# Patient Record
Sex: FEMALE | Race: WHITE | Employment: FULL TIME | URBAN - METROPOLITAN AREA
[De-identification: names, ages, dates, MRNs, and addresses within clinical notes are randomized per-mention and may not be internally consistent; named-entity substitution may affect disease eponyms.]

---

## 2017-12-07 ENCOUNTER — HOSPITAL ENCOUNTER (EMERGENCY)
Age: 52
Discharge: HOME | End: 2017-12-07
Attending: EMERGENCY MEDICINE
Payer: COMMERCIAL

## 2017-12-07 VITALS
DIASTOLIC BLOOD PRESSURE: 99 MMHG | OXYGEN SATURATION: 97 % | HEART RATE: 65 BPM | TEMPERATURE: 97.9 F | SYSTOLIC BLOOD PRESSURE: 149 MMHG | RESPIRATION RATE: 18 BRPM

## 2017-12-07 DIAGNOSIS — N35.010 POST-TRAUMATIC MALE URETHRAL MEATAL STRICTURE: ICD-10-CM

## 2017-12-07 DIAGNOSIS — R33.9 URINARY RETENTION: Primary | ICD-10-CM

## 2017-12-07 LAB
ANION GAP SERPL CALC-SCNC: 6 MMOL/L (ref 5–15)
APPEARANCE UR: CLEAR
BACTERIA URNS QL MICRO: NEGATIVE /HPF
BASOPHILS # BLD: 0 K/UL (ref 0–0.1)
BASOPHILS NFR BLD: 0 % (ref 0–1)
BILIRUB UR QL: NEGATIVE
BUN SERPL-MCNC: 12 MG/DL (ref 6–20)
BUN/CREAT SERPL: 16 (ref 12–20)
CALCIUM SERPL-MCNC: 8.7 MG/DL (ref 8.5–10.1)
CHLORIDE SERPL-SCNC: 105 MMOL/L (ref 97–108)
CO2 SERPL-SCNC: 27 MMOL/L (ref 21–32)
COLOR UR: ABNORMAL
CREAT SERPL-MCNC: 0.73 MG/DL (ref 0.55–1.02)
EOSINOPHIL # BLD: 0.1 K/UL (ref 0–0.4)
EOSINOPHIL NFR BLD: 1 % (ref 0–7)
EPITH CASTS URNS QL MICRO: ABNORMAL /LPF
ERYTHROCYTE [DISTWIDTH] IN BLOOD BY AUTOMATED COUNT: 12.4 % (ref 11.5–14.5)
GLUCOSE SERPL-MCNC: 199 MG/DL (ref 65–100)
GLUCOSE UR STRIP.AUTO-MCNC: 250 MG/DL
HCT VFR BLD AUTO: 38.2 % (ref 35–47)
HGB BLD-MCNC: 12.6 G/DL (ref 11.5–16)
HGB UR QL STRIP: ABNORMAL
HYALINE CASTS URNS QL MICRO: ABNORMAL /LPF (ref 0–5)
KETONES UR QL STRIP.AUTO: NEGATIVE MG/DL
LEUKOCYTE ESTERASE UR QL STRIP.AUTO: NEGATIVE
LYMPHOCYTES # BLD: 1.7 K/UL (ref 0.8–3.5)
LYMPHOCYTES NFR BLD: 24 % (ref 12–49)
MCH RBC QN AUTO: 30.7 PG (ref 26–34)
MCHC RBC AUTO-ENTMCNC: 33 G/DL (ref 30–36.5)
MCV RBC AUTO: 92.9 FL (ref 80–99)
MONOCYTES # BLD: 0.4 K/UL (ref 0–1)
MONOCYTES NFR BLD: 5 % (ref 5–13)
NEUTS SEG # BLD: 4.9 K/UL (ref 1.8–8)
NEUTS SEG NFR BLD: 70 % (ref 32–75)
NITRITE UR QL STRIP.AUTO: NEGATIVE
PH UR STRIP: 5.5 [PH] (ref 5–8)
PLATELET # BLD AUTO: 209 K/UL (ref 150–400)
POTASSIUM SERPL-SCNC: 4.3 MMOL/L (ref 3.5–5.1)
PROT UR STRIP-MCNC: NEGATIVE MG/DL
RBC # BLD AUTO: 4.11 M/UL (ref 3.8–5.2)
RBC #/AREA URNS HPF: ABNORMAL /HPF (ref 0–5)
SODIUM SERPL-SCNC: 138 MMOL/L (ref 136–145)
SP GR UR REFRACTOMETRY: 1.02 (ref 1–1.03)
UA: UC IF INDICATED,UAUC: ABNORMAL
UROBILINOGEN UR QL STRIP.AUTO: 0.2 EU/DL (ref 0.2–1)
WBC # BLD AUTO: 7 K/UL (ref 3.6–11)
WBC URNS QL MICRO: ABNORMAL /HPF (ref 0–4)

## 2017-12-07 PROCEDURE — 85025 COMPLETE CBC W/AUTO DIFF WBC: CPT | Performed by: EMERGENCY MEDICINE

## 2017-12-07 PROCEDURE — 99284 EMERGENCY DEPT VISIT MOD MDM: CPT

## 2017-12-07 PROCEDURE — 81001 URINALYSIS AUTO W/SCOPE: CPT | Performed by: EMERGENCY MEDICINE

## 2017-12-07 PROCEDURE — 80048 BASIC METABOLIC PNL TOTAL CA: CPT | Performed by: EMERGENCY MEDICINE

## 2017-12-07 PROCEDURE — 51703 INSERT BLADDER CATH COMPLEX: CPT

## 2017-12-07 PROCEDURE — 74011000250 HC RX REV CODE- 250: Performed by: EMERGENCY MEDICINE

## 2017-12-07 PROCEDURE — 77030005514 HC CATH URETH FOL14 BARD -A

## 2017-12-07 PROCEDURE — 36415 COLL VENOUS BLD VENIPUNCTURE: CPT | Performed by: EMERGENCY MEDICINE

## 2017-12-07 RX ORDER — LIDOCAINE HYDROCHLORIDE 20 MG/ML
JELLY TOPICAL
Status: COMPLETED | OUTPATIENT
Start: 2017-12-07 | End: 2017-12-07

## 2017-12-07 RX ORDER — CIPROFLOXACIN 250 MG/1
250 TABLET, FILM COATED ORAL EVERY 12 HOURS
Qty: 6 TAB | Refills: 0 | OUTPATIENT
Start: 2017-12-07 | End: 2017-12-10

## 2017-12-07 RX ADMIN — LIDOCAINE HYDROCHLORIDE: 20 JELLY TOPICAL at 14:39

## 2017-12-07 NOTE — ED NOTES
Attempted to straight cath patient and was unable to at this time. Dr. Dania Gomez was aware. Dr. Dania Gomez assisted the second time and was unsuccessful.

## 2017-12-07 NOTE — ED NOTES
Pt discharged by Dr Kristal Ace. Pt provided with discharge instructions Rx and instructions on follow up care. Pt out of ED under own power with steady gait accompanied by family.

## 2017-12-07 NOTE — ED PROVIDER NOTES
EMERGENCY DEPARTMENT HISTORY AND PHYSICAL EXAM      Date: 12/7/2017  Patient Name: Makayla Ruvalcaba    History of Presenting Illness     Chief Complaint   Patient presents with    Abdominal Pain    Urinary Retention     Last urinated at 0630 this morning. Pt states decreased over the past few days. History Provided By: Patient    HPI: Makayla Ruvalcaba, 46 y.o. female with PMHx significant for HTN and DM, presents ambulatory to the ED with cc of urinary retention, minimal dysuria, and associated lower abdominal pain x 2 days. She describes her abdominal pain as a pressure. She notes that she last urinated at 0630 this morning, but she had decreased urine output. She also notes a history of vesicoureteral reflux and recurrent UTIs. She reports that she had urological surgery in 2006. She specifically denies back pain, flank pain, weakness, saddle anesthesia, urinary/fecal incontinence, numbness, fevers, chills, chest pain, SOB, or other complaints at this time. There are no other complaints, changes, or physical findings at this time. PCP: Javier Ortiz MD   Urology: Madison Thomason M.D. Current Outpatient Prescriptions   Medication Sig Dispense Refill    ciprofloxacin HCl (CIPRO) 250 mg tablet Take 1 Tab by mouth every twelve (12) hours for 3 days. 6 Tab 0    propranolol (INDERAL) 40 mg tablet Take 40 mg by mouth two (2) times a day.  phentermine 37.5 mg capsule Take 37.5 mg by mouth every morning.  oxyCODONE-acetaminophen (PERCOCET) 5-325 mg per tablet Take 1 Tab by mouth every four (4) hours as needed for Pain. Max Daily Amount: 6 Tabs. 20 Tab 0       Past History     Past Medical History:  Past Medical History:   Diagnosis Date    Diabetes ([address]     Hypertension        Past Surgical History:  Past Surgical History:   Procedure Laterality Date    HX UROLOGICAL      kidney 2006       Family History:  History reviewed. No pertinent family history.     Social History:  Social History   Substance Use Topics    Smoking status: Former Smoker    Smokeless tobacco: Never Used    Alcohol use Yes       Allergies: Allergies   Allergen Reactions    Keflex [Cephalexin] Rash    Pcn [Penicillins] Swelling         Review of Systems   Review of Systems   Constitutional: Negative for chills and fever. HENT: Negative for congestion and sore throat. Eyes: Negative for visual disturbance. Respiratory: Negative for cough and shortness of breath. Cardiovascular: Negative for chest pain and leg swelling. Gastrointestinal: Positive for abdominal pain. Negative for blood in stool, diarrhea and nausea. Endocrine: Negative for polyuria. Genitourinary: Positive for decreased urine volume, difficulty urinating and dysuria. Negative for flank pain, vaginal bleeding and vaginal discharge. (-) Urinary/fecal incontinence   Musculoskeletal: Negative for back pain and myalgias. Skin: Negative for rash. Allergic/Immunologic: Negative for immunocompromised state. Neurological: Negative for weakness, numbness (Saddle anesthesia) and headaches. Psychiatric/Behavioral: Negative for confusion. Physical Exam   Physical Exam   Constitutional: She is oriented to person, place, and time. She appears well-developed and well-nourished. HENT:   Head: Normocephalic and atraumatic. Moist mucous membranes   Eyes: Conjunctivae are normal. Pupils are equal, round, and reactive to light. Right eye exhibits no discharge. Left eye exhibits no discharge. Neck: Normal range of motion. Neck supple. No tracheal deviation present. Cardiovascular: Normal rate, regular rhythm and normal heart sounds. No murmur heard. Pulmonary/Chest: Effort normal and breath sounds normal. No respiratory distress. She has no wheezes. She has no rales. Abdominal: Soft. Bowel sounds are normal. There is tenderness in the suprapubic area. There is CVA tenderness (Slight left).  There is no rebound and no guarding. Musculoskeletal: Normal range of motion. She exhibits no edema, tenderness or deformity. Neurological: She is alert and oriented to person, place, and time. Skin: Skin is warm and dry. No rash noted. No erythema. Psychiatric: Her behavior is normal.   Nursing note and vitals reviewed. Diagnostic Study Results     Labs -     Recent Results (from the past 12 hour(s))   METABOLIC PANEL, BASIC    Collection Time: 12/07/17  1:55 PM   Result Value Ref Range    Sodium 138 136 - 145 mmol/L    Potassium 4.3 3.5 - 5.1 mmol/L    Chloride 105 97 - 108 mmol/L    CO2 27 21 - 32 mmol/L    Anion gap 6 5 - 15 mmol/L    Glucose 199 (H) 65 - 100 mg/dL    BUN 12 6 - 20 MG/DL    Creatinine 0.73 0.55 - 1.02 MG/DL    BUN/Creatinine ratio 16 12 - 20      GFR est AA >60 >60 ml/min/1.73m2    GFR est non-AA >60 >60 ml/min/1.73m2    Calcium 8.7 8.5 - 10.1 MG/DL   CBC WITH AUTOMATED DIFF    Collection Time: 12/07/17  1:55 PM   Result Value Ref Range    WBC 7.0 3.6 - 11.0 K/uL    RBC 4.11 3.80 - 5.20 M/uL    HGB 12.6 11.5 - 16.0 g/dL    HCT 38.2 35.0 - 47.0 %    MCV 92.9 80.0 - 99.0 FL    MCH 30.7 26.0 - 34.0 PG    MCHC 33.0 30.0 - 36.5 g/dL    RDW 12.4 11.5 - 14.5 %    PLATELET 677 931 - 949 K/uL    NEUTROPHILS 70 32 - 75 %    LYMPHOCYTES 24 12 - 49 %    MONOCYTES 5 5 - 13 %    EOSINOPHILS 1 0 - 7 %    BASOPHILS 0 0 - 1 %    ABS. NEUTROPHILS 4.9 1.8 - 8.0 K/UL    ABS. LYMPHOCYTES 1.7 0.8 - 3.5 K/UL    ABS. MONOCYTES 0.4 0.0 - 1.0 K/UL    ABS. EOSINOPHILS 0.1 0.0 - 0.4 K/UL    ABS.  BASOPHILS 0.0 0.0 - 0.1 K/UL   URINALYSIS W/ REFLEX CULTURE    Collection Time: 12/07/17  4:18 PM   Result Value Ref Range    Color YELLOW/STRAW      Appearance CLEAR CLEAR      Specific gravity 1.017 1.003 - 1.030      pH (UA) 5.5 5.0 - 8.0      Protein NEGATIVE  NEG mg/dL    Glucose 250 (A) NEG mg/dL    Ketone NEGATIVE  NEG mg/dL    Bilirubin NEGATIVE  NEG      Blood MODERATE (A) NEG      Urobilinogen 0.2 0.2 - 1.0 EU/dL    Nitrites NEGATIVE NEG      Leukocyte Esterase NEGATIVE  NEG      WBC 0-4 0 - 4 /hpf    RBC 20-50 0 - 5 /hpf    Epithelial cells FEW FEW /lpf    Bacteria NEGATIVE  NEG /hpf    UA:UC IF INDICATED CULTURE NOT INDICATED BY UA RESULT CNI      Hyaline cast 0-2 0 - 5 /lpf       Medical Decision Making   I am the first provider for this patient. I reviewed the vital signs, available nursing notes, past medical history, past surgical history, family history and social history. Vital Signs-Reviewed the patient's vital signs. Patient Vitals for the past 12 hrs:   Temp Pulse Resp BP SpO2   12/07/17 1545 - - - (!) 149/99 97 %   12/07/17 1500 - - - (!) 138/96 97 %   12/07/17 1330 - - - 136/77 97 %   12/07/17 1318 97.9 °F (36.6 °C) 65 18 (!) 182/93 99 %       Records Reviewed: Nursing Notes, Old Medical Records and Previous Laboratory Studies    Provider Notes (Medical Decision Making):   DDx: UTI. STI is less likely. Urinary retention secondary to neurologic process is unlikely as she is has no back pain. Kidney/uretal stone is possible, but the patient is not have significant symptoms to support at this time. ED Course:   Initial assessment performed. The patients presenting problems have been discussed, and they are in agreement with the care plan formulated and outlined with them. I have encouraged them to ask questions as they arise throughout their visit. Procedure Note - Limited Bedside Ultrasound - Bladder   1:36 PM  Performed by: Candice Thomas DO  Indication: urinary retention  Interpretation: abnormal  Bladder volume was not measured, but ultrasound does show the bladder retaining urine. The procedure took 1-15 minutes, and pt tolerated well. Written by CARLINE Lamas, as dictated by Candice Thomas DO. Progress Note:  2:37 PM  The patient is unable to urinate at this time. Will straight cath for urine and give muscarinic agonist for urinary retention.   Written by CARLINE Lamas, as dictated by Irma Adams DO Gill. Consult Note:  2:29 PM  Brenden Carrasquillo DO spoke with Jose Washington MD,  Specialty: Urology  Discussed pt's hx, disposition, and available diagnostic and imaging results. Reviewed care plans. Consultant agrees with plans as outlined. Dr. Ava Vora reports that it is likely a cystocele among other things. He recommends rod catheter placement, possible a coude catheter. Dr. vAa Vora reviewed various techniques over the phone, and recommends to re-call if catheter placement is unsuccessful. Progress Note:  3:15 PM  Attempted to place rod catheter without success. Will re-page Urology. Written by Susana Lowery ED Scribe, as dictated by Brenden Carrasquillo DO. Progress Note:  4:06 PM  Urology called back and states they will place the rod catheter. Written by CARLINE Cuba, as dictated by Brenden Carrasquillo DO. Critical Care Time: 0 minutes    Discharge Note:  5:10 PM  The pt is ready for discharge. The pt's signs, symptoms, diagnosis, and discharge instructions have been discussed and pt has conveyed their understanding. The pt is to follow up as recommended or return to ER should their symptoms worsen. Plan has been discussed and pt is in agreement. PLAN:  1. Discharge Medication List as of 12/7/2017  4:45 PM      START taking these medications    Details   ciprofloxacin HCl (CIPRO) 250 mg tablet Take 1 Tab by mouth every twelve (12) hours for 3 days. , Print, Disp-6 Tab, R-0         CONTINUE these medications which have NOT CHANGED    Details   propranolol (INDERAL) 40 mg tablet Take 40 mg by mouth two (2) times a day., Historical Med      phentermine 37.5 mg capsule Take 37.5 mg by mouth every morning., Historical Med      oxyCODONE-acetaminophen (PERCOCET) 5-325 mg per tablet Take 1 Tab by mouth every four (4) hours as needed for Pain. Max Daily Amount: 6 Tabs., Print, Disp-20 Tab, R-0           2.    Follow-up Information     Follow up With [facility]  As directed [address] [facility]          Return to ED if worse     Diagnosis     Clinical Impression:   1. Urinary retention            Attestations: This note is prepared by Lc Espinoza, acting as a Scribe for Brenden Alonso DO. Brenden Alonso DO: The scribe's documentation has been prepared under my direction and personally reviewed by me in its entirety. I confirm that the notes above accurately reflects all work, treatment, procedures, and medical decision making performed by me. This note will not be viewable in [address]

## 2017-12-07 NOTE — ED NOTES
Assumed care of pt. Pt. Placed in position of comfort. Call bell within reach. Pt. Made aware of care plan. Pt came in with c/o not being able to urinate. Pt has hx of kidney stones.

## 2017-12-07 NOTE — DISCHARGE INSTRUCTIONS

## 2017-12-07 NOTE — CONSULTS
Requesting Provider: [employer], DO  Reason for Consultation: \"retention\"  Pre-existing [facility] Patient:   Carisa                Patient: Betsy Nielsen MRN: [id number]  SSN: [id number]    YOB: 1965  Age: 46 y.o. Sex: female     Location: [facility]/       Code Status: No Order   PCP: Ace Frederick, MD  - None   Emergency Contact:  Primary Emergency Contact: Luis AlfredoMalcolm, Home Phone: [phone]   Race/Mormonism/Language: [facility] /  / [facility] [place]: Payor: Saúl Dipesh / Plan: [facility] / Product Type: Commerical /    Prior Admission Data: 16 [facility] EMERGENCY DEPT     Hospitalized:  Hospital Day: 1 - Admitted 2017  1:15 PM   POD # * No surgery found *  by * Surgery not found * - Blood Loss: * No surgery found * * Surgery not found *     CONSULTANTS  None   ADMISSION DIAGNOSES  No diagnosis found. Assessment/Plan:     · Urethral stricture[place] 3 discharge. Empiric coverage a few days antibiotics. We will call to arrange formal cystoscopy and urethral dilation. Possible decompression hematuria noted. CC: Abdominal Pain and Urinary Retention (Last urinated at 0630 this morning. Pt states decreased over the past few days.)   HPI: She has past medical history urethral stenosis, kidney stones, vesicoureteral reflux. She has noticed increasing difficulty with urination culminating in inability to void. Bladder scan shows 800 cc. ER personnel have been unable to place catheter. Blood seen on the drapes upon arrival.    Sterile prep and drape accomplished 16 daily placed. There was considerable resistance within the urethra, I popped through probably indicating recurrent urethral stricture. Clear urine drained. The balloon was blown up.     Temp (24hrs), Av.9 °F (36.6 °C), Min:97.9 °F (36.6 °C), Max:97.9 °F (36.6 °C)    Creatinine   Date/Time Value Ref Range Status   2017 01:55 PM 0.73 0.55 - 1.02 MG/DL Final   2016 09:20 AM 0.83 0.55 - 1.02 MG/DL Final     Current Antimicrobial Therapy     None        Diet:   -          Labs     Lab Results   Component Value Date/Time    WBC 7.0 12/07/2017 01:55 PM    HCT 38.2 12/07/2017 01:55 PM    PLATELET 865 51/81/9834 01:55 PM    Sodium 138 12/07/2017 01:55 PM    Potassium 4.3 12/07/2017 01:55 PM    Chloride 105 12/07/2017 01:55 PM    CO2 27 12/07/2017 01:55 PM    BUN 12 12/07/2017 01:55 PM    Creatinine 0.73 12/07/2017 01:55 PM    Glucose 199 12/07/2017 01:55 PM    Calcium 8.7 12/07/2017 01:55 PM     UA: Lab Results   Component Value Date/Time    Color YELLOW/STRAW 08/23/2016 08:14 AM    Appearance TURBID 08/23/2016 08:14 AM    Specific gravity 1.013 08/23/2016 08:14 AM    pH (UA) 5.5 08/23/2016 08:14 AM    Protein TRACE 08/23/2016 08:14 AM    Glucose NEGATIVE  08/23/2016 08:14 AM    Ketone NEGATIVE  08/23/2016 08:14 AM    Bilirubin NEGATIVE  08/23/2016 08:14 AM    Urobilinogen 0.2 08/23/2016 08:14 AM    Nitrites NEGATIVE  08/23/2016 08:14 AM    Leukocyte Esterase LARGE 08/23/2016 08:14 AM    Epithelial cells FEW 08/23/2016 08:14 AM    Bacteria 1+ 08/23/2016 08:14 AM    WBC >100 08/23/2016 08:14 AM    RBC 5-10 08/23/2016 08:14 AM     Imaging     Results for orders placed during the hospital encounter of 08/23/16   CT ABD PELV WO CONT    Narrative EXAM:  CT ABD PELV WO CONT  Clinical history: Abdominal pain and kidney stone  INDICATION:  Abd Pain/KS    COMPARISON: None. CONTRAST:  None. TECHNIQUE:   Unenhanced multislice helical CT was performed from the diaphragm to the  symphysis pubis without oral or intravenous contrast administration. Contiguous  5 mm axial images were reconstructed and lung and soft tissue windows were  generated. Coronal and sagittal reformations were generated. CT dose reduction  was achieved through use of a standardized protocol tailored for this  examination and automatic exposure control for dose modulation.  Adaptive  statistical iterative reconstruction (ASIR) was utilized. FINDINGS:  There is moderate to severe hydroureteronephrosis on the left with multiple  small distal left ureteral calculi identified. Hyper density of the urothelium  suggests possible superimposed pyelonephritis. There are no right renal calculi. Hepatic steatosis. The absence of intravenous contrast material reduces the sensitivity for  evaluation of the solid parenchymal organs of the abdomen. The aorta tapers without aneurysm. There is no retroperitoneal adenopathy or  mass. The bowel is not obstructed. There is no pelvic mass or adenopathy. Degenerative change in the lumbar spine with severe canal and foraminal stenosis  at L5-S1. Impression IMPRESSION:    There is moderate to severe hydroureteronephrosis on the left with multiple  small distal left ureteral calculi identified. Up to 6 mm in size. Hyperdensity  of the urothelium suggests possible superimposed pyelonephritis. There are no  right renal calculi. Hepatic steatosis. Degenerative change in the lumbar spine with severe canal and foraminal stenoses  at L5-S1. US Results (most recent):  No results found for this or any previous visit. Cultures     All Micro Results     None           Past History: (Complete 2+/3 areas)     Allergies   Allergen Reactions    Keflex [Cephalexin] Rash    Pcn [Penicillins] Swelling      No current facility-administered medications for this encounter. Current Outpatient Prescriptions   Medication Sig    propranolol (INDERAL) 40 mg tablet Take 40 mg by mouth two (2) times a day.  phentermine 37.5 mg capsule Take 37.5 mg by mouth every morning.  oxyCODONE-acetaminophen (PERCOCET) 5-325 mg per tablet Take 1 Tab by mouth every four (4) hours as needed for Pain. Max Daily Amount: 6 Tabs. Prior to Admission medications    Medication Sig Start Date End Date Taking?  Authorizing Provider   propranolol (INDERAL) 40 mg tablet Take 40 mg by mouth two (2) times a day. Yes Ace Frederick MD   phentermine 37.5 mg capsule Take 37.5 mg by mouth every morning. Ace Frederick MD   oxyCODONE-acetaminophen (PERCOCET) 5-325 mg per tablet Take 1 Tab by mouth every four (4) hours as needed for Pain. Max Daily Amount: 6 Tabs. 8/23/16   NICO Thornton        PMHx:  has a past medical history of Diabetes ([address] and Hypertension. PSurgHx:  has a past surgical history that includes urological.  PSocHx:  reports that she has quit smoking. She has never used smokeless tobacco. She reports that she drinks alcohol. She reports that she does not use illicit drugs.    ROS:  (Complete - 10 systems) - positives are bolded : Weightloss (Constitutional), Dry mouth (ENMT), Chest pain (CV), SOB (Respiratory), Constipation (GI), Weakness (MS), Pallor (Skin), TIA Sx (Neuro), Confusion (Psych), Easy bruising (Heme)    Physical Exam: (Comprehesive - 8+ 1995 Systems)     (1) Constitutional:  FIO2:   on SpO2: O2 Sat (%): 97 %       Patient Vitals for the past 24 hrs:   BP Temp Pulse Resp SpO2   12/07/17 1545 (!) 149/99 - - - 97 %   12/07/17 1500 (!) 138/96 - - - 97 %   12/07/17 1330 136/77 - - - 97 %   12/07/17 1318 (!) 182/93 97.9 °F (36.6 °C) 65 18 99 %           (2) ENMT:   moist mucous membranes   (3) Respiratory:  breathing easily   (4) GI:  no abdominal masses, tenderness, no hepatosplenomegaly, no ventral hernia, stool for occult blood not indicated as urologist.   (5) :   normal   (6) Lymphatic:  no adenopathy   (7) Muscloskeletal:  no gross deformity   (8) Skin:  no rash   (9) Neuro:  no focal deficits      Signed By: Emiliana Montesinos MD  - December 7, 2017

## 2018-06-10 ENCOUNTER — HOSPITAL ENCOUNTER (EMERGENCY)
Age: 53
Discharge: HOME | End: 2018-06-10
Attending: EMERGENCY MEDICINE
Payer: COMMERCIAL

## 2018-06-10 ENCOUNTER — APPOINTMENT (OUTPATIENT)
Dept: CT IMAGING | Age: 53
End: 2018-06-10
Attending: EMERGENCY MEDICINE
Payer: COMMERCIAL

## 2018-06-10 VITALS
RESPIRATION RATE: 18 BRPM | HEIGHT: 67 IN | SYSTOLIC BLOOD PRESSURE: 128 MMHG | BODY MASS INDEX: 29.93 KG/M2 | HEART RATE: 62 BPM | WEIGHT: 190.7 LBS | OXYGEN SATURATION: 97 % | TEMPERATURE: 98 F | DIASTOLIC BLOOD PRESSURE: 70 MMHG

## 2018-06-10 DIAGNOSIS — N30.00 ACUTE CYSTITIS WITHOUT HEMATURIA: Primary | ICD-10-CM

## 2018-06-10 LAB
ALBUMIN SERPL-MCNC: 3.8 G/DL (ref 3.5–5)
ALBUMIN/GLOB SERPL: 0.9 {RATIO} (ref 1.1–2.2)
ALP SERPL-CCNC: 168 U/L (ref 45–117)
ALT SERPL-CCNC: 76 U/L (ref 12–78)
ANION GAP SERPL CALC-SCNC: 7 MMOL/L (ref 5–15)
APPEARANCE UR: CLEAR
AST SERPL-CCNC: 72 U/L (ref 15–37)
BACTERIA URNS QL MICRO: ABNORMAL /HPF
BASOPHILS # BLD: 0 K/UL (ref 0–0.1)
BASOPHILS NFR BLD: 0 % (ref 0–1)
BILIRUB SERPL-MCNC: 0.7 MG/DL (ref 0.2–1)
BILIRUB UR QL: NEGATIVE
BUN SERPL-MCNC: 13 MG/DL (ref 6–20)
BUN/CREAT SERPL: 16 (ref 12–20)
CALCIUM SERPL-MCNC: 9.5 MG/DL (ref 8.5–10.1)
CHLORIDE SERPL-SCNC: 103 MMOL/L (ref 97–108)
CO2 SERPL-SCNC: 28 MMOL/L (ref 21–32)
COLOR UR: ABNORMAL
CREAT SERPL-MCNC: 0.83 MG/DL (ref 0.55–1.02)
DIFFERENTIAL METHOD BLD: NORMAL
EOSINOPHIL # BLD: 0.1 K/UL (ref 0–0.4)
EOSINOPHIL NFR BLD: 2 % (ref 0–7)
EPITH CASTS URNS QL MICRO: ABNORMAL /LPF
ERYTHROCYTE [DISTWIDTH] IN BLOOD BY AUTOMATED COUNT: 12.4 % (ref 11.5–14.5)
GLOBULIN SER CALC-MCNC: 4.1 G/DL (ref 2–4)
GLUCOSE SERPL-MCNC: 281 MG/DL (ref 65–100)
GLUCOSE UR STRIP.AUTO-MCNC: >1000 MG/DL
HCT VFR BLD AUTO: 40.9 % (ref 35–47)
HGB BLD-MCNC: 13.4 G/DL (ref 11.5–16)
HGB UR QL STRIP: NEGATIVE
IMM GRANULOCYTES # BLD: 0 K/UL (ref 0–0.04)
IMM GRANULOCYTES NFR BLD AUTO: 0 % (ref 0–0.5)
KETONES UR QL STRIP.AUTO: NEGATIVE MG/DL
LEUKOCYTE ESTERASE UR QL STRIP.AUTO: NEGATIVE
LIPASE SERPL-CCNC: 135 U/L (ref 73–393)
LYMPHOCYTES # BLD: 1.8 K/UL (ref 0.8–3.5)
LYMPHOCYTES NFR BLD: 24 % (ref 12–49)
MCH RBC QN AUTO: 30.5 PG (ref 26–34)
MCHC RBC AUTO-ENTMCNC: 32.8 G/DL (ref 30–36.5)
MCV RBC AUTO: 93.2 FL (ref 80–99)
MONOCYTES # BLD: 0.4 K/UL (ref 0–1)
MONOCYTES NFR BLD: 6 % (ref 5–13)
NEUTS SEG # BLD: 5.1 K/UL (ref 1.8–8)
NEUTS SEG NFR BLD: 67 % (ref 32–75)
NITRITE UR QL STRIP.AUTO: POSITIVE
NRBC # BLD: 0 K/UL (ref 0–0.01)
NRBC BLD-RTO: 0 PER 100 WBC
PH UR STRIP: 5.5 [PH] (ref 5–8)
PLATELET # BLD AUTO: 242 K/UL (ref 150–400)
PMV BLD AUTO: 10.6 FL (ref 8.9–12.9)
POTASSIUM SERPL-SCNC: 4.3 MMOL/L (ref 3.5–5.1)
PROT SERPL-MCNC: 7.9 G/DL (ref 6.4–8.2)
PROT UR STRIP-MCNC: NEGATIVE MG/DL
RBC # BLD AUTO: 4.39 M/UL (ref 3.8–5.2)
RBC #/AREA URNS HPF: ABNORMAL /HPF (ref 0–5)
SODIUM SERPL-SCNC: 138 MMOL/L (ref 136–145)
SP GR UR REFRACTOMETRY: 1.02 (ref 1–1.03)
UA: UC IF INDICATED,UAUC: ABNORMAL
UROBILINOGEN UR QL STRIP.AUTO: 0.2 EU/DL (ref 0.2–1)
WBC # BLD AUTO: 7.5 K/UL (ref 3.6–11)
WBC URNS QL MICRO: ABNORMAL /HPF (ref 0–4)

## 2018-06-10 PROCEDURE — 99285 EMERGENCY DEPT VISIT HI MDM: CPT

## 2018-06-10 PROCEDURE — 96374 THER/PROPH/DIAG INJ IV PUSH: CPT

## 2018-06-10 PROCEDURE — 87086 URINE CULTURE/COLONY COUNT: CPT | Performed by: EMERGENCY MEDICINE

## 2018-06-10 PROCEDURE — 51798 US URINE CAPACITY MEASURE: CPT

## 2018-06-10 PROCEDURE — 81001 URINALYSIS AUTO W/SCOPE: CPT | Performed by: EMERGENCY MEDICINE

## 2018-06-10 PROCEDURE — 80053 COMPREHEN METABOLIC PANEL: CPT | Performed by: EMERGENCY MEDICINE

## 2018-06-10 PROCEDURE — 83690 ASSAY OF LIPASE: CPT | Performed by: EMERGENCY MEDICINE

## 2018-06-10 PROCEDURE — 85025 COMPLETE CBC W/AUTO DIFF WBC: CPT | Performed by: EMERGENCY MEDICINE

## 2018-06-10 PROCEDURE — 74011250637 HC RX REV CODE- 250/637: Performed by: EMERGENCY MEDICINE

## 2018-06-10 PROCEDURE — 74176 CT ABD & PELVIS W/O CONTRAST: CPT

## 2018-06-10 PROCEDURE — 74011250636 HC RX REV CODE- 250/636: Performed by: EMERGENCY MEDICINE

## 2018-06-10 RX ORDER — CIPROFLOXACIN 500 MG/1
500 TABLET ORAL 2 TIMES DAILY
Qty: 20 TAB | Refills: 0 | Status: SHIPPED | OUTPATIENT
Start: 2018-06-10 | End: 2018-06-20

## 2018-06-10 RX ORDER — ONDANSETRON 2 MG/ML
4 INJECTION INTRAMUSCULAR; INTRAVENOUS
Status: COMPLETED | OUTPATIENT
Start: 2018-06-10 | End: 2018-06-10

## 2018-06-10 RX ORDER — SODIUM CHLORIDE 0.9 % (FLUSH) 0.9 %
5-10 SYRINGE (ML) INJECTION AS NEEDED
Status: DISCONTINUED | OUTPATIENT
Start: 2018-06-10 | End: 2018-06-10 | Stop reason: HOSPADM

## 2018-06-10 RX ORDER — CIPROFLOXACIN 500 MG/1
500 TABLET ORAL
Status: COMPLETED | OUTPATIENT
Start: 2018-06-10 | End: 2018-06-10

## 2018-06-10 RX ORDER — SODIUM CHLORIDE 0.9 % (FLUSH) 0.9 %
5-10 SYRINGE (ML) INJECTION EVERY 8 HOURS
Status: DISCONTINUED | OUTPATIENT
Start: 2018-06-10 | End: 2018-06-10 | Stop reason: HOSPADM

## 2018-06-10 RX ORDER — MORPHINE SULFATE 10 MG/ML
2 INJECTION, SOLUTION INTRAMUSCULAR; INTRAVENOUS
Status: DISCONTINUED | OUTPATIENT
Start: 2018-06-10 | End: 2018-06-10 | Stop reason: HOSPADM

## 2018-06-10 RX ORDER — ACETAMINOPHEN 325 MG/1
650 TABLET ORAL
Status: COMPLETED | OUTPATIENT
Start: 2018-06-10 | End: 2018-06-10

## 2018-06-10 RX ADMIN — CIPROFLOXACIN 500 MG: 500 TABLET, FILM COATED ORAL at 17:46

## 2018-06-10 RX ADMIN — ONDANSETRON 4 MG: 2 INJECTION INTRAMUSCULAR; INTRAVENOUS at 15:21

## 2018-06-10 RX ADMIN — ACETAMINOPHEN 650 MG: 325 TABLET ORAL at 15:29

## 2018-06-10 NOTE — DISCHARGE INSTRUCTIONS

## 2018-06-10 NOTE — ED PROVIDER NOTES
EMERGENCY DEPARTMENT HISTORY AND PHYSICAL EXAM      Date: 6/10/2018  Patient Name: Jennifer Sommers    History of Presenting Illness     Chief Complaint   Patient presents with    Urinary Pain     x three days with left flank pain       History Provided By: Patient    HPI: Jennifer Sommers, 46 y.o. female with PMHx significant for HTN. DM, and kidney stones presents ambulatory to the ED with cc of gradual onset, constant, 6/10 flank pain and dysuria x 3 days with associated constant LLQ abd pain, difficulty urinating, diaphoresis and nausea. Pain is worsened when urinating and with palpation of the abd or flank. Pt states she has a history of difficulty urinating, and persistence of symptoms prompted the trip to the ED for evaluation. Her difficulty urinating began 3 days ago and is described as being able to go, but only small amounts at any one time. She denies smoking with occasional drinking. Pt specifically denies fever or chills. There are no other complaints, changes, or physical findings at this time. PCP: Ace Frederick MD    Current Facility-Administered Medications   Medication Dose Route Frequency Provider Last Rate Last Dose    sodium chloride (NS) flush 5-10 mL  5-10 mL IntraVENous Q8H Kaden Shay MD        sodium chloride (NS) flush 5-10 mL  5-10 mL IntraVENous PRN Genet Murillo MD        morphine injection 2 mg  2 mg IntraVENous NOW Genet Murillo MD         Current Outpatient Prescriptions   Medication Sig Dispense Refill    ciprofloxacin HCl (CIPRO) 500 mg tablet Take 1 Tab by mouth two (2) times a day for 10 days. 20 Tab 0    propranolol (INDERAL) 40 mg tablet Take 40 mg by mouth two (2) times a day.  phentermine 37.5 mg capsule Take 37.5 mg by mouth every morning.          Past History     Past Medical History:  Past Medical History:   Diagnosis Date    Diabetes ([facility] [address]     Hypertension        Past Surgical History:  Past Surgical History:   Procedure Laterality Date    HX UROLOGICAL      kidney 2006       Family History:  History reviewed. No pertinent family history. Social History:  Social History   Substance Use Topics    Smoking status: Former Smoker    Smokeless tobacco: Never Used    Alcohol use Yes       Allergies: Allergies   Allergen Reactions    Keflex [Cephalexin] Rash    Pcn [Penicillins] Swelling         Review of Systems   Review of Systems   Constitutional: Positive for diaphoresis. Negative for chills and fever. HENT: Negative. Negative for congestion and rhinorrhea. Respiratory: Negative. Negative for cough, chest tightness and wheezing. Cardiovascular: Negative. Negative for chest pain and palpitations. Gastrointestinal: Positive for abdominal pain (LLQ) and nausea. Negative for constipation and vomiting. Endocrine: Negative. Genitourinary: Positive for difficulty urinating, dysuria and flank pain (left). Negative for decreased urine volume, hematuria and pelvic pain. Musculoskeletal: Negative for back pain and neck pain. Skin: Negative. Negative for color change, pallor and rash. Neurological: Negative. Negative for dizziness, seizures, weakness, numbness and headaches. Hematological: Negative. Negative for adenopathy. Psychiatric/Behavioral: Negative. All other systems reviewed and are negative. Physical Exam   Physical Exam   Constitutional: She is oriented to person, place, and time. She appears well-developed and well-nourished. No distress. HENT:   Head: Normocephalic and atraumatic. Mouth/Throat: No oropharyngeal exudate. Eyes: Conjunctivae are normal. Pupils are equal, round, and reactive to light. Right eye exhibits no discharge. Left eye exhibits no discharge. No scleral icterus. Neck: Normal range of motion. Neck supple. No JVD present. Cardiovascular: Normal rate, regular rhythm, normal heart sounds and intact distal pulses. Exam reveals no gallop and no friction rub.     No murmur heard. Pulmonary/Chest: Effort normal and breath sounds normal. No stridor. No respiratory distress. She has no wheezes. She has no rales. She exhibits no tenderness. Abdominal: Soft. Bowel sounds are normal. She exhibits no distension and no mass. There is no tenderness. There is no rebound and no guarding. Neurological: She is alert and oriented to person, place, and time. She displays normal reflexes. No cranial nerve deficit. She exhibits normal muscle tone. Coordination normal.   Skin: Skin is warm. No rash noted. She is not diaphoretic. No pallor. Vitals reviewed. Diagnostic Study Results     Labs -     Recent Results (from the past 12 hour(s))   URINALYSIS W/ REFLEX CULTURE    Collection Time: 06/10/18  1:39 PM   Result Value Ref Range    Color DARK YELLOW      Appearance CLEAR CLEAR      Specific gravity 1.025 1.003 - 1.030      pH (UA) 5.5 5.0 - 8.0      Protein NEGATIVE  NEG mg/dL    Glucose >1000 (A) NEG mg/dL    Ketone NEGATIVE  NEG mg/dL    Bilirubin NEGATIVE  NEG      Blood NEGATIVE  NEG      Urobilinogen 0.2 0.2 - 1.0 EU/dL    Nitrites POSITIVE (A) NEG      Leukocyte Esterase NEGATIVE  NEG      WBC 5-10 0 - 4 /hpf    RBC 0-5 0 - 5 /hpf    Epithelial cells MODERATE (A) FEW /lpf    Bacteria 1+ (A) NEG /hpf    UA:UC IF INDICATED URINE CULTURE ORDERED (A) CNI     CBC WITH AUTOMATED DIFF    Collection Time: 06/10/18  2:15 PM   Result Value Ref Range    WBC 7.5 3.6 - 11.0 K/uL    RBC 4.39 3.80 - 5.20 M/uL    HGB 13.4 11.5 - 16.0 g/dL    HCT 40.9 35.0 - 47.0 %    MCV 93.2 80.0 - 99.0 FL    MCH 30.5 26.0 - 34.0 PG    MCHC 32.8 30.0 - 36.5 g/dL    RDW 12.4 11.5 - 14.5 %    PLATELET 289 724 - 313 K/uL    MPV 10.6 8.9 - 12.9 FL    NRBC 0.0 0  WBC    ABSOLUTE NRBC 0.00 0.00 - 0.01 K/uL    NEUTROPHILS 67 32 - 75 %    LYMPHOCYTES 24 12 - 49 %    MONOCYTES 6 5 - 13 %    EOSINOPHILS 2 0 - 7 %    BASOPHILS 0 0 - 1 %    IMMATURE GRANULOCYTES 0 0.0 - 0.5 %    ABS.  NEUTROPHILS 5.1 1.8 - 8.0 K/UL    ABS. LYMPHOCYTES 1.8 0.8 - 3.5 K/UL    ABS. MONOCYTES 0.4 0.0 - 1.0 K/UL    ABS. EOSINOPHILS 0.1 0.0 - 0.4 K/UL    ABS. BASOPHILS 0.0 0.0 - 0.1 K/UL    ABS. IMM. GRANS. 0.0 0.00 - 0.04 K/UL    DF AUTOMATED     LIPASE    Collection Time: 06/10/18  2:15 PM   Result Value Ref Range    Lipase 135 73 - 297 U/L   METABOLIC PANEL, COMPREHENSIVE    Collection Time: 06/10/18  2:15 PM   Result Value Ref Range    Sodium 138 136 - 145 mmol/L    Potassium 4.3 3.5 - 5.1 mmol/L    Chloride 103 97 - 108 mmol/L    CO2 28 21 - 32 mmol/L    Anion gap 7 5 - 15 mmol/L    Glucose 281 (H) 65 - 100 mg/dL    BUN 13 6 - 20 MG/DL    Creatinine 0.83 0.55 - 1.02 MG/DL    BUN/Creatinine ratio 16 12 - 20      GFR est AA >60 >60 ml/min/1.73m2    GFR est non-AA >60 >60 ml/min/1.73m2    Calcium 9.5 8.5 - 10.1 MG/DL    Bilirubin, total 0.7 0.2 - 1.0 MG/DL    ALT (SGPT) 76 12 - 78 U/L    AST (SGOT) 72 (H) 15 - 37 U/L    Alk. phosphatase 168 (H) 45 - 117 U/L    Protein, total 7.9 6.4 - 8.2 g/dL    Albumin 3.8 3.5 - 5.0 g/dL    Globulin 4.1 (H) 2.0 - 4.0 g/dL    A-G Ratio 0.9 (L) 1.1 - 2.2         Radiologic Studies -   CT Results  (Last 48 hours)               06/10/18 1536  CT ABD PELV WO CONT Final result    Impression:  IMPRESSION:    1. There is no  calculus. There is stable moderate dilatation of the left   renal collecting system and ureter with a probable ectopic left ureteral   insertion. Collecting system dilatation may represent partial obstruction and/or   reflux. 2. Hepatic steatosis. Narrative:  EXAM:  CT ABD PELV WO CONT       INDICATION: Left flank pain. COMPARISON: CT 8/23/2016. TECHNIQUE: Helical CT of the abdomen  and pelvis  without contrast. Coronal and   sagittal reformats are performed. CT dose reduction was achieved through use of   a standardized protocol tailored for this examination and automatic exposure   control for dose modulation.        FINDINGS:    Solid organ evaluation is limited without contrast.        The visualized lung bases demonstrate no mass or consolidation. The heart size   is normal. There is no pericardial or pleural effusion. There is stable moderate dilatation of the left renal collecting system and   ureter with a probable ectopic left ureteral insertion. Pelvic phleboliths are   again demonstrated without renal, ureteral, or bladder calculus. There is no   right hydronephrosis. There is no perinephric fluid or stranding. The liver is diffusely low in attenuation. The spleen, pancreas, and adrenal   glands are normal.  The gall bladder is present  without intra- or extra-hepatic   biliary dilatation. There are no dilated bowel loops. The appendix is normal.         There are no enlarged lymph nodes. There is no free fluid or free air. The   aorta tapers without aneurysm. The urinary bladder is unremarkable. There is no pelvic mass. A left acetabular bone island is again noted. There is degenerative change at   L5-S1. There is no aggressive bony lesion. Medical Decision Making   I am the first provider for this patient. I reviewed the vital signs, available nursing notes, past medical history, past surgical history, family history and social history. Vital Signs-Reviewed the patient's vital signs.   Patient Vitals for the past 12 hrs:   Temp Pulse Resp BP SpO2   06/10/18 1730 - (P) 64 - 128/70 97 %   06/10/18 1630 - (P) 62 - 121/77 96 %   06/10/18 1600 - (P) 65 - 133/73 96 %   06/10/18 1500 - 62 - 121/70 96 %   06/10/18 1430 - 62 - 129/73 97 %   06/10/18 1415 - 60 - 149/81 97 %   06/10/18 1324 98 °F (36.7 °C) (!) 57 18 (!) 168/106 98 %       Pulse Oximetry Analysis - 98% on RA    Cardiac Monitor:   Rate: 57 bpm  Rhythm: Sinus Bradycardia     Records Reviewed: Nursing Notes and Old Medical Records    Provider Notes (Medical Decision Making):   DDx: renal colic, urethral stenosis, UTI, renal failure    Impression Plan: Hx of urethral stenosis to the ER with decreased urine output and middle left flank pain. Plan will be to obtain blood work UA, and CT. Will discuss with urology. Final disposition pending that discussion. ED Course:   Initial assessment performed. The patients presenting problems have been discussed, and they are in agreement with the care plan formulated and outlined with them. I have encouraged them to ask questions as they arise throughout their visit. Consult Note:  5:37 PM  Maira Tubbs MD  spoke with Dr. Constantin Carmen,  Specialty: Urology  Discussed pt's hx, disposition, and available diagnostic and imaging results. Reviewed care plans. Consultant agrees with plans as outlined. Agrees with Abx. No catheter. Close follow-up with Dr. Latanya Helms tomorrow. Critical Care Time:   0    Disposition:  DISCHARGE NOTE:  5:35 PM  The patient is ready for discharge. The patients signs, symptoms, diagnosis, and instructions for discharge have been discussed and the pt has conveyed their understanding. The patient is to follow up as recommended or return to the ER should their symptoms worsen. Plan has been discussed and patient has conveyed their agreement. PLAN:  1. Discharge to home  Discharge Medication List as of 6/10/2018  5:41 PM      START taking these medications    Details   ciprofloxacin HCl (CIPRO) 500 mg tablet Take 1 Tab by mouth two (2) times a day for 10 days. , Normal, Disp-20 Tab, R-0         CONTINUE these medications which have NOT CHANGED    Details   propranolol (INDERAL) 40 mg tablet Take 40 mg by mouth two (2) times a day., Historical Med      phentermine 37.5 mg capsule Take 37.5 mg by mouth every morning., Historical Med           2.    Follow-up Information     Follow up With Details Comments 27Fito Aleman MD Schedule an appointment as soon as possible for a visit in 1 day  [address] [place] [address] 1 29 [facility]  [phone]      [facility] EMERGENCY DEPT  If symptoms worsen [address] [facility] [address] [address]  [phone]        Return to ED if worse     Diagnosis     Clinical Impression:   1. Acute cystitis without hematuria        Attestations: This note is prepared by Sophia Simms, acting as Scribe for Eli Maher MD.    Eli Maher MD: The scribe's documentation has been prepared under my direction and personally reviewed by me in its entirety. I confirm that the note above accurately reflects all work, treatment, procedures, and medical decision making performed by me.

## 2018-06-10 NOTE — ED NOTES
Assumed care from triage. Patient comes to the ED complaining of left flank pain that started three day ago. Patient states that the pain wraps around to the front part of her abdomen as well. Patient states she has problem urinating and has some nausea along with this as well. Patient did take Tamulosin this morning to help, but it did not work. Patient ambulatory to the stretcher.

## 2018-06-12 LAB
BACTERIA SPEC CULT: NORMAL
CC UR VC: NORMAL
SERVICE CMNT-IMP: NORMAL

## 2018-10-08 ENCOUNTER — HOSPITAL ENCOUNTER (OUTPATIENT)
Dept: CT IMAGING | Age: 53
Discharge: HOME | End: 2018-10-08
Attending: UROLOGY
Payer: COMMERCIAL

## 2018-10-08 DIAGNOSIS — N39.0 UTI (URINARY TRACT INFECTION): ICD-10-CM

## 2018-10-08 PROCEDURE — 74176 CT ABD & PELVIS W/O CONTRAST: CPT

## 2018-10-16 ENCOUNTER — HOSPITAL ENCOUNTER (OUTPATIENT)
Dept: NUCLEAR MEDICINE | Age: 53
Discharge: HOME | End: 2018-10-16
Attending: UROLOGY
Payer: COMMERCIAL

## 2018-10-16 DIAGNOSIS — N13.30 HYDRONEPHROSIS: ICD-10-CM

## 2018-10-16 PROCEDURE — 78708 K FLOW/FUNCT IMAGE W/DRUG: CPT

## 2018-10-16 PROCEDURE — 74011250636 HC RX REV CODE- 250/636: Performed by: UROLOGY

## 2018-10-16 PROCEDURE — 74011250636 HC RX REV CODE- 250/636

## 2018-10-16 RX ORDER — FUROSEMIDE 10 MG/ML
INJECTION INTRAMUSCULAR; INTRAVENOUS
Status: COMPLETED
Start: 2018-10-16 | End: 2018-10-16

## 2018-10-16 RX ORDER — FUROSEMIDE 10 MG/ML
40 INJECTION INTRAMUSCULAR; INTRAVENOUS ONCE
Status: COMPLETED | OUTPATIENT
Start: 2018-10-16 | End: 2018-10-16

## 2018-10-16 RX ADMIN — FUROSEMIDE 20 MG: 10 INJECTION, SOLUTION INTRAMUSCULAR; INTRAVENOUS at 13:42

## 2018-10-18 ENCOUNTER — HOSPITAL ENCOUNTER (INPATIENT)
Age: 53
LOS: 4 days | Discharge: HOME | DRG: 690 | End: 2018-10-22
Attending: UROLOGY | Admitting: UROLOGY
Payer: COMMERCIAL

## 2018-10-18 ENCOUNTER — APPOINTMENT (OUTPATIENT)
Dept: GENERAL RADIOLOGY | Age: 53
DRG: 690 | End: 2018-10-18
Attending: UROLOGY
Payer: COMMERCIAL

## 2018-10-18 DIAGNOSIS — N13.30 HYDRONEPHROSIS, UNSPECIFIED HYDRONEPHROSIS TYPE: ICD-10-CM

## 2018-10-18 DIAGNOSIS — A41.9 SEPSIS DUE TO URINARY TRACT INFECTION (HCC): Primary | ICD-10-CM

## 2018-10-18 DIAGNOSIS — N39.0 SEPSIS DUE TO URINARY TRACT INFECTION (HCC): Primary | ICD-10-CM

## 2018-10-18 LAB
GLUCOSE BLD STRIP.AUTO-MCNC: 187 MG/DL (ref 65–100)
LACTATE SERPL-SCNC: 1.9 MMOL/L (ref 0.4–2)
SERVICE CMNT-IMP: ABNORMAL

## 2018-10-18 PROCEDURE — 74011250636 HC RX REV CODE- 250/636: Performed by: UROLOGY

## 2018-10-18 PROCEDURE — 87086 URINE CULTURE/COLONY COUNT: CPT | Performed by: UROLOGY

## 2018-10-18 PROCEDURE — 74011250636 HC RX REV CODE- 250/636: Performed by: INTERNAL MEDICINE

## 2018-10-18 PROCEDURE — 65270000029 HC RM PRIVATE

## 2018-10-18 PROCEDURE — 82962 GLUCOSE BLOOD TEST: CPT

## 2018-10-18 PROCEDURE — 74011250637 HC RX REV CODE- 250/637: Performed by: UROLOGY

## 2018-10-18 PROCEDURE — 0T9130Z DRAINAGE OF LEFT KIDNEY WITH DRAINAGE DEVICE, PERCUTANEOUS APPROACH: ICD-10-PCS | Performed by: UROLOGY

## 2018-10-18 PROCEDURE — 83605 ASSAY OF LACTIC ACID: CPT | Performed by: UROLOGY

## 2018-10-18 PROCEDURE — 36415 COLL VENOUS BLD VENIPUNCTURE: CPT | Performed by: UROLOGY

## 2018-10-18 PROCEDURE — 71045 X-RAY EXAM CHEST 1 VIEW: CPT

## 2018-10-18 PROCEDURE — 87040 BLOOD CULTURE FOR BACTERIA: CPT | Performed by: UROLOGY

## 2018-10-18 RX ORDER — DEXTROSE, SODIUM CHLORIDE, AND POTASSIUM CHLORIDE 5; .45; .15 G/100ML; G/100ML; G/100ML
100 INJECTION INTRAVENOUS CONTINUOUS
Status: DISCONTINUED | OUTPATIENT
Start: 2018-10-18 | End: 2018-10-19

## 2018-10-18 RX ORDER — CLONIDINE HYDROCHLORIDE 0.1 MG/1
0.2 TABLET ORAL AS NEEDED
Status: DISCONTINUED | OUTPATIENT
Start: 2018-10-18 | End: 2018-10-22 | Stop reason: HOSPADM

## 2018-10-18 RX ORDER — ONDANSETRON 2 MG/ML
4 INJECTION INTRAMUSCULAR; INTRAVENOUS
Status: DISCONTINUED | OUTPATIENT
Start: 2018-10-18 | End: 2018-10-22 | Stop reason: HOSPADM

## 2018-10-18 RX ORDER — DOCUSATE SODIUM 100 MG/1
100 CAPSULE, LIQUID FILLED ORAL 2 TIMES DAILY
Status: DISCONTINUED | OUTPATIENT
Start: 2018-10-18 | End: 2018-10-22 | Stop reason: HOSPADM

## 2018-10-18 RX ORDER — LEVOFLOXACIN 500 MG/1
500 TABLET, FILM COATED ORAL
Status: DISCONTINUED | OUTPATIENT
Start: 2018-10-19 | End: 2018-10-18

## 2018-10-18 RX ORDER — LEVOFLOXACIN 5 MG/ML
750 INJECTION, SOLUTION INTRAVENOUS EVERY 24 HOURS
Status: DISCONTINUED | OUTPATIENT
Start: 2018-10-18 | End: 2018-10-19

## 2018-10-18 RX ORDER — OXYCODONE AND ACETAMINOPHEN 7.5; 325 MG/1; MG/1
1 TABLET ORAL
Status: DISCONTINUED | OUTPATIENT
Start: 2018-10-18 | End: 2018-10-22 | Stop reason: HOSPADM

## 2018-10-18 RX ORDER — OXYBUTYNIN CHLORIDE 5 MG/1
5 TABLET ORAL
Status: DISCONTINUED | OUTPATIENT
Start: 2018-10-18 | End: 2018-10-22 | Stop reason: HOSPADM

## 2018-10-18 RX ORDER — ZOLPIDEM TARTRATE 5 MG/1
5 TABLET ORAL
Status: DISCONTINUED | OUTPATIENT
Start: 2018-10-18 | End: 2018-10-22 | Stop reason: HOSPADM

## 2018-10-18 RX ORDER — DIPHENHYDRAMINE HYDROCHLORIDE 50 MG/ML
50 INJECTION, SOLUTION INTRAMUSCULAR; INTRAVENOUS
Status: COMPLETED | OUTPATIENT
Start: 2018-10-18 | End: 2018-10-19

## 2018-10-18 RX ORDER — FLUCONAZOLE 2 MG/ML
200 INJECTION, SOLUTION INTRAVENOUS EVERY 24 HOURS
Status: DISCONTINUED | OUTPATIENT
Start: 2018-10-18 | End: 2018-10-22

## 2018-10-18 RX ADMIN — OXYBUTYNIN CHLORIDE 5 MG: 5 TABLET ORAL at 22:47

## 2018-10-18 RX ADMIN — DOCUSATE SODIUM 100 MG: 100 CAPSULE, LIQUID FILLED ORAL at 13:45

## 2018-10-18 RX ADMIN — ZOLPIDEM TARTRATE 5 MG: 5 TABLET ORAL at 23:33

## 2018-10-18 RX ADMIN — FLUCONAZOLE 200 MG: 2 INJECTION, SOLUTION INTRAVENOUS at 13:45

## 2018-10-18 RX ADMIN — LEVOFLOXACIN 750 MG: 5 INJECTION, SOLUTION INTRAVENOUS at 20:16

## 2018-10-18 RX ADMIN — DEXTROSE MONOHYDRATE, SODIUM CHLORIDE, AND POTASSIUM CHLORIDE 100 ML/HR: 50; 4.5; 1.49 INJECTION, SOLUTION INTRAVENOUS at 13:42

## 2018-10-18 RX ADMIN — OXYBUTYNIN CHLORIDE 5 MG: 5 TABLET ORAL at 13:45

## 2018-10-18 NOTE — H&P
Requesting Provider: Molina Torres MD 
Reason for Consultation: \"infection\" Pre-existing [facility] Patient:   No 
 
         
Patient: Kendall Connelly MRN: [id number]  SSN: [id number] YOB: 1965  Age: [address] y.o. Sex: female Location: 2119/01 Code Status: Full Code PCP: Other, MD Ace  - None Emergency Contact:  Primary Emergency Contact: Lorie Trent, Home Phone: [phone] Race/Zoroastrianism/Language: WHITE OR  / [employer] / Speaks ENGLISH Payor: Payor: [employer]ov[employer] / Plan: [employer] / Product Type: PPO /   
Prior Admission Data: 6/10/18 \[facility]\"" EMERGENCY DEPT Hospitalized:  Hospital Day: 1 - Admitted 10/18/2018 12:10 PM  
POD # * No surgery found *  by * Surgery not found * - Blood Loss: * No surgery found * * Surgery not found * CONSULTANTS 
IP CONSULT TO INFECTIOUS DISEASES 
 ADMISSION DIAGNOSES No diagnosis found. Assessment/Plan: 
  
· Fungal UTI - r/o fungal sepsis - IV Diflucan, ID Consult, Blood/urine Cx, Also cover Levaquin for now · Left hydro - chronic, acutely worsened for scar vs stone vs fungal ball. Stent is ditsally migrated some, and unsure how effectively it is draining her. Will ask for left PCN 
· Urethral Stricture = s/p dilatation CC: No chief complaint on file. HPI: She is a [address] y.o. female failing outpatient treatment fungal UTI (no sensitivities are available. Has h/o left reimplant for VURD, chronic left hydro (noobstructive by remote renal scan), recent troubles with bacterial UTIs, Urethral stricture. Recent CT showed worsened hydro on left. . Stent placed and purulent material was drained and cultured eventually returning yeast.  Isaw her last week where KUB shows nonretention of the proximal coil of the stent, and distal migration. I place dher on PO Diflucan as outpatient and she saw me in office today, puny, mild hypotension, no fevers or flank pain. Urine still grossly infected.  Renal scan shows good function on left at 42 %, T1/2 24 minutes = intermediate for obstruction. Temp (24hrs), Av.2 °F (36.8 °C), Min:98.2 °F (36.8 °C), Max:98.2 °F (36.8 °C) Creatinine Date/Time Value Ref Range Status 06/10/2018 02:15 PM 0.83 0.55 - 1.02 MG/DL Final  
2017 01:55 PM 0.73 0.55 - 1.02 MG/DL Final  
2016 09:20 AM 0.83 0.55 - 1.02 MG/DL Final  
 
Current Antimicrobial Therapy (168h ago, onward) Ordered     Start Stop 10/18/18 1241  levoFLOXacin (LEVAQUIN) tablet 500 mg  500 mg,   Oral,   DAILY BEFORE BREAKFAST    
 10/19/18 0730 --  
 10/18/18 1241  fluconazole (DIFLUCAN) 200mg/100 mL IVPB (premix)  200 mg,   IntraVENous,   EVERY 24 HOURS    
 10/18/18 1300 --  
  
 
Diet: DIET REGULAR -   
   
 
Labs Lab Results Component Value Date/Time WBC 7.5 06/10/2018 02:15 PM  
 HCT 40.9 06/10/2018 02:15 PM  
 PLATELET 683  02:15 PM  
 Sodium 138 06/10/2018 02:15 PM  
 Potassium 4.3 06/10/2018 02:15 PM  
 Chloride 103 06/10/2018 02:15 PM  
 CO2 28 06/10/2018 02:15 PM  
 BUN 13 06/10/2018 02:15 PM  
 Creatinine 0.83 06/10/2018 02:15 PM  
 Glucose 281 (H) 06/10/2018 02:15 PM  
 Calcium 9.5 06/10/2018 02:15 PM  
 
UA:  
Lab Results Component Value Date/Time Color DARK YELLOW 06/10/2018 01:39 PM  
 Appearance CLEAR 06/10/2018 01:39 PM  
 Specific gravity 1.025 06/10/2018 01:39 PM  
 pH (UA) 5.5 06/10/2018 01:39 PM  
 Protein NEGATIVE  06/10/2018 01:39 PM  
 Glucose >1,000 (A) 06/10/2018 01:39 PM  
 Ketone NEGATIVE  06/10/2018 01:39 PM  
 Bilirubin NEGATIVE  06/10/2018 01:39 PM  
 Urobilinogen 0.2 06/10/2018 01:39 PM  
 Nitrites POSITIVE (A) 06/10/2018 01:39 PM  
 Leukocyte Esterase NEGATIVE  06/10/2018 01:39 PM  
 Epithelial cells MODERATE (A) 06/10/2018 01:39 PM  
 Bacteria 1+ (A) 06/10/2018 01:39 PM  
 WBC 5-10 06/10/2018 01:39 PM  
 RBC 0-5 06/10/2018 01:39 PM  
 
Imaging Results for orders placed during the hospital encounter of 10/08/18 CT ABD PELV WO CONT Narrative EXAM:  CT ABD PELV WO CONT Clinical history: UTI INDICATION: uti COMPARISON: 6/10/2018 CONTRAST:  None. TECHNIQUE:  
Thin axial images were obtained through the abdomen and pelvis. Coronal and 
sagittal reconstructions were generated. Oral contrast was not administered. CT 
dose reduction was achieved through use of a standardized protocol tailored for 
this examination and automatic exposure control for dose modulation. The absence of intravenous contrast material reduces the sensitivity for 
evaluation of the solid parenchymal organs of the abdomen. FINDINGS:  
LUNG BASES: Clear. INCIDENTALLY IMAGED HEART AND MEDIASTINUM: Unremarkable. LIVER: No mass or biliary dilatation. GALLBLADDER: Unremarkable. SPLEEN: No mass. PANCREAS: No mass or ductal dilatation. ADRENALS: Unremarkable. KIDNEYS/URETERS: There is moderate to severe hydroureteronephrosis on the left. Ureteral wall thickening. Bladder wall thickening as well. Distal ureteral 
calculus. There are separate ureteral insertions. Theadore Adrian STOMACH: Unremarkable. SMALL BOWEL: No dilatation or wall thickening. COLON: No dilatation or wall thickening. APPENDIX: Unremarkable. PERITONEUM:  
 
Retroperitoneal lymph nodes. No enlarged mesenteric lymph nodes. Theadore Adrian RETROPERITONEUM: No lymphadenopathy or aortic aneurysm. REPRODUCTIVE ORGANS: 
URINARY BLADDER: Bladder wall thickening with associated inflammatory 
stranding. Theadore Adrian BONES: Canal stenosis at L4-5 and L4-5 S1. Foraminal stenoses at L5-S1 as well. ADDITIONAL COMMENTS: N/A Impression IMPRESSION: 
Moderate to severe increased left hydroureteronephrosis with ectopic insertion. Small left ureteral calculus in the anteriorly inserting dilated left ureter. Bladder wall thickening which is partially due to nondistention, there is 
however inflammatory stranding adjacent to the bladder suggesting presence of 
cystitis. Pelvic phleboliths. Stented degenerative change in lumbar spine with L4-5, L5-S1 canal and foraminal 
stenoses present. US Results (most recent): No results found for this or any previous visit. Cultures All Micro Results Procedure Component Value Units Date/Time CULTURE, BLOOD [[id number]] Order Status:  Sent Specimen:  Whole Blood CULTURE, URINE [[id number]] Order Status:  Sent Specimen:  Urine from Clean catch Past History: (Complete 2+/3 areas) Allergies Allergen Reactions  Keflex [Cephalexin] Rash  Pcn [Penicillins] Swelling Current Facility-Administered Medications Medication Dose Route Frequency  dextrose 5% - 0.45% NaCl with KCl 20 mEq/L infusion  100 mL/hr IntraVENous CONTINUOUS  
 ondansetron (ZOFRAN) injection 4 mg  4 mg IntraVENous Q4H PRN  
 docusate sodium (COLACE) capsule 100 mg  100 mg Oral BID  
 oxybutynin (DITROPAN) tablet 5 mg  5 mg Oral TID PRN  
 oxyCODONE-acetaminophen (PERCOCET 7.5) 7.5-325 mg per tablet 1 Tab  1 Tab Oral Q4H PRN  
 zolpidem (AMBIEN) tablet 5 mg  5 mg Oral QHS PRN  
 cloNIDine HCl (CATAPRES) tablet 0.2 mg  0.2 mg Oral PRN  
 diphenhydrAMINE (BENADRYL) 50 mg in dextrose 5% 50 mL IVPB  50 mg IntraVENous ONCE PRN  
 fluconazole (DIFLUCAN) 200mg/100 mL IVPB (premix)  200 mg IntraVENous Q24H  
 [START ON 10/19/2018] levoFLOXacin (LEVAQUIN) tablet 500 mg  500 mg Oral ACB Prior to Admission medications Medication Sig Start Date End Date Taking? Authorizing Provider  
propranolol (INDERAL) 40 mg tablet Take 40 mg by mouth two (2) times a day. Ace Frederick MD  
phentermine 37.5 mg capsule Take 37.5 mg by mouth every morning. Ace Frederick MD  
  
 
PMHx:  has a past medical history of Diabetes ([address] and Hypertension. PSurgHx:  has a past surgical history that includes hx urological. 
PSocHx:  reports that she has quit smoking.  she has never used smokeless tobacco. She reports that she drinks alcohol. She reports that she does not use drugs. ROS:  (Complete - 10 systems) - positives are bolded : Weightloss (Constitutional), Dry mouth (ENMT), Chest pain (CV), SOB (Respiratory), Constipation (GI), Weakness (MS), Pallor (Skin), TIA Sx (Neuro), Confusion (Psych), Easy bruising (Heme) Physical Exam: (Comprehesive - 8+ 1995 Systems)  
 
(1) Constitutional:  FIO2:   on SpO2: O2 Sat (%): 100 % Patient Vitals for the past 24 hrs: 
 BP Temp Pulse Resp SpO2  
10/18/18 1236 105/59 98.2 °F (36.8 °C) 62 16 100 % (2) ENMT:   moist mucous membranes (3) Respiratory:  breathing easily (4) GI:  no abdominal masses, tenderness, no hepatosplenomegaly, no ventral hernia, stool for occult blood not indicated as urologist.  
(5) :   normal  
(6) Lymphatic:  no adenopathy  
(7) Muscloskeletal:  no gross deformity (8) Skin:  no rash  
(9) Neuro:  no focal deficits Signed By: Jonatan De León MD  - October 18, 2018

## 2018-10-18 NOTE — ROUTINE PROCESS
Bedside shift change report given to Justin Pisano RN (oncoming nurse) by Yris Chavez RN (offgoing nurse). Report included the following information SBAR, Kardex, Intake/Output, MAR, Accordion and Recent Results.

## 2018-10-19 ENCOUNTER — APPOINTMENT (OUTPATIENT)
Dept: INTERVENTIONAL RADIOLOGY/VASCULAR | Age: 53
DRG: 690 | End: 2018-10-19
Attending: UROLOGY
Payer: COMMERCIAL

## 2018-10-19 DIAGNOSIS — E11.9 DIABETES MELLITUS WITHOUT COMPLICATION (HCC): Primary | ICD-10-CM

## 2018-10-19 LAB
ANION GAP SERPL CALC-SCNC: 10 MMOL/L (ref 5–15)
ANION GAP SERPL CALC-SCNC: 10 MMOL/L (ref 5–15)
ANION GAP SERPL CALC-SCNC: 12 MMOL/L (ref 5–15)
ANION GAP SERPL CALC-SCNC: 9 MMOL/L (ref 5–15)
ATRIAL RATE: 70 BPM
BACTERIA SPEC CULT: NORMAL
BASOPHILS # BLD: 0 K/UL (ref 0–0.1)
BASOPHILS NFR BLD: 0 % (ref 0–1)
BUN SERPL-MCNC: 43 MG/DL (ref 6–20)
BUN SERPL-MCNC: 51 MG/DL (ref 6–20)
BUN SERPL-MCNC: 52 MG/DL (ref 6–20)
BUN SERPL-MCNC: 57 MG/DL (ref 6–20)
BUN/CREAT SERPL: 22 (ref 12–20)
BUN/CREAT SERPL: 25 (ref 12–20)
BUN/CREAT SERPL: 28 (ref 12–20)
BUN/CREAT SERPL: 29 (ref 12–20)
CALCIUM SERPL-MCNC: 9 MG/DL (ref 8.5–10.1)
CALCIUM SERPL-MCNC: 9.3 MG/DL (ref 8.5–10.1)
CALCULATED P AXIS, ECG09: 21 DEGREES
CALCULATED T AXIS, ECG11: 16 DEGREES
CC UR VC: NORMAL
CHLORIDE SERPL-SCNC: 94 MMOL/L (ref 97–108)
CHLORIDE SERPL-SCNC: 94 MMOL/L (ref 97–108)
CHLORIDE SERPL-SCNC: 95 MMOL/L (ref 97–108)
CHLORIDE SERPL-SCNC: 99 MMOL/L (ref 97–108)
CO2 SERPL-SCNC: 17 MMOL/L (ref 21–32)
CO2 SERPL-SCNC: 17 MMOL/L (ref 21–32)
CO2 SERPL-SCNC: 20 MMOL/L (ref 21–32)
CO2 SERPL-SCNC: 22 MMOL/L (ref 21–32)
COMMENT, HOLDF: NORMAL
CREAT SERPL-MCNC: 1.79 MG/DL (ref 0.55–1.02)
CREAT SERPL-MCNC: 1.94 MG/DL (ref 0.55–1.02)
CREAT SERPL-MCNC: 1.99 MG/DL (ref 0.55–1.02)
CREAT SERPL-MCNC: 2.08 MG/DL (ref 0.55–1.02)
DIAGNOSIS, 93000: NORMAL
DIFFERENTIAL METHOD BLD: ABNORMAL
EOSINOPHIL # BLD: 0.4 K/UL (ref 0–0.4)
EOSINOPHIL NFR BLD: 3 % (ref 0–7)
ERYTHROCYTE [DISTWIDTH] IN BLOOD BY AUTOMATED COUNT: 12 % (ref 11.5–14.5)
EST. AVERAGE GLUCOSE BLD GHB EST-MCNC: 203 MG/DL
GLUCOSE BLD STRIP.AUTO-MCNC: 154 MG/DL (ref 65–100)
GLUCOSE BLD STRIP.AUTO-MCNC: 207 MG/DL (ref 65–100)
GLUCOSE BLD STRIP.AUTO-MCNC: 229 MG/DL (ref 65–100)
GLUCOSE BLD STRIP.AUTO-MCNC: 260 MG/DL (ref 65–100)
GLUCOSE SERPL-MCNC: 165 MG/DL (ref 65–100)
GLUCOSE SERPL-MCNC: 177 MG/DL (ref 65–100)
GLUCOSE SERPL-MCNC: 234 MG/DL (ref 65–100)
GLUCOSE SERPL-MCNC: 254 MG/DL (ref 65–100)
HBA1C MFR BLD: 8.7 % (ref 4.2–6.3)
HCT VFR BLD AUTO: 28.5 % (ref 35–47)
HGB BLD-MCNC: 9.6 G/DL (ref 11.5–16)
IMM GRANULOCYTES # BLD: 0.2 K/UL (ref 0–0.04)
IMM GRANULOCYTES NFR BLD AUTO: 2 % (ref 0–0.5)
LACTATE SERPL-SCNC: 2.6 MMOL/L (ref 0.4–2)
LYMPHOCYTES # BLD: 2.1 K/UL (ref 0.8–3.5)
LYMPHOCYTES NFR BLD: 19 % (ref 12–49)
MCH RBC QN AUTO: 29.4 PG (ref 26–34)
MCHC RBC AUTO-ENTMCNC: 33.7 G/DL (ref 30–36.5)
MCV RBC AUTO: 87.2 FL (ref 80–99)
MONOCYTES # BLD: 0.9 K/UL (ref 0–1)
MONOCYTES NFR BLD: 9 % (ref 5–13)
NEUTS SEG # BLD: 7 K/UL (ref 1.8–8)
NEUTS SEG NFR BLD: 66 % (ref 32–75)
NRBC # BLD: 0 K/UL (ref 0–0.01)
NRBC BLD-RTO: 0 PER 100 WBC
OSMOLALITY SERPL: 288 MOSM/KG H2O
OSMOLALITY SERPL: 301 MOSM/KG H2O
OSMOLALITY UR: 251 MOSM/KG H2O
P-R INTERVAL, ECG05: 164 MS
PLATELET # BLD AUTO: 481 K/UL (ref 150–400)
PMV BLD AUTO: 8.7 FL (ref 8.9–12.9)
POTASSIUM SERPL-SCNC: 4.4 MMOL/L (ref 3.5–5.1)
POTASSIUM SERPL-SCNC: 4.8 MMOL/L (ref 3.5–5.1)
POTASSIUM SERPL-SCNC: 5.8 MMOL/L (ref 3.5–5.1)
POTASSIUM SERPL-SCNC: 6.2 MMOL/L (ref 3.5–5.1)
Q-T INTERVAL, ECG07: 394 MS
QRS DURATION, ECG06: 94 MS
QTC CALCULATION (BEZET), ECG08: 425 MS
RBC # BLD AUTO: 3.27 M/UL (ref 3.8–5.2)
SAMPLES BEING HELD,HOLD: NORMAL
SERVICE CMNT-IMP: ABNORMAL
SERVICE CMNT-IMP: NORMAL
SODIUM SERPL-SCNC: 121 MMOL/L (ref 136–145)
SODIUM SERPL-SCNC: 122 MMOL/L (ref 136–145)
SODIUM SERPL-SCNC: 126 MMOL/L (ref 136–145)
SODIUM SERPL-SCNC: 130 MMOL/L (ref 136–145)
TSH SERPL DL<=0.05 MIU/L-ACNC: 2.89 UIU/ML (ref 0.36–3.74)
URATE SERPL-MCNC: 7.8 MG/DL (ref 2.6–6)
VENTRICULAR RATE, ECG03: 70 BPM
WBC # BLD AUTO: 10.6 K/UL (ref 3.6–11)

## 2018-10-19 PROCEDURE — 77030002996 HC SUT SLK J&J -A

## 2018-10-19 PROCEDURE — 36415 COLL VENOUS BLD VENIPUNCTURE: CPT | Performed by: UROLOGY

## 2018-10-19 PROCEDURE — 80048 BASIC METABOLIC PNL TOTAL CA: CPT | Performed by: UROLOGY

## 2018-10-19 PROCEDURE — 74011000250 HC RX REV CODE- 250: Performed by: STUDENT IN AN ORGANIZED HEALTH CARE EDUCATION/TRAINING PROGRAM

## 2018-10-19 PROCEDURE — C1769 GUIDE WIRE: HCPCS

## 2018-10-19 PROCEDURE — 80048 BASIC METABOLIC PNL TOTAL CA: CPT | Performed by: INTERNAL MEDICINE

## 2018-10-19 PROCEDURE — 83605 ASSAY OF LACTIC ACID: CPT | Performed by: HOSPITALIST

## 2018-10-19 PROCEDURE — 77030003510 HC NDL BIOP TISS SSPC -A

## 2018-10-19 PROCEDURE — 99153 MOD SED SAME PHYS/QHP EA: CPT

## 2018-10-19 PROCEDURE — 85025 COMPLETE CBC W/AUTO DIFF WBC: CPT | Performed by: UROLOGY

## 2018-10-19 PROCEDURE — 84443 ASSAY THYROID STIM HORMONE: CPT | Performed by: INTERNAL MEDICINE

## 2018-10-19 PROCEDURE — 83935 ASSAY OF URINE OSMOLALITY: CPT | Performed by: INTERNAL MEDICINE

## 2018-10-19 PROCEDURE — 74011250637 HC RX REV CODE- 250/637: Performed by: UROLOGY

## 2018-10-19 PROCEDURE — 74011000250 HC RX REV CODE- 250: Performed by: INTERNAL MEDICINE

## 2018-10-19 PROCEDURE — 83930 ASSAY OF BLOOD OSMOLALITY: CPT | Performed by: INTERNAL MEDICINE

## 2018-10-19 PROCEDURE — 83036 HEMOGLOBIN GLYCOSYLATED A1C: CPT | Performed by: INTERNAL MEDICINE

## 2018-10-19 PROCEDURE — 77030009378 HC DEV TORQ OLCT F/GWIRE MANIP COOK -A

## 2018-10-19 PROCEDURE — C1892 INTRO/SHEATH,FIXED,PEEL-AWAY: HCPCS

## 2018-10-19 PROCEDURE — 87040 BLOOD CULTURE FOR BACTERIA: CPT | Performed by: HOSPITALIST

## 2018-10-19 PROCEDURE — 82962 GLUCOSE BLOOD TEST: CPT

## 2018-10-19 PROCEDURE — 74011250636 HC RX REV CODE- 250/636

## 2018-10-19 PROCEDURE — C1729 CATH, DRAINAGE: HCPCS

## 2018-10-19 PROCEDURE — 84550 ASSAY OF BLOOD/URIC ACID: CPT | Performed by: INTERNAL MEDICINE

## 2018-10-19 PROCEDURE — 77030011229 HC DIL VESL COON COOK -A

## 2018-10-19 PROCEDURE — 65270000029 HC RM PRIVATE

## 2018-10-19 PROCEDURE — 77030010548 HC BG WND DRN ARMD -B

## 2018-10-19 PROCEDURE — 74011250636 HC RX REV CODE- 250/636: Performed by: INTERNAL MEDICINE

## 2018-10-19 PROCEDURE — C1894 INTRO/SHEATH, NON-LASER: HCPCS

## 2018-10-19 PROCEDURE — 74011000258 HC RX REV CODE- 258: Performed by: INTERNAL MEDICINE

## 2018-10-19 PROCEDURE — 74011250636 HC RX REV CODE- 250/636: Performed by: UROLOGY

## 2018-10-19 PROCEDURE — 74011636320 HC RX REV CODE- 636/320: Performed by: STUDENT IN AN ORGANIZED HEALTH CARE EDUCATION/TRAINING PROGRAM

## 2018-10-19 PROCEDURE — 74011250637 HC RX REV CODE- 250/637: Performed by: INTERNAL MEDICINE

## 2018-10-19 PROCEDURE — 87106 FUNGI IDENTIFICATION YEAST: CPT | Performed by: HOSPITALIST

## 2018-10-19 PROCEDURE — C1887 CATHETER, GUIDING: HCPCS

## 2018-10-19 PROCEDURE — 74011636637 HC RX REV CODE- 636/637: Performed by: INTERNAL MEDICINE

## 2018-10-19 PROCEDURE — 74011250636 HC RX REV CODE- 250/636: Performed by: STUDENT IN AN ORGANIZED HEALTH CARE EDUCATION/TRAINING PROGRAM

## 2018-10-19 PROCEDURE — 93005 ELECTROCARDIOGRAM TRACING: CPT

## 2018-10-19 RX ORDER — DEXTROSE 50 % IN WATER (D50W) INTRAVENOUS SYRINGE
12.5-25 AS NEEDED
Status: DISCONTINUED | OUTPATIENT
Start: 2018-10-19 | End: 2018-10-22 | Stop reason: HOSPADM

## 2018-10-19 RX ORDER — ACETAMINOPHEN 325 MG/1
650 TABLET ORAL
Status: DISCONTINUED | OUTPATIENT
Start: 2018-10-19 | End: 2018-10-22 | Stop reason: HOSPADM

## 2018-10-19 RX ORDER — MIDAZOLAM HYDROCHLORIDE 1 MG/ML
5 INJECTION, SOLUTION INTRAMUSCULAR; INTRAVENOUS
Status: DISCONTINUED | OUTPATIENT
Start: 2018-10-19 | End: 2018-10-19

## 2018-10-19 RX ORDER — SODIUM CHLORIDE 450 MG/100ML
75 INJECTION, SOLUTION INTRAVENOUS CONTINUOUS
Status: DISCONTINUED | OUTPATIENT
Start: 2018-10-19 | End: 2018-10-19

## 2018-10-19 RX ORDER — SODIUM CHLORIDE 450 MG/100ML
100 INJECTION, SOLUTION INTRAVENOUS CONTINUOUS
Status: DISCONTINUED | OUTPATIENT
Start: 2018-10-19 | End: 2018-10-20

## 2018-10-19 RX ORDER — LEVOFLOXACIN 5 MG/ML
500 INJECTION, SOLUTION INTRAVENOUS EVERY 24 HOURS
Status: DISCONTINUED | OUTPATIENT
Start: 2018-10-19 | End: 2018-10-20

## 2018-10-19 RX ORDER — SODIUM POLYSTYRENE SULFONATE 15 G/60ML
30 SUSPENSION ORAL; RECTAL
Status: DISCONTINUED | OUTPATIENT
Start: 2018-10-19 | End: 2018-10-19

## 2018-10-19 RX ORDER — SODIUM CHLORIDE 9 MG/ML
25 INJECTION, SOLUTION INTRAVENOUS CONTINUOUS
Status: DISCONTINUED | OUTPATIENT
Start: 2018-10-19 | End: 2018-10-19

## 2018-10-19 RX ORDER — SODIUM CHLORIDE 0.9 % (FLUSH) 0.9 %
5-10 SYRINGE (ML) INJECTION EVERY 8 HOURS
Status: DISCONTINUED | OUTPATIENT
Start: 2018-10-19 | End: 2018-10-22 | Stop reason: HOSPADM

## 2018-10-19 RX ORDER — SODIUM CHLORIDE 9 MG/ML
100 INJECTION, SOLUTION INTRAVENOUS CONTINUOUS
Status: DISCONTINUED | OUTPATIENT
Start: 2018-10-19 | End: 2018-10-19

## 2018-10-19 RX ORDER — SODIUM CHLORIDE 0.9 % (FLUSH) 0.9 %
5-10 SYRINGE (ML) INJECTION AS NEEDED
Status: DISCONTINUED | OUTPATIENT
Start: 2018-10-19 | End: 2018-10-22 | Stop reason: HOSPADM

## 2018-10-19 RX ORDER — INSULIN LISPRO 100 [IU]/ML
INJECTION, SOLUTION INTRAVENOUS; SUBCUTANEOUS EVERY 6 HOURS
Status: DISCONTINUED | OUTPATIENT
Start: 2018-10-19 | End: 2018-10-20

## 2018-10-19 RX ORDER — MAGNESIUM SULFATE 100 %
4 CRYSTALS MISCELLANEOUS AS NEEDED
Status: DISCONTINUED | OUTPATIENT
Start: 2018-10-19 | End: 2018-10-22 | Stop reason: HOSPADM

## 2018-10-19 RX ORDER — SODIUM POLYSTYRENE SULFONATE 15 G/60ML
45 SUSPENSION ORAL; RECTAL
Status: DISCONTINUED | OUTPATIENT
Start: 2018-10-19 | End: 2018-10-19

## 2018-10-19 RX ORDER — SODIUM BICARBONATE 1 MEQ/ML
50 SYRINGE (ML) INTRAVENOUS ONCE
Status: COMPLETED | OUTPATIENT
Start: 2018-10-19 | End: 2018-10-19

## 2018-10-19 RX ORDER — LIDOCAINE HYDROCHLORIDE 20 MG/ML
18 INJECTION, SOLUTION INFILTRATION; PERINEURAL ONCE
Status: COMPLETED | OUTPATIENT
Start: 2018-10-19 | End: 2018-10-19

## 2018-10-19 RX ORDER — SODIUM POLYSTYRENE SULFONATE 15 G/60ML
45 SUSPENSION ORAL; RECTAL
Status: COMPLETED | OUTPATIENT
Start: 2018-10-19 | End: 2018-10-19

## 2018-10-19 RX ORDER — DEXTROSE 50 % IN WATER (D50W) INTRAVENOUS SYRINGE
25 ONCE
Status: COMPLETED | OUTPATIENT
Start: 2018-10-19 | End: 2018-10-19

## 2018-10-19 RX ORDER — FENTANYL CITRATE 50 UG/ML
100 INJECTION, SOLUTION INTRAMUSCULAR; INTRAVENOUS
Status: DISCONTINUED | OUTPATIENT
Start: 2018-10-19 | End: 2018-10-19

## 2018-10-19 RX ORDER — HEPARIN SODIUM 200 [USP'U]/100ML
400 INJECTION, SOLUTION INTRAVENOUS ONCE
Status: DISPENSED | OUTPATIENT
Start: 2018-10-19 | End: 2018-10-19

## 2018-10-19 RX ORDER — SODIUM CHLORIDE 9 MG/ML
125 INJECTION, SOLUTION INTRAVENOUS CONTINUOUS
Status: DISCONTINUED | OUTPATIENT
Start: 2018-10-19 | End: 2018-10-19

## 2018-10-19 RX ADMIN — SODIUM BICARBONATE: 84 INJECTION, SOLUTION INTRAVENOUS at 09:09

## 2018-10-19 RX ADMIN — VANCOMYCIN HYDROCHLORIDE 1000 MG: 1 INJECTION, POWDER, LYOPHILIZED, FOR SOLUTION INTRAVENOUS at 23:23

## 2018-10-19 RX ADMIN — INSULIN LISPRO 2 UNITS: 100 INJECTION, SOLUTION INTRAVENOUS; SUBCUTANEOUS at 08:31

## 2018-10-19 RX ADMIN — DEXTROSE MONOHYDRATE, SODIUM CHLORIDE, AND POTASSIUM CHLORIDE 100 ML/HR: 50; 4.5; 1.49 INJECTION, SOLUTION INTRAVENOUS at 04:12

## 2018-10-19 RX ADMIN — SODIUM POLYSTYRENE SULFONATE 45 G: 15 SUSPENSION ORAL; RECTAL at 10:49

## 2018-10-19 RX ADMIN — SODIUM BICARBONATE 50 MEQ: 84 INJECTION, SOLUTION INTRAVENOUS at 08:44

## 2018-10-19 RX ADMIN — CALCIUM GLUCONATE 1 G: 94 INJECTION, SOLUTION INTRAVENOUS at 10:37

## 2018-10-19 RX ADMIN — FENTANYL CITRATE 25 MCG: 50 INJECTION, SOLUTION INTRAMUSCULAR; INTRAVENOUS at 09:57

## 2018-10-19 RX ADMIN — SODIUM CHLORIDE 25 ML/HR: 900 INJECTION, SOLUTION INTRAVENOUS at 09:15

## 2018-10-19 RX ADMIN — LIDOCAINE HYDROCHLORIDE 400 MG: 20 INJECTION, SOLUTION INFILTRATION; PERINEURAL at 09:46

## 2018-10-19 RX ADMIN — MIDAZOLAM HYDROCHLORIDE 2 MG: 1 INJECTION, SOLUTION INTRAMUSCULAR; INTRAVENOUS at 09:45

## 2018-10-19 RX ADMIN — DIPHENHYDRAMINE HYDROCHLORIDE 25 MG: 50 INJECTION, SOLUTION INTRAMUSCULAR; INTRAVENOUS at 09:16

## 2018-10-19 RX ADMIN — MIDAZOLAM HYDROCHLORIDE 1 MG: 1 INJECTION, SOLUTION INTRAMUSCULAR; INTRAVENOUS at 09:49

## 2018-10-19 RX ADMIN — Medication 5 ML: at 22:00

## 2018-10-19 RX ADMIN — IOPAMIDOL 25 ML: 755 INJECTION, SOLUTION INTRAVENOUS at 10:00

## 2018-10-19 RX ADMIN — FLUCONAZOLE 200 MG: 2 INJECTION, SOLUTION INTRAVENOUS at 13:00

## 2018-10-19 RX ADMIN — INSULIN LISPRO 2 UNITS: 100 INJECTION, SOLUTION INTRAVENOUS; SUBCUTANEOUS at 23:24

## 2018-10-19 RX ADMIN — SODIUM BICARBONATE 2 ML: 0.2 INJECTION, SOLUTION INTRAVENOUS at 10:00

## 2018-10-19 RX ADMIN — INSULIN LISPRO 2 UNITS: 100 INJECTION, SOLUTION INTRAVENOUS; SUBCUTANEOUS at 17:08

## 2018-10-19 RX ADMIN — LEVOFLOXACIN 500 MG: 5 INJECTION, SOLUTION INTRAVENOUS at 20:08

## 2018-10-19 RX ADMIN — MIDAZOLAM HYDROCHLORIDE 1 MG: 1 INJECTION, SOLUTION INTRAMUSCULAR; INTRAVENOUS at 10:01

## 2018-10-19 RX ADMIN — SODIUM CHLORIDE 75 ML/HR: 450 INJECTION, SOLUTION INTRAVENOUS at 17:07

## 2018-10-19 RX ADMIN — FENTANYL CITRATE 50 MCG: 50 INJECTION, SOLUTION INTRAMUSCULAR; INTRAVENOUS at 09:46

## 2018-10-19 RX ADMIN — OXYCODONE HYDROCHLORIDE AND ACETAMINOPHEN 1 TABLET: 7.5; 325 TABLET ORAL at 20:47

## 2018-10-19 RX ADMIN — OXYCODONE HYDROCHLORIDE AND ACETAMINOPHEN 1 TABLET: 7.5; 325 TABLET ORAL at 11:42

## 2018-10-19 RX ADMIN — INSULIN HUMAN 10 UNITS: 100 INJECTION, SOLUTION PARENTERAL at 08:31

## 2018-10-19 RX ADMIN — DEXTROSE MONOHYDRATE 25 G: 25 INJECTION, SOLUTION INTRAVENOUS at 08:31

## 2018-10-19 NOTE — PROGRESS NOTES
Progress Note Patient: Kendall Connelly MRN: [id number]  SSN: [id number] YOB: 1965 Age: 48 y.o. Sex: female Height:   Weight:   BMI: There is no height or weight on file to calculate BMI. Emergency  Contact:  Primary Emergency Contact: Lorie Trent, Yimi Phone: [phone] PCP:   Other, MD Ace None None Hospital Day: 2 - Admitted 10/18/2018 12:10 PM by Molina Torres MD - Full Code Active Hospital Problems Diagnosis Date Noted  Sepsis due to urinary tract infection ([facility] 10/18/2018 * No surgery found *  
* Surgery not found * Assessment/Plan: 
  
· Hyponatermia - Renal Cx · Hyperkalemia - Kayealate, Ca/Insulin, K removed from IVF · Right Hydro - migrated stent, for PCN today. · Fungal UTI r/o sepsis - Diflucan, CX pending, ID consult + Levaquin for now · Urethral stricture - s/p dilation, check PVR · JIGNESH- renal Consult · Diabetes - Blood sugar control Subjective: weak Imaging: N/A Exam:   
ROS:  Denies Chest Pain, SOB Labs: Recent Labs 10/19/18 
031 WBC 10.6 HGB 9.6* HCT 28.5* * Recent Labs 10/19/18 
0315 *  
K 5.8*  
CL 94* CO2 17* * BUN 57* CREA 1.94* CA 9.0 ID: Temp (24hrs), Av.9 °F (36.6 °C), Min:97.4 °F (36.3 °C), Max:98.2 °F (36.8 °C) 
 
10/19/2018: WBC 10.6 K/uL (Ref range: 3.6 - 11.0 K/uL) Current Antimicrobial Therapy (168h ago, onward) Ordered     Start Stop 10/18/18 2004  levoFLOXacin (LEVAQUIN) 750 mg in D5W IVPB  750 mg,   IntraVENous,   EVERY 24 HOURS    
 10/18/18 2100 10/23/18 2059  
 10/18/18 1241  fluconazole (DIFLUCAN) 200mg/100 mL IVPB (premix)  200 mg,   IntraVENous,   EVERY 24 HOURS    
 10/18/18 1300 --  
  
 
Cultures: All Micro Results Procedure Component Value Units Date/Time CULTURE, URINE [[id number]] Collected:  10/18/18 1512 Order Status:  Completed Specimen:  Urine from Clean catch Updated:  10/18/18 6150 CULTURE, BLOOD [[id number]] Collected:  10/18/18 1328 Order Status:  Completed Specimen:  Whole Blood Updated:  10/18/18 1344 GI: Intake: DIET NPO Last Bowel Movement Date: 10/18/18 Patient Vitals for the past 168 hrs: 
 Stool Occurrence(s)  
10/18/18 1830 2 Pain: 0/10   -   - Current Analgesic Therapy (168h ago, onward) Ordered     Start Stop 10/18/18 1241  oxyCODONE-acetaminophen (PERCOCET 7.5) 7.5-325 mg per tablet 1 Tab  1 Tab,   Oral,   EVERY 4 HOURS AS NEEDED    
 10/18/18 1235 --  
  
  
:   
  -     
10/19/2018: Creatinine 1.94 MG/DL* (Ref range: 0.55 - 1.02 MG/DL) Vitals:   @ Patient Vitals for the past 24 hrs: 
 BP Temp Pulse Resp SpO2  
10/19/18 0312 129/89 97.4 °F (36.3 °C) 67 16 98 % 10/18/18 2359 124/49 98 °F (36.7 °C) 74 16 96 % 10/18/18 2051 106/69 98 °F (36.7 °C) 70 16 98 % 10/18/18 1503 96/60 97.9 °F (36.6 °C) (!) 58 16 100 % 10/18/18 1236 105/59 98.2 °F (36.8 °C) 62 16 100 % I&O's:   
Date 10/18/18 0700 - 10/19/18 2184 10/19/18 0700 - 10/20/18 0321 Shift 9690-1945 7167-3086 24 Hour Total 4846-9641 6132-7747 24 Hour Total  
INTAKE  
I.V. 445.3 406.7 851.9 Volume (dextrose 5% - 0.45% NaCl with KCl 20 mEq/L infusion) 445. 3 256.7 701.9 Volume (levoFLOXacin (LEVAQUIN) 750 mg in D5W IVPB)  150 150 Shift Total 445.3 406.7 851.9     
OUTPUT Urine  1200 1200 Urine Voided  1200 1200 Urine Occurrence(s) 8 x  8 x Stool Stool Occurrence(s) 2 x  2 x Shift Total  1200 1200 .3 -793.3 -348. 1 Weight (kg) Meds:   
Current Facility-Administered Medications:  
  0.9% sodium chloride infusion, 100 mL/hr, IntraVENous, CONTINUOUS, Adriana Ag MD 
  sodium chloride (NS) flush 5-10 mL, 5-10 mL, IntraVENous, Q8H, LeWilian MD 
  sodium chloride (NS) flush 5-10 mL, 5-10 mL, IntraVENous, PRN, Herminio Rodgers MD 
   insulin regular (NOVOLIN R, HUMULIN R) injection 10 Units, 10 Units, IntraVENous, ONCE, Katja Kingston MD 
  dextrose (D50W) injection syrg 25 g, 25 g, IntraVENous, ONCE, Katja Kingston MD 
  sodium polystyrene (KAYEXALATE) 15 gram/60 mL oral suspension 30 g, 30 g, Oral, NOW, Katja Kingston MD 
  ondansetron (ZOFRAN) injection 4 mg, 4 mg, IntraVENous, Q4H PRN, Klaus Mart MD 
  docusate sodium (COLACE) capsule 100 mg, 100 mg, Oral, BID, Klaus Mart MD, 100 mg at 10/18/18 1345   oxybutynin (DITROPAN) tablet 5 mg, 5 mg, Oral, TID PRN, Paula Burnett MD, 5 mg at 10/18/18 2247   oxyCODONE-acetaminophen (PERCOCET 7.5) 7.5-325 mg per tablet 1 Tab, 1 Tab, Oral, Q4H PRN, Klaus Mart MD 
  zolpidem (AMBIEN) tablet 5 mg, 5 mg, Oral, QHS PRN, Paula Burnett MD, 5 mg at 10/18/18 2333   cloNIDine HCl (CATAPRES) tablet 0.2 mg, 0.2 mg, Oral, PRN, Klaus Jha MD 
  fluconazole (DIFLUCAN) 200mg/100 mL IVPB (premix), 200 mg, IntraVENous, Q24H, Klaus Mart MD, Last Rate: 100 mL/hr at 10/18/18 1345, 200 mg at 10/18/18 1345   diphenhydrAMINE (BENADRYL) injection 50 mg, 50 mg, IntraVENous, ONCE PRN, Klaus Mart MD 
  levoFLOXacin (LEVAQUIN) 750 mg in D5W IVPB, 750 mg, IntraVENous, Q24H, Reshma Bentley MD, Last Rate: 100 mL/hr at 10/18/18 2016, 750 mg at 10/18/18 2016 Signed By: Stanislaw Briscoe MD - October 19, 2018

## 2018-10-19 NOTE — PROGRESS NOTES
Len Dickson  
 
 
 
NAME:Eliz Turcios  JXP:[id number]   :1965  
 
                k improved Na increased to 130 Rapid rise in na Give hypotonic ivf Repeat labs at 9 pm 
 
 
Trina Reddy MD 
[facility] [address] [facility] [place], [address] [place] Phone - [phone] Fax - [phone] [address] [facility] Phone - [phone] Fax - [phone]    
[email or web address]

## 2018-10-19 NOTE — PROGRESS NOTES
TRANSFER - OUT REPORT: 
 
Verbal report given to Renetta Rodriguez RN(name) on Mateo Crews  being transferred to 211(unit) for routine progression of care Report consisted of patients Situation, Background, Assessment and  
Recommendations(SBAR). Information from the following report(s) Procedure Summary was reviewed with the receiving nurse. Lines:  
Peripheral IV 10/18/18 Anterior; Left Antecubital (Active) Site Assessment Clean, dry, & intact 10/19/2018  6:46 AM  
Phlebitis Assessment 0 10/19/2018  6:46 AM  
Infiltration Assessment 0 10/19/2018  6:46 AM  
Dressing Status Clean, dry, & intact 10/19/2018  6:46 AM  
Dressing Type Transparent;Tape 10/19/2018  6:46 AM  
   
Peripheral IV 10/19/18 Anterior;Distal;Inferior;Left;Lower Arm (Active) Opportunity for questions and clarification was provided.

## 2018-10-19 NOTE — CONSULTS
IP CONSULT TO NEPHROLOGY  Consult performed by: Erasto Bach MD  Consult ordered by: Sandy Grajeda MD        Nephrology Consult Note     Len Yessi     www. [employer]              Phone - [phone]   Patient: Danielle Maxwell   YOB: 1965    Date- 10/19/2018  MRN: [id number]             REASON FOR CONSULTATION:HYPONATREMIA, HYPERKALEMIA  CONSULTING PHYSICIAN: DR. MILLIGAN    ADMIT DATE:10/18/2018 PATIENT PCP:Otoniel, MD Ace     ASSESSMENT:   · Hyponatremia - multiple etiology , hypotonic ivf, poor po intake  · Hyperkalemia due to arf and iv kcl  · nelda due to left hydro,, poor po intake, napreoxen use, ? AIN due to cipro  · H/o multiple UTI - bactrim and cipro use 1 month ago. Recently on diflucan  · H/o renal stone  · H/o left hydro  · [place]. acidosis  · H/o urethral stricture- s/p dilatation  · Fungal UTI  · H/o DM   · H/ HTN        Active Problems:    Sepsis due to urinary tract infection ([address] (10/18/2018)      PLAN:   - stop . 45 nacl with kcl  - start . 45 nacl with bicarb  - iv calcium  - iv insulin + d 50  - kayexalate 45 gram  - repeat labs this pm  - plan for percut nephrostomy tube placement noted  - ID input noted  - Check serum osmo  - check renal usg if cr. Get worse in am  - check urine lytes  - avoid NSAIDS  -     [x] High complexity decision making was performed  [x] Patient is at high-risk of decompensation with multiple organ involvement    Subjective:   HPI: Danielle Maxwell is a 48 y.o.  female. She has pmh of dm, multiple UTI, ureteric reflux. she was seen by DR. Mart yesterday in the office and she was admitted for UTI. She has been treated with multiple abx recently.  She received bactrim and cipro last month  She was started diflucan last week  She reports poor po intake, she has been taking naproxen  She has h/o ureteral reflux requiring replantation of ureter in past  She required cystoscopy and stent placement in past  She has h/o DM and recently she has started metformin  No c/o sob, fever. No c/o vomiting  No c/o chest pain  No diarrhea  Feels weak    Review of Systems:       A 11 point review of system was performed today. Pertinent positives and negatives are mentioned in the HPI. The reminder of the ROS is negative and noncontributory. Past Medical History:   Diagnosis Date    Diabetes ([address]     H/O urethral stricture     Hydronephrosis, left     Hypertension     Kidney infection 06/2018    Renal stone     Unilateral reflux nephropathy     UTI (urinary tract infection)       Past Surgical History:   Procedure Laterality Date    CYSTOSCOPY,INSERT URETERAL STENT      HX OTHER SURGICAL  dilatation of urethral stricture    HX OTHER SURGICAL  h/o left reimplant for VURD    HX UROLOGICAL      kidney 2006      Prior to Admission medications    Medication Sig Start Date End Date Taking? Authorizing Provider   propranolol (INDERAL) 40 mg tablet Take 40 mg by mouth two (2) times a day. Yes Other, MD Ace     Allergies   Allergen Reactions    Dilaudid [Hydromorphone] Anaphylaxis    Keflex [Cephalexin] Rash    Pcn [Penicillins] Swelling    Shellfish Derived Swelling      Social History     Tobacco Use    Smoking status: Former Smoker    Smokeless tobacco: Never Used   Substance Use Topics    Alcohol use: Yes     Alcohol/week: 1.8 oz     Types: 3 Shots of liquor per week     Comment: monthly      History reviewed. No pertinent family history.      Objective:      Patient Vitals for the past 24 hrs:   Temp Pulse Resp BP SpO2   10/19/18 0913 98.6 °F (37 °C) 70 18 118/80 98 %   10/19/18 0748 98.1 °F (36.7 °C) 71 16 121/79 97 %   10/19/18 0312 97.4 °F (36.3 °C) 67 16 129/89 98 %   10/18/18 2359 98 °F (36.7 °C) 74 16 124/49 96 %   10/18/18 2051 98 °F (36.7 °C) 70 16 106/69 98 %   10/18/18 1503 97.9 °F (36.6 °C) (!) 58 16 96/60 100 %   10/18/18 1236 98.2 °F (36.8 °C) 62 16 105/59 100 %     10/19 0701 - 10/19 1900  In: - Out: 900 [Urine:900]  Physical Exam:  General:Alert, No distress,   Eyes:No scleral icterus, No conjunctival pallor  Neck:Supple,no mass palpable,no thyromegaly  Lungs:Clears to auscultation Bilaterally, normal respiratory effort  CVS:RRR, S1 S2 normal,  No rub,  Abdomen:Soft, Non tender, No hepatosplenomegaly  Extremities:  LE edema  Skin:No rash or lesions, Warm and DRY   Psych: appropriate affect    :  No rod  Musculoskeletal : no redness, no joint tenderness  NEURO: Normal Speech, Non focal        CODE STATUS:  full  Care Plan discussed with:  Patient , nurse, ,      Chart reviewed.      TOTAL TIME: 76 Minutes   y Reviewed previous records   y Discussion with patient and/or family and questions answered   y >50% of visit spent in counseling and coordination of care        ECG[de-identified] Rev:no  Xray/CT/US/MRI REV:no  Lab Data Personally Reviewed: (see below)  Recent Labs     10/19/18  0753 10/19/18  0315   WBC  --  10.6   HGB  --  9.6*   PLT  --  481*   ANEU  --  7.0   * 121*   K 6.2* 5.8*   * 165*   BUN 51* 57*   CREA 1.79* 1.94*   CA 9.0 9.0     Lab Results   Component Value Date/Time    Color DARK YELLOW 06/10/2018 01:39 PM    Appearance CLEAR 06/10/2018 01:39 PM    Specific gravity 1.025 06/10/2018 01:39 PM    pH (UA) 5.5 06/10/2018 01:39 PM    Protein NEGATIVE  06/10/2018 01:39 PM    Glucose >1,000 (A) 06/10/2018 01:39 PM    Ketone NEGATIVE  06/10/2018 01:39 PM    Bilirubin NEGATIVE  06/10/2018 01:39 PM    Urobilinogen 0.2 06/10/2018 01:39 PM    Nitrites POSITIVE (A) 06/10/2018 01:39 PM    Leukocyte Esterase NEGATIVE  06/10/2018 01:39 PM    Epithelial cells MODERATE (A) 06/10/2018 01:39 PM    Bacteria 1+ (A) 06/10/2018 01:39 PM    WBC 5-10 06/10/2018 01:39 PM    RBC 0-5 06/10/2018 01:39 PM       No results found for: IRON, FE, TIBC, IBCT, PSAT, FERR  Lab Results   Component Value Date/Time    Culture result: NO GROWTH AFTER 18 HOURS 10/18/2018 01:28 PM    Culture result: MIXED UROGENITAL KIRK ISOLATED 06/10/2018 01:39 PM    Culture result: ENTEROCOCCUS FAECALIS GROUP D 08/23/2016 08:14 AM     Prior to Admission Medications   Prescriptions Last Dose Informant Patient Reported? Taking?   propranolol (INDERAL) 40 mg tablet 10/18/2018 at 0900  Yes Yes   Sig: Take 40 mg by mouth two (2) times a day. Facility-Administered Medications: None     Imaging:    Medications list Personally Reviewed   [x]      Yes     []               No    Thank you for allowing us to participate in the care this patient. We will follow patient with you. Signed By: Ananda Mauro MD  [facility]  [facility] HospitalJANETTE [facility] [place] [address], [address] [place], [address]  Phone - [phone]         Fax - [phone] [facility] Office  [address] [place] [address]  Phone - [phone]        Fax - [phone]     [email or web address]

## 2018-10-19 NOTE — DIABETES MGMT
DTC Consult Note Recommendations/ Comments: Pt currently NPO. Current CrCl is ~ 35 mL/min and therefore would not recommend resuming metformin at this time. BG was 154 mg/dL pre-lunch today. If BG trends upward throughout the day over the week, consider adding Tradjenta 5 mg to help with post-prandial BG spikes. Current hospital DM medication: lispro correction - high sensitivity  q 6 hours Consult received for:  []             Assessment of home management 
              []      Medication Recommendations []             Meter/monitoring 
   []             Insulin instruction []             New diagnosis [x]             Outpatient education []             Insulin pump patient []             Insulin infusion 
   []             DKA/HHS Chart reviewed and initial evaluation complete on [facility]. Patient is a 48 y.o. female with DM dx ~ 2 years ago, was formerly diet controlled , but per PCP request, began metformin and checking BG. Pt was started on metformin ~ 1 week ago and is unsure of dose (was taking 1x/daily in the evening). Pt just started checking BG 2-3 days before coming to hospital and reported evening BG of 225 mg/dL. Pt shared that she has had a loss in appetite , has been eating chicken noodle soup in the last month. Prior to loss in appetite, pt reports eating a lot of take out and fast food as she and her  have been working 70+ hours /week and do not have time to cook. Assessed and instructed patient on the following:  
·  blood sugar goals, complications of diabetes mellitus, illness management - discussed importance of reducing portions when eating out and using MyPlate method even when eating out, discussed how checking BG can help manage BG and importance in f/u with PCP if BG are above or below target, SMBG skills and referred to Diabetes Educator.  Pt has not had DM education in the past. DTC schedule outpt education for 18. Encouraged the following:  
· dietary modifications: Discussed using MyPlate method, portion control when eating out to help limit excess CHO intake, regular blood sugar monitorin-3x/daily Provided patient with the following: [x]             Survival skills education materials []             Insulin education materials []             CHO counting education materials [x]             Outpatient DTC contact number 
             []             Glucometer Discussed with patient and/or family need for follow up appointment for diabetes management after discharge. Pt was agreeable to set appointment with DTC on  after 2 pm or in evening for class. Pt reports that current PCP is  Dr. Florencia Benites. A1c:  
Lab Results Component Value Date/Time Hemoglobin A1c 8.7 (H) 10/19/2018 03:15 AM  
 
 
Recent Glucose Results:  
Lab Results Component Value Date/Time  (H) 10/19/2018 07:53 AM  
  (H) 10/19/2018 03:15 AM  
 GLUCPOC 154 (H) 10/19/2018 11:44 AM  
 GLUCPOC 207 (H) 10/19/2018 07:51 AM  
 GLUCPOC 187 (H) 10/18/2018 09:07 PM  
  
 
Lab Results Component Value Date/Time Creatinine 1.79 (H) 10/19/2018 07:53 AM  
 
Estimated Creatinine Clearance: 35.3 mL/min (A) (based on SCr of 1.79 mg/dL (H)). Active Orders Diet DIET DIABETIC CONSISTENT CARB Regular PO intake: No data found. Will continue to follow as needed. Thank you. Kylie Lopez RD [facility] Office: [phone]

## 2018-10-19 NOTE — PROGRESS NOTES
Nutrition Assessment: 
 
RECOMMENDATIONS:  
Continue Diabetic diet as tolerated If K+ remains elevated, would add K+ restriction RD to add Nepro BID  
 
DIETITIANS INTERVENTIONS/PLAN:  
Continue diet as tolerated Add PO supplements Monitor K+ and BG 
Monitor appetite ASSESSMENT:  
 
Meets Criteria for Chronic Malnutrition  
[x] Severe Malnutrition, as evidenced by: 
 [] Severe muscle wasting, loss of subcutaneous fat 
 [x] Nutritional intake of <75% of recommended intake for >1 month 
 [x] Weight loss of  >5% in 1 month, >7.5% in 3 months, >10% in 6 months, >20% in 1 year 
 [] Severe edema  
[]Moderate Malnutrition, as evidenced by: 
 [] Mild muscle wasting, loss of subcutaneous fat 
 [] Nutritional intake <75% of recommended intake for >1 month 
 [] Weight loss of  5% in 1 month, 7.5% in 3 months, 10% in 6 months, or 20% in 1 year 
 [] Mild edema Pt admitted with urosepsis. PMH: DM. HTN. Chart revewed, pt lying in bed awake and alert. MST triggered for wt loss and poor appetite. Pt reports she is down 30lb since June 2018. She did not have a poor appetite at first but developed poor appetite and is eating much less over the past 2 and a half months. She attributed earlier wt loss this summer initially to her DM which was uncontrolled. Pt is s/p nephrostomy tube placement. K+ 6.2, should come down with intervention. Given poor appetite will hold off on adding a restriction. She is open to trying PO supplements, will start with Nepro as it is low K+ and diabetic friendly. I explained that as her K+ normalizes she can switch to Glucerna. SUBJECTIVE/OBJECTIVE: Aditya Vazquez had to put me on medication for my DM, before that it was uncontrolled\" Diet Order: Consistent carb 
% Eaten:  No data found. Pertinent Medications:colace, diflucan, humalog, levaquin; Edson@[email or web address]). Chemistries: 
Lab Results Component Value Date/Time  Sodium 122 (L) 10/19/2018 07:53 AM  
 Potassium 6.2 (H) 10/19/2018 07:53 AM  
 Chloride 95 (L) 10/19/2018 07:53 AM  
 CO2 17 (L) 10/19/2018 07:53 AM  
 Anion gap 10 10/19/2018 07:53 AM  
 Glucose 177 (H) 10/19/2018 07:53 AM  
 BUN 51 (H) 10/19/2018 07:53 AM  
 Creatinine 1.79 (H) 10/19/2018 07:53 AM  
 BUN/Creatinine ratio 28 (H) 10/19/2018 07:53 AM  
 GFR est AA 36 (L) 10/19/2018 07:53 AM  
 GFR est non-AA 30 (L) 10/19/2018 07:53 AM  
 Calcium 9.0 10/19/2018 07:53 AM  
 AST (SGOT) 72 (H) 06/10/2018 02:15 PM  
 Alk. phosphatase 168 (H) 06/10/2018 02:15 PM  
 Protein, total 7.9 06/10/2018 02:15 PM  
 Albumin 3.8 06/10/2018 02:15 PM  
 Globulin 4.1 (H) 06/10/2018 02:15 PM  
 A-G Ratio 0.9 (L) 06/10/2018 02:15 PM  
 ALT (SGPT) 76 06/10/2018 02:15 PM  
  
Anthropometrics: Height: 5' 7\" (170.2 cm) Weight: 72.6 kg (160 lb)  []bed scale    [x]stated(10/19)   []unknown IBW (%IBW):   ( ) UBW (%UBW): 86.2 kg (190 lb)(June 2018) (84.21 %) BMI: Body mass index is 25.06 kg/m². This BMI is indicative of: 
[]Underweight   [x]Normal   []Overweight   [] Obesity   [] Extreme Obesity (BMI>40) Estimated Nutrition Needs (Based on): 9790 Kcals/day(MSJ 1365 x 1.3) , 58 g(0.8gPro/kg) Protein Carbohydrate: At Least 130 g/day  Fluids: 1800 mL/day Last BM: 10/18   []Active     []Hyperactive  []Hypoactive       [] Absent   BS Skin:    [x] Intact   [] Incision  [] Breakdown   [] DTI   [] Tears/Excoriation/Abrasion  []Edema [] Other: Wt Readings from Last 30 Encounters:  
10/19/18 72.6 kg (160 lb)  
06/10/18 86.5 kg (190 lb 11.2 oz) 08/23/16 87.4 kg (192 lb 10.9 oz) NUTRITION DIAGNOSES:  
Problem:  Unintended weight loss Etiology: related to poor appetite and uncontrolled DM Signs/Symptoms: as evidenced by reports 15.8% wt loss over 4 months and poor appetite x 2.5 months. NUTRITION INTERVENTIONS: 
Meals/Snacks: General/healthful diet   Supplements: Commercial supplement GOAL:  
 Pt will consume >70% of meals/supplements in 2-4 days Cultural, Muslim, or Ethnic Dietary Needs: None LEARNING NEEDS (Diet, Food/Nutrient-Drug Interaction):  
 [x] None Identified 
 [] Identified and Education Provided/Documented 
 [] Identified and Pt declined/was not appropriate [x] Interdisciplinary Care Plan Reviewed/Documented  
 [x] Participated in Discharge Planning: To be determined (may need K restriction) [] Interdisciplinary Rounds NUTRITION RISK:  
 [x] High              [] Moderate           []  Low  []  Minimal/Uncompromised PT SEEN FOR:  
 []  MD Consult: []Calorie Count []Diabetic Diet Education []Diet Education []Electrolyte Management []General Nutrition Management and Supplements []Management of Tube Feeding []TPN Recommendations [x]  RN Referral:  [x]MST score >=2 
   []Enteral/Parenteral Nutrition PTA []Pregnant: Gestational DM or Multigestation  
[]  Low BMI []  Re-Screen  
[]  LOS []  NPO/clears x 5 days []  New TF/TPN Karlos Mitchell RD, [facility] Pager [phone] Weekend Pager [phone]

## 2018-10-19 NOTE — CONSULTS
Hospitalist Consultation Note    NAME:  Rodrigo Akins   :   1965   MRN:   [id number]     ATTENDING: Jose Terrell MD  PCP:  Deb Lombardi MD    Date/Time:  10/19/2018 7:39 AM      Recommendations/Plan:     Euvolemic Hyponatremia  Hyperkalemia  JOSE  -does not appear to be volume depleted. ? SIADH vs. Endocrinopathy vs low solutes  -agree with stopping 1/2NS with KCl  -cont' NS   -check TSH, cortisol, uric acid, serum and urine osmolality  -repeat BMP STAT, discussed with nurse to administer kayexalate and insulin and dextrose if repeat K remains high. -reverse K with kayexalate and insulin and dextrose  -monitor BMP  -nephrology consulted    T2DM  -currently NPO  -will add SSI with q6h accuchecks    Recurrent UTI with L hydroureteronephrosis   -manage by primary team.  For PCN today by IR  -abx per ID      Code Status: full  DVT Prophylaxis: per primary team          Subjective:   REQUESTING PHYSICIAN: Dr Alber Thompson:    Hyperkalemia, hyponatremia  Irvin Code is a 48 y.o.  female who I was asked to see for hyperkalemia and sudden onset of hyponatremia. Pt admitted for recurrent UTI with L hydroureteronephrosis, scheduled for L PCN placement today. She denies any acute complaints. We were consulted for management of hyperkalemia and hyponatremia. No fever, chills, cp, sob, palpitations, n/v/d. Past Medical History:   Diagnosis Date    Diabetes ([facility] [address]     Hypertension     Kidney infection 2018      Past Surgical History:   Procedure Laterality Date    HX UROLOGICAL      kidney      Social History     Tobacco Use    Smoking status: Former Smoker    Smokeless tobacco: Never Used   Substance Use Topics    Alcohol use: Yes     Alcohol/week: 1.8 oz     Types: 3 Shots of liquor per week     Comment: monthly      History reviewed. No pertinent family history.     Allergies   Allergen Reactions    Dilaudid [Hydromorphone] Anaphylaxis    Keflex [Cephalexin] Rash    Pcn [Penicillins] Swelling    Shellfish Derived Swelling      Prior to Admission medications    Medication Sig Start Date End Date Taking? Authorizing Provider   propranolol (INDERAL) 40 mg tablet Take 40 mg by mouth two (2) times a day. Yes Other, MD Ace       REVIEW OF SYSTEMS:     Total of 12 systems reviewed as follows:   I am not able to complete the review of systems because: The patient is intubated and sedated    The patient has altered mental status due to his acute medical problems    The patient has baseline aphasia from prior stroke(s)    The patient has baseline dementia and is not reliable historian                 POSITIVE= underlined text  Negative = text not underlined  General:  fever, chills, sweats, generalized weakness, weight loss/gain,      loss of appetite   Eyes:    blurred vision, eye pain, loss of vision, double vision  ENT:    rhinorrhea, pharyngitis   Respiratory:   cough, sputum production, SOB, wheezing, BRISENO, pleuritic pain   Cardiology:   chest pain, palpitations, orthopnea, PND, edema, syncope   Gastrointestinal:  abdominal pain , N/V, dysphagia, diarrhea, constipation, bleeding   Genitourinary:  frequency, urgency, dysuria, hematuria, incontinence   Muskuloskeletal :  arthralgia, myalgia   Hematology:  easy bruising, nose or gum bleeding, lymphadenopathy   Dermatological: rash, ulceration, pruritis   Endocrine:   hot flashes or polydipsia   Neurological:  headache, dizziness, confusion, focal weakness, paresthesia,     Speech difficulties, memory loss, gait disturbance  Psychological: Feelings of anxiety, depression, agitation    Objective:   VITALS:    Visit Vitals  /89   Pulse 67   Temp 97.4 °F (36.3 °C)   Resp 16   SpO2 98%   Breastfeeding? No     Temp (24hrs), Av.9 °F (36.6 °C), Min:97.4 °F (36.3 °C), Max:98.2 °F (36.8 °C)      PHYSICAL EXAM:   General:    Alert, cooperative, no distress, appears stated age.      HEENT: Atraumatic, anicteric sclerae, pink conjunctivae     No oral ulcers, mucosa moist, throat clear  Neck:  Supple, symmetrical,  thyroid: non tender  Lungs:   Clear to auscultation bilaterally. No Wheezing or Rhonchi. No rales. Chest wall:  No tenderness  No Accessory muscle use. Heart:   Regular  rhythm,  No  murmur   No edema  Abdomen:   Soft, non-tender. Not distended. Bowel sounds normal  Extremities: No cyanosis. No clubbing  Skin:     Not pale. Not Jaundiced  No rashes   Psych:  Good insight. Not depressed. Not anxious or agitated. Neurologic: EOMs intact. No facial asymmetry. No aphasia or slurred speech. Symmetrical strength, Alert and oriented X 4.     _______________________________________________________________________  Care Plan discussed with:    Comments   Patient x    Family  x    RN     Care Manager                    Consultant:  x    ____________________________________________________________________  TOTAL TIME:     65 mins    Comments    x Reviewed previous records   >50% of visit spent in counseling and coordination of care x Discussion with patient and/or family and questions answered       Critical Care Provided     Minutes non procedure based  ________________________________________________________________________  Signed: Alex Cummins MD      Procedures: see electronic medical records for all procedures/Xrays and details which were not copied into this note but were reviewed prior to creation of Plan.     LAB DATA REVIEWED:    Recent Results (from the past 24 hour(s))   LACTIC ACID    Collection Time: 10/18/18  1:28 PM   Result Value Ref Range    Lactic acid 1.9 0.4 - 2.0 MMOL/L   GLUCOSE, POC    Collection Time: 10/18/18  9:07 PM   Result Value Ref Range    Glucose (POC) 187 (H) 65 - 100 mg/dL    Performed by Amber Lyons (PCT)    HEMOGLOBIN A1C WITH EAG    Collection Time: 10/19/18  3:15 AM   Result Value Ref Range    Hemoglobin A1c 8.7 (H) 4.2 - 6.3 %    Est. average glucose 203 mg/dL   CBC WITH AUTOMATED DIFF    Collection Time: 10/19/18  3:15 AM   Result Value Ref Range    WBC 10.6 3.6 - 11.0 K/uL    RBC 3.27 (L) 3.80 - 5.20 M/uL    HGB 9.6 (L) 11.5 - 16.0 g/dL    HCT 28.5 (L) 35.0 - 47.0 %    MCV 87.2 80.0 - 99.0 FL    MCH 29.4 26.0 - 34.0 PG    MCHC 33.7 30.0 - 36.5 g/dL    RDW 12.0 11.5 - 14.5 %    PLATELET 617 (H) 351 - 400 K/uL    MPV 8.7 (L) 8.9 - 12.9 FL    NRBC 0.0 0  WBC    ABSOLUTE NRBC 0.00 0.00 - 0.01 K/uL    NEUTROPHILS 66 32 - 75 %    LYMPHOCYTES 19 12 - 49 %    MONOCYTES 9 5 - 13 %    EOSINOPHILS 3 0 - 7 %    BASOPHILS 0 0 - 1 %    IMMATURE GRANULOCYTES 2 (H) 0.0 - 0.5 %    ABS. NEUTROPHILS 7.0 1.8 - 8.0 K/UL    ABS. LYMPHOCYTES 2.1 0.8 - 3.5 K/UL    ABS. MONOCYTES 0.9 0.0 - 1.0 K/UL    ABS. EOSINOPHILS 0.4 0.0 - 0.4 K/UL    ABS. BASOPHILS 0.0 0.0 - 0.1 K/UL    ABS. IMM.  GRANS. 0.2 (H) 0.00 - 0.04 K/UL    DF AUTOMATED     METABOLIC PANEL, BASIC    Collection Time: 10/19/18  3:15 AM   Result Value Ref Range    Sodium 121 (L) 136 - 145 mmol/L    Potassium 5.8 (H) 3.5 - 5.1 mmol/L    Chloride 94 (L) 97 - 108 mmol/L    CO2 17 (L) 21 - 32 mmol/L    Anion gap 10 5 - 15 mmol/L    Glucose 165 (H) 65 - 100 mg/dL    BUN 57 (H) 6 - 20 MG/DL    Creatinine 1.94 (H) 0.55 - 1.02 MG/DL    BUN/Creatinine ratio 29 (H) 12 - 20      GFR est AA 33 (L) >60 ml/min/1.73m2    GFR est non-AA 27 (L) >60 ml/min/1.73m2    Calcium 9.0 8.5 - 10.1 MG/DL       _____________________________  Hospitalist: Ofelia Verdin MD

## 2018-10-19 NOTE — PROGRESS NOTES
Problem: Falls - Risk of 
Goal: *Absence of Falls Document Jose M Khan Fall Risk and appropriate interventions in the flowsheet. Fall Risk Interventions: 
  
 
  
 
Medication Interventions: Teach patient to arise slowly, Evaluate medications/consider consulting pharmacy

## 2018-10-19 NOTE — CONSULTS
Infectious Disease Consult  Genie Madrid MD [facility]    Date of Consultation:  October 18, 2018    Referring Physician: Dr Rolanda Sales:     Patient is a 48 y.o. female who is being seen for \"fungal UTI\"     IMPRESSION:   · Recurrent UTI , last Urine culture + for yeast , no ID / sensitivities per Urology note, UC not available . · Left hydroureteronephrosis chronic,  now worsened   · H/o stent placement last week, distally migrated as seen on CT abd/ pelvis  · H/o vesico ureteral reflux disease , L/ureter reimplant 12 years ago  · Small left ureteral calculus , bladder wall thickening also seen on CT   · H/o urethral dilatations, more frequent in past 4 months( June)   · Diabetes mellitus x 26 years  last A1c 9.0 per pt  · No regular Diabetes management ,recently placed on metformin  · 30 lb weight loss since June 2018  · Pt has PCN / Cephalosporin allergy with hives       PLAN:      · Repeat UC, BC  · Current UA unremarkable -+ for Bacteria 1+, no yeast , nitriite + , leucocyte esterase -, wbc 5-10  · Continue Levaquin / Fluconazole IV  · A1c / accuchecks /blood sugar control  · D/w pt & spouse plan of care / importance of good blood sugar control  · For left  PCN placement  in am by MODESTO     Dileepmonkaty Maxwell is a 48 y.o. female with history of Vesicoureteral reflux disorder s/p reimplanted left ureter 12 years ago the patient gives history of recurrent UTIs of recent onset. She has failed out patient therapy as per Urology note . Pts last UC was positive for yeast ( Report  did not have identification or medication sensitivities) Pt  failed  outpatient treatment with fluconazole & Cipro . She was on both antimicrobials for about 1 1/2 months   Pt has  chronic left hydro uretero nephrosis  (noobstructive by remote renal scan),Urethral stricture s/p multiple dilatations . Pt has had bacterial UTIs in the past.Recent CT abd /pelvis showed worsened hydro uretero nephrosis  on left.  As per Urology note a stent was placed and purulent material was drained and cultured eventually returning yeast.   Pt had UA done on admission . It was significant for WBC 5-10, bacteria 1+, no yeast ,hyaline casts + , leucocyte esterase negative Ulcerative Colitis pending, BC also pending . Pt seen today on floor . She has dysuria , passing small amount of urine . Supra pubic pain &  left flank pain . Pt says she has a PCN & Keflex alllergy   CT abd/pelvis (10/8/18)  KIDNEYS/URETERS: There is moderate to severe hydroureteronephrosis on the left. Ureteral wall thickening. Bladder wall thickening as well. Distal ureteral   calculus. There are separate ureteral insertions. Linford Joelle STOMACH: Unremarkable. SMALL BOWEL: No dilatation or wall thickening. COLON: No dilatation or wall thickening. APPENDIX: Unremarkable. PERITONEUM:      Retroperitoneal lymph nodes. No enlarged mesenteric lymph nodes. Linford Joelle RETROPERITONEUM: No lymphadenopathy or aortic aneurysm. REPRODUCTIVE ORGANS:   URINARY BLADDER: Bladder wall thickening with associated inflammatory   stranding. Linford Joelle BONES: Canal stenosis at L4-5 and L4-5 S1. Foraminal stenoses at L5-S1 as well. ADDITIONAL COMMENTS: N/A      Impression     IMPRESSION:   Moderate to severe increased left hydroureteronephrosis with ectopic insertion. Small left ureteral calculus in the anteriorly inserting dilated left ureter. Bladder wall thickening which is partially due to nondistention, there is   however inflammatory stranding adjacent to the bladder suggesting presence of   cystitis. Pelvic phleboliths. Stented degenerative change in lumbar spine with L4-5, L5-S1 canal and foraminal   stenoses present. Patient Active Problem List   Diagnosis Code    Sepsis due to urinary tract infection ([address] A41.9, N39.0     Past Medical History:   Diagnosis Date    Diabetes ([address]     Hypertension     Kidney infection 06/2018      History reviewed. No pertinent family history.    Social History     Tobacco Use    Smoking status: Former Smoker    Smokeless tobacco: Never Used   Substance Use Topics    Alcohol use: Yes     Alcohol/week: 1.8 oz     Types: 3 Shots of liquor per week     Comment: monthly     Past Surgical History:   Procedure Laterality Date    HX UROLOGICAL      kidney       Prior to Admission medications    Medication Sig Start Date End Date Taking? Authorizing Provider   propranolol (INDERAL) 40 mg tablet Take 40 mg by mouth two (2) times a day. Yes Other, MD Ace     Allergies   Allergen Reactions    Dilaudid [Hydromorphone] Anaphylaxis    Keflex [Cephalexin] Rash    Pcn [Penicillins] Swelling    Shellfish Derived Swelling        Review of Systems:  A comprehensive review of systems was negative except for that written in the History of Present Illness. Objective:   Blood pressure 96/60, pulse (!) 58, temperature 97.9 °F (36.6 °C), resp. rate 16, SpO2 100 %, not currently breastfeeding.   Temp (24hrs), Av.1 °F (36.7 °C), Min:97.9 °F (36.6 °C), Max:98.2 °F (36.8 °C)    Current Facility-Administered Medications   Medication Dose Route Frequency    dextrose 5% - 0.45% NaCl with KCl 20 mEq/L infusion  100 mL/hr IntraVENous CONTINUOUS    ondansetron (ZOFRAN) injection 4 mg  4 mg IntraVENous Q4H PRN    docusate sodium (COLACE) capsule 100 mg  100 mg Oral BID    oxybutynin (DITROPAN) tablet 5 mg  5 mg Oral TID PRN    oxyCODONE-acetaminophen (PERCOCET 7.5) 7.5-325 mg per tablet 1 Tab  1 Tab Oral Q4H PRN    zolpidem (AMBIEN) tablet 5 mg  5 mg Oral QHS PRN    cloNIDine HCl (CATAPRES) tablet 0.2 mg  0.2 mg Oral PRN    fluconazole (DIFLUCAN) 200mg/100 mL IVPB (premix)  200 mg IntraVENous Q24H    lactated ringers bolus infusion 1,000 mL  1,000 mL IntraVENous ONCE    diphenhydrAMINE (BENADRYL) injection 50 mg  50 mg IntraVENous ONCE PRN    levoFLOXacin (LEVAQUIN) 750 mg in D5W IVPB  750 mg IntraVENous Q24H        Exam:    General:  Awake, cooperative,    Eyes: Sclera anicteric. Pupils equally round and reactive to light. Mouth/Throat: Mucous membranes normal, oral pharynx clear   Neck: Supple   Lungs:   Clear to auscultation bilaterally   CV:  Regular rate and rhythm,no murmur, click, rub or gallop   Abdomen:   Soft,  bowel sounds normal. non-distended, supra pubic tenderness +, left flank tender   Extremities: No  edema   Lymph nodes: Cervical and supraclavicular normal   Lines/Devices:  Intact, no erythema, drainage or tenderness     Data Review: CBC: No results for input(s): WBC, RBC, HGB, HCT, PLT, GRANS, LYMPH, EOS, HGBEXT, HCTEXT, PLTEXT in the last 72 hours. CMP: No results for input(s): GLU, NA, K, CL, CO2, BUN, CREA, CA, AGAP, BUCR, TBIL, GPT, AP, TP, ALB, GLOB, AGRAT in the last 72 hours. Lab Results   Component Value Date/Time    Culture result: MIXED UROGENITAL KIRK ISOLATED 06/10/2018 01:39 PM    Culture result: ENTEROCOCCUS FAECALIS GROUP D 08/23/2016 08:14 AM          XR Results (most recent):  Results from Hospital Encounter encounter on 10/18/18   XR CHEST PORT    Narrative INDICATION:  Pre-op, if not done this hospitalization     Exam: Portable chest 1821. Comparison: None. Findings: Cardiomediastinal silhouette is within normal limits. Pulmonary  vasculature is not engorged. There are no focal parenchymal opacities,  effusions, or pneumothorax. Impression Impression:  1. No acute disease          Antibiotic History  Out patient Cipro/ Fluconazole    I have discussed the diagnosis with the patient and the intended plan as seen in the above orders. I have discussed medication side effects and warnings with the patient as well.     Reviewed test results at length with patient    Signed By: Gregg Pinto MD FACP    October 18, 2018

## 2018-10-19 NOTE — PROGRESS NOTES
Dr. Watkins Reveal aware ok K at 6.2, all meds given except IV calcium due to delay from receiving from pharmacy and now patient in IR, ok to give when patient returns. Recheck K at 2pm and call with results. Reordered kaexylate

## 2018-10-20 LAB
ALBUMIN SERPL-MCNC: 2.7 G/DL (ref 3.5–5)
ANION GAP SERPL CALC-SCNC: 8 MMOL/L (ref 5–15)
BASOPHILS # BLD: 0 K/UL (ref 0–0.1)
BASOPHILS NFR BLD: 0 % (ref 0–1)
BNP SERPL-MCNC: 635 PG/ML (ref 0–125)
BUN SERPL-MCNC: 38 MG/DL (ref 6–20)
BUN/CREAT SERPL: 21 (ref 12–20)
CALCIUM SERPL-MCNC: 8.5 MG/DL (ref 8.5–10.1)
CHLORIDE SERPL-SCNC: 94 MMOL/L (ref 97–108)
CO2 SERPL-SCNC: 23 MMOL/L (ref 21–32)
CREAT SERPL-MCNC: 1.85 MG/DL (ref 0.55–1.02)
DIFFERENTIAL METHOD BLD: ABNORMAL
EOSINOPHIL # BLD: 0 K/UL (ref 0–0.4)
EOSINOPHIL NFR BLD: 0 % (ref 0–7)
ERYTHROCYTE [DISTWIDTH] IN BLOOD BY AUTOMATED COUNT: 12.4 % (ref 11.5–14.5)
GLUCOSE BLD STRIP.AUTO-MCNC: 169 MG/DL (ref 65–100)
GLUCOSE BLD STRIP.AUTO-MCNC: 183 MG/DL (ref 65–100)
GLUCOSE BLD STRIP.AUTO-MCNC: 214 MG/DL (ref 65–100)
GLUCOSE BLD STRIP.AUTO-MCNC: 214 MG/DL (ref 65–100)
GLUCOSE BLD STRIP.AUTO-MCNC: 310 MG/DL (ref 65–100)
GLUCOSE SERPL-MCNC: 194 MG/DL (ref 65–100)
HCT VFR BLD AUTO: 27.9 % (ref 35–47)
HGB BLD-MCNC: 9 G/DL (ref 11.5–16)
IMM GRANULOCYTES # BLD: 0.2 K/UL (ref 0–0.04)
IMM GRANULOCYTES NFR BLD AUTO: 1 % (ref 0–0.5)
LACTATE SERPL-SCNC: 2.6 MMOL/L (ref 0.4–2)
LYMPHOCYTES # BLD: 0.2 K/UL (ref 0.8–3.5)
LYMPHOCYTES NFR BLD: 1 % (ref 12–49)
MCH RBC QN AUTO: 29 PG (ref 26–34)
MCHC RBC AUTO-ENTMCNC: 32.3 G/DL (ref 30–36.5)
MCV RBC AUTO: 90 FL (ref 80–99)
MONOCYTES # BLD: 0.3 K/UL (ref 0–1)
MONOCYTES NFR BLD: 2 % (ref 5–13)
NEUTS SEG # BLD: 16.5 K/UL (ref 1.8–8)
NEUTS SEG NFR BLD: 96 % (ref 32–75)
NRBC # BLD: 0 K/UL (ref 0–0.01)
NRBC BLD-RTO: 0 PER 100 WBC
PHOSPHATE SERPL-MCNC: 4.5 MG/DL (ref 2.6–4.7)
PLATELET # BLD AUTO: 397 K/UL (ref 150–400)
PMV BLD AUTO: 8.9 FL (ref 8.9–12.9)
POTASSIUM SERPL-SCNC: 4.5 MMOL/L (ref 3.5–5.1)
RBC # BLD AUTO: 3.1 M/UL (ref 3.8–5.2)
RBC MORPH BLD: ABNORMAL
SERVICE CMNT-IMP: ABNORMAL
SODIUM SERPL-SCNC: 125 MMOL/L (ref 136–145)
SODIUM SERPL-SCNC: 125 MMOL/L (ref 136–145)
WBC # BLD AUTO: 17.2 K/UL (ref 3.6–11)

## 2018-10-20 PROCEDURE — 74011250636 HC RX REV CODE- 250/636: Performed by: INTERNAL MEDICINE

## 2018-10-20 PROCEDURE — 83605 ASSAY OF LACTIC ACID: CPT | Performed by: HOSPITALIST

## 2018-10-20 PROCEDURE — 65270000029 HC RM PRIVATE

## 2018-10-20 PROCEDURE — 84295 ASSAY OF SERUM SODIUM: CPT | Performed by: INTERNAL MEDICINE

## 2018-10-20 PROCEDURE — 83880 ASSAY OF NATRIURETIC PEPTIDE: CPT | Performed by: INTERNAL MEDICINE

## 2018-10-20 PROCEDURE — 85025 COMPLETE CBC W/AUTO DIFF WBC: CPT | Performed by: INTERNAL MEDICINE

## 2018-10-20 PROCEDURE — 77030027138 HC INCENT SPIROMETER -A

## 2018-10-20 PROCEDURE — 36415 COLL VENOUS BLD VENIPUNCTURE: CPT | Performed by: INTERNAL MEDICINE

## 2018-10-20 PROCEDURE — 74011250636 HC RX REV CODE- 250/636: Performed by: UROLOGY

## 2018-10-20 PROCEDURE — 74011636637 HC RX REV CODE- 636/637: Performed by: UROLOGY

## 2018-10-20 PROCEDURE — 82962 GLUCOSE BLOOD TEST: CPT

## 2018-10-20 PROCEDURE — 80069 RENAL FUNCTION PANEL: CPT | Performed by: INTERNAL MEDICINE

## 2018-10-20 PROCEDURE — 74011250637 HC RX REV CODE- 250/637: Performed by: UROLOGY

## 2018-10-20 PROCEDURE — 74011636637 HC RX REV CODE- 636/637: Performed by: INTERNAL MEDICINE

## 2018-10-20 PROCEDURE — 94760 N-INVAS EAR/PLS OXIMETRY 1: CPT

## 2018-10-20 RX ORDER — LEVOFLOXACIN 5 MG/ML
750 INJECTION, SOLUTION INTRAVENOUS
Status: DISCONTINUED | OUTPATIENT
Start: 2018-10-21 | End: 2018-10-22

## 2018-10-20 RX ORDER — INSULIN LISPRO 100 [IU]/ML
INJECTION, SOLUTION INTRAVENOUS; SUBCUTANEOUS
Status: DISCONTINUED | OUTPATIENT
Start: 2018-10-20 | End: 2018-10-22 | Stop reason: HOSPADM

## 2018-10-20 RX ORDER — SODIUM CHLORIDE 9 MG/ML
150 INJECTION, SOLUTION INTRAVENOUS CONTINUOUS
Status: DISCONTINUED | OUTPATIENT
Start: 2018-10-20 | End: 2018-10-22 | Stop reason: HOSPADM

## 2018-10-20 RX ADMIN — DOCUSATE SODIUM 100 MG: 100 CAPSULE, LIQUID FILLED ORAL at 09:25

## 2018-10-20 RX ADMIN — Medication 10 ML: at 05:14

## 2018-10-20 RX ADMIN — OXYCODONE HYDROCHLORIDE AND ACETAMINOPHEN 1 TABLET: 7.5; 325 TABLET ORAL at 16:52

## 2018-10-20 RX ADMIN — OXYCODONE HYDROCHLORIDE AND ACETAMINOPHEN 1 TABLET: 7.5; 325 TABLET ORAL at 22:35

## 2018-10-20 RX ADMIN — Medication 10 ML: at 13:18

## 2018-10-20 RX ADMIN — Medication 10 ML: at 22:35

## 2018-10-20 RX ADMIN — FLUCONAZOLE 200 MG: 2 INJECTION, SOLUTION INTRAVENOUS at 13:18

## 2018-10-20 RX ADMIN — INSULIN LISPRO 2 UNITS: 100 INJECTION, SOLUTION INTRAVENOUS; SUBCUTANEOUS at 06:00

## 2018-10-20 RX ADMIN — SODIUM CHLORIDE 150 ML/HR: 900 INJECTION, SOLUTION INTRAVENOUS at 20:28

## 2018-10-20 RX ADMIN — OXYCODONE HYDROCHLORIDE AND ACETAMINOPHEN 1 TABLET: 7.5; 325 TABLET ORAL at 09:25

## 2018-10-20 RX ADMIN — SODIUM CHLORIDE 75 ML/HR: 900 INJECTION, SOLUTION INTRAVENOUS at 08:00

## 2018-10-20 RX ADMIN — INSULIN LISPRO 4 UNITS: 100 INJECTION, SOLUTION INTRAVENOUS; SUBCUTANEOUS at 11:52

## 2018-10-20 NOTE — PROGRESS NOTES
Pharmacy Automatic Renal Dosing Protocol - Antimicrobials Indication for Antimicrobials: Fungal UTI; r/o fungal sepsis in setting of Hydronephrosis; s/p Nephrostomy catheter placement 10/19 Current Regimen of Each Antimicrobial: 
Vancomycin 1gm IV q24h x2 days; start 10/19; Day #2/2 Fluconazole 200 mg IV Q24H  (Start Date 10/18; Day # 3) Levofloxacin 500mg IV Q24H x5 days (Start 10/18, Day 3/5) Previous Antimicrobial Therapy: 
 
Vancomycin Goal Level:  
 
Vancomycin Levels Date Dose & Interval Measured (mcg/mL) Steady State (mcg/mL) Significant Cultures:  
10/18: Blood = NG x2 days (pending) 10/18: Urine = No growth (FINAL) 10/19: Blood = (pending) Radiology / Imaging results: (X-ray, CT scan or MRI):  
 
 
Labs: 
Recent Labs 10/20/18 
0242 10/19/18 2015 10/19/18 
1450  10/19/18 
0315 CREA 1.85* 1.99* 2.08*   < > 1.94* BUN 38* 43* 52*   < > 57* WBC 17.2*  --   --   --  10.6  
 < > = values in this interval not displayed. Temp (24hrs), Av.1 °F (37.3 °C), Min:97.6 °F (36.4 °C), Max:101.1 °F (38.4 °C) Paralysis, amputations, malnutrition:  
Creatinine Clearance (mL/min) or Dialysis: 34 ml/min Impression/Plan:  
- Continue Fluconazole 200mg IV q24h 
- Change Levofloxacin to 750mg IV q48h for CrCl 34 
- ID consulted; Vancomycin 1gm IV q24h x2 doses ordered yesterday (empiric tx; repeat BC ordered); Trough Level would be 10.5 if at SS 
- BMP every day Pharmacy will follow daily and adjust medications as appropriate for renal function and/or serum levels. Thank you, 
Amber Yoon, [facility] Recommended duration of therapy 
[email or web address]/Pharmacy/Clinical%20Companion/[email or web address]. docx Renal Dosing 
[email or web address]/[place]/[facility]/Pharmacy/Clinical%20Companion/Renal%[email or web address]. pdf

## 2018-10-20 NOTE — PROGRESS NOTES
General Surgery End of Shift Nursing Note Bedside shift change report given to ROGERS Rangel (oncoming nurse) by Jacek Hughes RN (offgoing nurse). Report included the following information SBAR, Kardex, Procedure Summary, Intake/Output and MAR. Shift worked:   7p-7a Summary of shift:    Nephrostomy tube output good during shift. Pt. Became febrile and tachycardic during night. MD notified. Blood cx and BNP were drawn again per orders and tylenol PRN was ordered increased temp but not given because temp came down. IV NS infusion was increased to 100 mL/hr per hospitalist order but then stopped per nephrologist due to Na+ results being 126. Percocet given for incision pain and was effective. Heart rate and Temp decreased after receiving Percocet. Lactic acid results 2.6 x2. Notified MD.  
Issues for physician to address: Blood culture results and lactic acid 2.6 x2. Number times ambulated in hallway past shift: 0 Number of times OOB to chair past shift: 3 Pain Management: 
Current medication: Percocet 7.5 Patient states pain is manageable on current pain medication: YES 
 
GI: 
 
Current diet:  DIET DIABETIC CONSISTENT CARB Regular DIET NUTRITIONAL SUPPLEMENTS No; Breakfast, Dinner; Nepro Tolerating current diet: YES Passing flatus: NO 
Last Bowel Movement: yesterday Appearance: EMMANUEL Respiratory: 
 
Incentive Spirometer at bedside: YES Patient instructed on use: YES Patient Safety: 
 
Falls Score: 2 Bed Alarm On? No 
Sitter? No 
 
Foreign Samuels

## 2018-10-20 NOTE — PROGRESS NOTES
Charge RNAnay, spoke to Dr. Lasha Frederick (urology) regarding patients elevated temperature and HR; MD would like RN to speak with Hospitalist; Dr. Beni Vidal is on call and has been paged (currently awaiting call back); Dr. Lasha Frederick gave order for 650mg PO Tylenol Q6H and order has been entered and patient will be medicated by Primary RN, Dominga He.  
 
Alejandro Mohamud RN 
10/19/18 
10:01 PM

## 2018-10-20 NOTE — PROGRESS NOTES
[employer] Location left Severity severs Modifying factors nephrostomy tune 
assocaited  Fevers OE vs afeb 
abdo soft nil acute AP nephrostomy tune Continue antibiotics Appreciate hospitalist and ID input WBC 17 creat 1.85

## 2018-10-20 NOTE — PROGRESS NOTES
Primary RN, Claudia Hilario, spoke with Dr. Theodore All regarding patients elevated temperature and HR and concern for sepsis; Orders placed for paired blood cultures, lactic acid, and to increase IVF from 75/hr to 100/hr.  
 
Sampson Jain RN 
10/19/18 
10:18 PM

## 2018-10-20 NOTE — PROGRESS NOTES
[facility] 10[facility]. Visited by Rev. Keily Cohn, [facility] service: [phone])

## 2018-10-20 NOTE — PROGRESS NOTES
Notified on call nephrologist about lactic acid 2.6 and sodium 125. Order to restart on NS infusion at 75 and repeat sodium in 6 hrs.

## 2018-10-20 NOTE — PROGRESS NOTES
[facility] SYSTM FRANCISCAN [facility] Amy Roque 89, [address] [facility], [address] [place], [address] [facility] Phone: [phone]   Fax: [phone]      Phone: [phone]   
[email or web address]. [email or web address] Progress Note Pt Name : Viktor Kwong Date of Admission : 10/18/2018 CC: Follow up for NELDA Assessment · Hyponatremia - multiple etiology , hypotonic ivf, poor po intake · Hyperkalemia due to arf and iv kcl · nelda due to left hydro,, poor po intake, napreoxen use, ? AIN due to cipro · H/o multiple UTI - bactrim and cipro use 1 month ago. Recently on diflucan · H/o renal stone · H/o left hydro · [place]. acidosis · H/o urethral stricture- s/p dilatation · Fungal UTI 
· H/o DM · H/ HTN 
 
PLAN:  
- changed IVF to NS  And increased rate to 150 cc/ hr  
- serial chemistries  
- replete lytes PRN For other plans, see orders. Interval History: 
Seen and examined Some pain around PCN site Good UOP through PCN  
UOP picking up after IVF Na stable at 125 Denies anyn /V/CP/SOB Review of Systems: A comprehensive review of systems was negative. Current Facility-Administered Medications Medication Dose Route Frequency  0.9% sodium chloride infusion  150 mL/hr IntraVENous CONTINUOUS  
 insulin lispro (HUMALOG) injection   SubCUTAneous AC&HS  
 lactated ringers bolus infusion 1,000 mL  1,000 mL IntraVENous ONCE  
 [START ON 10/21/2018] levoFLOXacin (LEVAQUIN) 750 mg in D5W IVPB  750 mg IntraVENous Q48H  
 sodium chloride (NS) flush 5-10 mL  5-10 mL IntraVENous Q8H  
 sodium chloride (NS) flush 5-10 mL  5-10 mL IntraVENous PRN  
 glucose chewable tablet 16 g  4 Tab Oral PRN  
 dextrose (D50W) injection syrg 12.5-25 g  12.5-25 g IntraVENous PRN  
 glucagon (GLUCAGEN) injection 1 mg  1 mg IntraMUSCular PRN  
  acetaminophen (TYLENOL) tablet 650 mg  650 mg Oral Q6H PRN  
 vancomycin (VANCOCIN) 1,000 mg in 0.9% sodium chloride (MBP/ADV) 250 mL  1,000 mg IntraVENous Q24H  
 ondansetron (ZOFRAN) injection 4 mg  4 mg IntraVENous Q4H PRN  
 docusate sodium (COLACE) capsule 100 mg  100 mg Oral BID  
 oxybutynin (DITROPAN) tablet 5 mg  5 mg Oral TID PRN  
 oxyCODONE-acetaminophen (PERCOCET 7.5) 7.5-325 mg per tablet 1 Tab  1 Tab Oral Q4H PRN  
 zolpidem (AMBIEN) tablet 5 mg  5 mg Oral QHS PRN  
 cloNIDine HCl (CATAPRES) tablet 0.2 mg  0.2 mg Oral PRN  
 fluconazole (DIFLUCAN) 200mg/100 mL IVPB (premix)  200 mg IntraVENous Q24H Allergies Allergen Reactions  Dilaudid [Hydromorphone] Anaphylaxis  Keflex [Cephalexin] Rash  Pcn [Penicillins] Swelling  Shellfish Derived Swelling Objective: 
Vitals:   
Vitals:  
 10/20/18 0236 10/20/18 0820 10/20/18 1118 10/20/18 1511 BP: 111/76 103/64 107/58 104/64 Pulse: 97 (!) 109 92 92 Resp: 14 16 16 16 Temp: 98.2 °F (36.8 °C) 99.1 °F (37.3 °C) 98.4 °F (36.9 °C) 98.2 °F (36.8 °C) SpO2: 98% 98% 96% 99% Weight:      
Height:      
 
Intake and Output: 
10/20 0701 - 10/20 1900 In: 240 [P.O.:240] Out: 1380 [[id number]] 10/18 1901 - 10/20 0700 In: 856.7 [I.V.:856.7] Out: Kristopher Allis [[id number]] Physical Examination: 
General: obese Neck:lines Resp: CTA  
CV: RRR, no murmur GI: Left PCN, NT Neurologic:  Non focal 
Psych:             AAO x 3 appropriate affect  : rod + 
 
[]    High complexity decision making was performed 
[]    Patient is at high-risk of decompensation with multiple organ involvement Lab Data Personally Reviewed: I have reviewed all the pertinent labs, microbiology data and radiology studies during assessment. Recent Labs 10/20/18 
1232 10/20/18 
0242 10/19/18 2015 10/19/18 
1450 * 125* 126* 130* K  --  4.5 4.4 4.8  
CL  --  94* 94* 99  
CO2  --  23 20* 22 GLU  --  194* 254* 234* BUN  --  38* 43* 52*  
 CREA  --  1.85* 1.99* 2.08* CA  --  8.5 9.0 9.3 PHOS  --  4.5  --   --   
ALB  --  2.7*  --   --   
 
Recent Labs 10/20/18 
0242 10/19/18 
0315 WBC 17.2* 10.6 HGB 9.0* 9.6* HCT 27.9* 28.5*  
 481* No results found for: SDES Lab Results Component Value Date/Time Culture result: NO GROWTH AFTER 10 HOURS 10/19/2018 10:32 PM  
 Culture result: NO GROWTH 1 DAY 10/18/2018 03:12 PM  
 Culture result: NO GROWTH 2 DAYS 10/18/2018 01:28 PM  
 
Recent Results (from the past 24 hour(s)) METABOLIC PANEL, BASIC Collection Time: 10/19/18  8:15 PM  
Result Value Ref Range Sodium 126 (L) 136 - 145 mmol/L Potassium 4.4 3.5 - 5.1 mmol/L Chloride 94 (L) 97 - 108 mmol/L  
 CO2 20 (L) 21 - 32 mmol/L Anion gap 12 5 - 15 mmol/L Glucose 254 (H) 65 - 100 mg/dL BUN 43 (H) 6 - 20 MG/DL Creatinine 1.99 (H) 0.55 - 1.02 MG/DL  
 BUN/Creatinine ratio 22 (H) 12 - 20 GFR est AA 32 (L) >60 ml/min/1.73m2 GFR est non-AA 26 (L) >60 ml/min/1.73m2 Calcium 9.0 8.5 - 10.1 MG/DL  
GLUCOSE, POC Collection Time: 10/19/18  9:24 PM  
Result Value Ref Range Glucose (POC) 260 (H) 65 - 100 mg/dL Performed by [facility] Blood (PCT) CULTURE, BLOOD, PAIRED Collection Time: 10/19/18 10:32 PM  
Result Value Ref Range Special Requests: NO SPECIAL REQUESTS Culture result: NO GROWTH AFTER 10 HOURS    
LACTIC ACID Collection Time: 10/19/18 10:32 PM  
Result Value Ref Range Lactic acid 2.6 (HH) 0.4 - 2.0 MMOL/L  
RENAL FUNCTION PANEL Collection Time: 10/20/18  2:42 AM  
Result Value Ref Range Sodium 125 (L) 136 - 145 mmol/L Potassium 4.5 3.5 - 5.1 mmol/L Chloride 94 (L) 97 - 108 mmol/L  
 CO2 23 21 - 32 mmol/L Anion gap 8 5 - 15 mmol/L Glucose 194 (H) 65 - 100 mg/dL BUN 38 (H) 6 - 20 MG/DL Creatinine 1.85 (H) 0.55 - 1.02 MG/DL  
 BUN/Creatinine ratio 21 (H) 12 - 20 GFR est AA 35 (L) >60 ml/min/1.73m2 GFR est non-AA 29 (L) >60 ml/min/1.73m2 Calcium 8.5 8.5 - 10.1 MG/DL Phosphorus 4.5 2.6 - 4.7 MG/DL Albumin 2.7 (L) 3.5 - 5.0 g/dL CBC WITH AUTOMATED DIFF Collection Time: 10/20/18  2:42 AM  
Result Value Ref Range WBC 17.2 (H) 3.6 - 11.0 K/uL  
 RBC 3.10 (L) 3.80 - 5.20 M/uL HGB 9.0 (L) 11.5 - 16.0 g/dL HCT 27.9 (L) 35.0 - 47.0 % MCV 90.0 80.0 - 99.0 FL  
 MCH 29.0 26.0 - 34.0 PG  
 MCHC 32.3 30.0 - 36.5 g/dL  
 RDW 12.4 11.5 - 14.5 % PLATELET 306 350 - 137 K/uL MPV 8.9 8.9 - 12.9 FL  
 NRBC 0.0 0  WBC ABSOLUTE NRBC 0.00 0.00 - 0.01 K/uL NEUTROPHILS 96 (H) 32 - 75 % LYMPHOCYTES 1 (L) 12 - 49 % MONOCYTES 2 (L) 5 - 13 % EOSINOPHILS 0 0 - 7 % BASOPHILS 0 0 - 1 % IMMATURE GRANULOCYTES 1 (H) 0.0 - 0.5 % ABS. NEUTROPHILS 16.5 (H) 1.8 - 8.0 K/UL  
 ABS. LYMPHOCYTES 0.2 (L) 0.8 - 3.5 K/UL  
 ABS. MONOCYTES 0.3 0.0 - 1.0 K/UL  
 ABS. EOSINOPHILS 0.0 0.0 - 0.4 K/UL  
 ABS. BASOPHILS 0.0 0.0 - 0.1 K/UL  
 ABS. IMM. GRANS. 0.2 (H) 0.00 - 0.04 K/UL  
 DF AUTOMATED    
 RBC COMMENTS NORMOCYTIC, NORMOCHROMIC    
LACTIC ACID Collection Time: 10/20/18  2:42 AM  
Result Value Ref Range Lactic acid 2.6 (HH) 0.4 - 2.0 MMOL/L  
NT-PRO BNP Collection Time: 10/20/18  2:42 AM  
Result Value Ref Range NT pro- (H) 0 - 125 PG/ML  
GLUCOSE, POC Collection Time: 10/20/18  5:05 AM  
Result Value Ref Range Glucose (POC) 214 (H) 65 - 100 mg/dL Performed by Suleman Pyle GLUCOSE, POC Collection Time: 10/20/18  8:24 AM  
Result Value Ref Range Glucose (POC) 214 (H) 65 - 100 mg/dL Performed by Kojo Barrientos GLUCOSE, POC Collection Time: 10/20/18 11:33 AM  
Result Value Ref Range Glucose (POC) 310 (H) 65 - 100 mg/dL Performed by Doug Bartlett SODIUM Collection Time: 10/20/18 12:32 PM  
Result Value Ref Range Sodium 125 (L) 136 - 145 mmol/L  
GLUCOSE, POC Collection Time: 10/20/18  4:44 PM  
Result Value Ref Range Glucose (POC) 183 (H) 65 - 100 mg/dL Performed by Bharat Shields* I have reviewed the flowsheets. Chart reviewed. Pertinent Notes reviewed. Current Medications list reviewed by me. No change in PMH ,family and social history from Consult note. Emilia Enrique MD 
10/20/18

## 2018-10-20 NOTE — PROGRESS NOTES
Pt. Tachycardic and febrile. Notified hospitalist.  Increased NS infusion to 100 mL/hr, and blood cultures drawn by Kee Sierra RN per MD order.

## 2018-10-20 NOTE — PROGRESS NOTES
Second page to Dr. Andrews Has regarding elevated Lactic Acid.  
 
Avelina Gusman RN 
10/20/18 
12:03 AM

## 2018-10-20 NOTE — PROGRESS NOTES
Infectious Disease Progress Note IMPRESSION:  
· Recurrent UTI , last Urine culture + for yeast , no ID / sensitivities per Urology note,  UC not available . · BC , UC done 10/18 no growth · Left hydroureteronephrosis chronic,  now worsened · S/p left PCN placement by IR today · Fever of 101.1 tonight · H/o stent placement last week, distally migrated as seen on CT abd/ pelvis · H/o vesico ureteral reflux disease , L/ureter reimplant 12 years ago · Small left ureteral calculus , bladder wall thickening also seen on CT  
· H/o urethral dilatations, more frequent in past 4 months( ) · Diabetes mellitus x 26 years poorly controlled, last A1c 9.0 per pt, current A1c 8.7 · No regular Diabetes management ,recently placed on metformin · 30 lb weight loss since 2018 · Pt has PCN / Cephalosporin allergy with hives  
  
  
 
PLAN:  
  
· Repeat  BC,add Vancomycin empirically · Current UA unremarkable -+ for Bacteria 1+, no yeast , nitriite + , leucocyte esterase -, wbc 5-10 · Continue Levaquin / Fluconazole IV · Accuchecks /blood sugar control · D/w pt & spouse plan of care / importance of good blood sugar control again today · Please call on call ID if needed , I will be back on Monday. Subjective:  
 
Pt seen . S/p PCN placement . \" I feel tired\" Review of Systems:  A comprehensive review of systems was negative except for that written in the History of Present Illness. Objective:  
 
Blood pressure 116/68, pulse (!) 125, temperature 99.4 °F (37.4 °C), resp. rate 16, height 5' 7\" (1.702 m), weight 160 lb (72.6 kg), SpO2 94 %, not currently breastfeeding. Temp (24hrs), Av.9 °F (37.2 °C), Min:97.4 °F (36.3 °C), Max:101.1 °F (38.4 °C) Patient Vitals for the past 24 hrs: 
 Temp Pulse Resp BP SpO2  
10/19/18 2243 99.4 °F (37.4 °C)      
10/19/18 2129 (!) 101.1 °F (38.4 °C) (!) 125     
10/19/18 2047 100.1 °F (37.8 °C) (!) 114    10/19/18 1926 99.5 °F (37.5 °C) (!) 126 16 116/68 94 % 10/19/18 1211 97.6 °F (36.4 °C) 80 18 154/85 100 % 10/19/18 1025  78 20 110/79 98 % 10/19/18 1020  78 18 107/75 98 % 10/19/18 1015  80 18 108/78 98 % 10/19/18 1010  81 16 109/76 98 % 10/19/18 1005  88 16 109/76 98 % 10/19/18 1000  85 16 106/70 99 % 10/19/18 0955  87 15 106/76 100 % 10/19/18 0950  87 15 105/78 100 % 10/19/18 0945  91 20 116/76 100 % 10/19/18 0943  87 18 122/83 100 % 10/19/18 0913 98.6 °F (37 °C) 70 18 118/80 98 % 10/19/18 0748 98.1 °F (36.7 °C) 71 16 121/79 97 % 10/19/18 0312 97.4 °F (36.3 °C) 67 16 129/89 98 % 10/18/18 2359 98 °F (36.7 °C) 74 16 124/49 96 % Lines:  Peripheral IV:    
 
Physical Exam:  
General:  Awake, cooperative Lungs:   reduced auscultation bases CV:  Regular rate and rhythm,no murmur, click, rub or gallop Abdomen:   Soft, non-tender. bowel sounds +. non-distended Left PCN + , draining blood stained fluid Extremities: No  edema Lines/Devices:  Intact, no erythema, drainage or tenderness Data Review: CBC:  
Recent Labs 10/19/18 
0315 WBC 10.6 RBC 3.27* HGB 9.6* HCT 28.5* * GRANS 66 LYMPH 19 EOS 3 CMP:  
Recent Labs 10/19/18 2015 10/19/18 
1450 10/19/18 
0753 * 234* 177* * 130* 122* K 4.4 4.8 6.2*  
CL 94* 99 95* CO2 20* 22 17* BUN 43* 52* 51* CREA 1.99* 2.08* 1.79* CA 9.0 9.3 9.0 AGAP 12 9 10 BUCR 22* 25* 28* Studies:     
Lab Results Component Value Date/Time Culture result: NO GROWTH 1 DAY 10/18/2018 03:12 PM  
 Culture result: NO GROWTH AFTER 18 HOURS 10/18/2018 01:28 PM  
 Culture result: MIXED UROGENITAL KIRK ISOLATED 06/10/2018 01:39 PM  
 Culture result: ENTEROCOCCUS FAECALIS GROUP D 08/23/2016 08:14 AM  
  
 
XR Results (most recent): 
Results from Hospital Encounter encounter on 10/18/18 XR CHEST PORT Narrative INDICATION:  Pre-op, if not done this hospitalization Exam: Portable chest 1821. Comparison: None. Findings: Cardiomediastinal silhouette is within normal limits. Pulmonary 
vasculature is not engorged. There are no focal parenchymal opacities, 
effusions, or pneumothorax. Impression Impression: 1. No acute disease Patient Active Problem List  
Diagnosis Code  Sepsis due to urinary tract infection (HCC) A41.9, N39.0 I have discussed the diagnosis with the patient and the intended plan as seen in the above orders. I have discussed medication side effects and warnings with the patient as well. Reviewed test results at length with patient Anti-infectives: Fluconazole IV- 10/18 Levaquin IV- 10/18  Ivy Ocampo MD FACP

## 2018-10-20 NOTE — PROGRESS NOTES
Patient with critical Lactic Acid of 2.6; Dr. Carina Washington paged and currently awaiting call back.

## 2018-10-21 LAB
ALBUMIN SERPL-MCNC: 2.2 G/DL (ref 3.5–5)
ANION GAP SERPL CALC-SCNC: 10 MMOL/L (ref 5–15)
BASOPHILS # BLD: 0 K/UL (ref 0–0.1)
BASOPHILS NFR BLD: 0 % (ref 0–1)
BNP SERPL-MCNC: 431 PG/ML (ref 0–125)
BUN SERPL-MCNC: 24 MG/DL (ref 6–20)
BUN/CREAT SERPL: 18 (ref 12–20)
CALCIUM SERPL-MCNC: 8.2 MG/DL (ref 8.5–10.1)
CHLORIDE SERPL-SCNC: 98 MMOL/L (ref 97–108)
CO2 SERPL-SCNC: 21 MMOL/L (ref 21–32)
CREAT SERPL-MCNC: 1.32 MG/DL (ref 0.55–1.02)
DIFFERENTIAL METHOD BLD: ABNORMAL
EOSINOPHIL # BLD: 0 K/UL (ref 0–0.4)
EOSINOPHIL NFR BLD: 0 % (ref 0–7)
ERYTHROCYTE [DISTWIDTH] IN BLOOD BY AUTOMATED COUNT: 12.5 % (ref 11.5–14.5)
GLUCOSE BLD STRIP.AUTO-MCNC: 129 MG/DL (ref 65–100)
GLUCOSE BLD STRIP.AUTO-MCNC: 180 MG/DL (ref 65–100)
GLUCOSE BLD STRIP.AUTO-MCNC: 202 MG/DL (ref 65–100)
GLUCOSE SERPL-MCNC: 146 MG/DL (ref 65–100)
HCT VFR BLD AUTO: 22.7 % (ref 35–47)
HGB BLD-MCNC: 7.5 G/DL (ref 11.5–16)
IMM GRANULOCYTES # BLD: 0.1 K/UL (ref 0–0.04)
IMM GRANULOCYTES NFR BLD AUTO: 1 % (ref 0–0.5)
LYMPHOCYTES # BLD: 0.6 K/UL (ref 0.8–3.5)
LYMPHOCYTES NFR BLD: 8 % (ref 12–49)
MCH RBC QN AUTO: 29.5 PG (ref 26–34)
MCHC RBC AUTO-ENTMCNC: 33 G/DL (ref 30–36.5)
MCV RBC AUTO: 89.4 FL (ref 80–99)
MONOCYTES # BLD: 0.2 K/UL (ref 0–1)
MONOCYTES NFR BLD: 3 % (ref 5–13)
NEUTS SEG # BLD: 6.6 K/UL (ref 1.8–8)
NEUTS SEG NFR BLD: 88 % (ref 32–75)
NRBC # BLD: 0 K/UL (ref 0–0.01)
NRBC BLD-RTO: 0 PER 100 WBC
PHOSPHATE SERPL-MCNC: 2.7 MG/DL (ref 2.6–4.7)
PLATELET # BLD AUTO: 260 K/UL (ref 150–400)
PMV BLD AUTO: 9 FL (ref 8.9–12.9)
POTASSIUM SERPL-SCNC: 4.1 MMOL/L (ref 3.5–5.1)
RBC # BLD AUTO: 2.54 M/UL (ref 3.8–5.2)
SERVICE CMNT-IMP: ABNORMAL
SODIUM SERPL-SCNC: 129 MMOL/L (ref 136–145)
WBC # BLD AUTO: 7.4 K/UL (ref 3.6–11)

## 2018-10-21 PROCEDURE — 65270000029 HC RM PRIVATE

## 2018-10-21 PROCEDURE — 74011250637 HC RX REV CODE- 250/637: Performed by: UROLOGY

## 2018-10-21 PROCEDURE — 83880 ASSAY OF NATRIURETIC PEPTIDE: CPT | Performed by: INTERNAL MEDICINE

## 2018-10-21 PROCEDURE — 94760 N-INVAS EAR/PLS OXIMETRY 1: CPT

## 2018-10-21 PROCEDURE — 82962 GLUCOSE BLOOD TEST: CPT

## 2018-10-21 PROCEDURE — 80069 RENAL FUNCTION PANEL: CPT | Performed by: INTERNAL MEDICINE

## 2018-10-21 PROCEDURE — 74011250636 HC RX REV CODE- 250/636: Performed by: INTERNAL MEDICINE

## 2018-10-21 PROCEDURE — 74011250636 HC RX REV CODE- 250/636: Performed by: UROLOGY

## 2018-10-21 PROCEDURE — 85025 COMPLETE CBC W/AUTO DIFF WBC: CPT | Performed by: UROLOGY

## 2018-10-21 PROCEDURE — 36415 COLL VENOUS BLD VENIPUNCTURE: CPT | Performed by: INTERNAL MEDICINE

## 2018-10-21 PROCEDURE — 74011636637 HC RX REV CODE- 636/637: Performed by: UROLOGY

## 2018-10-21 RX ADMIN — SODIUM CHLORIDE 150 ML/HR: 900 INJECTION, SOLUTION INTRAVENOUS at 11:43

## 2018-10-21 RX ADMIN — DOCUSATE SODIUM 100 MG: 100 CAPSULE, LIQUID FILLED ORAL at 17:56

## 2018-10-21 RX ADMIN — OXYCODONE HYDROCHLORIDE AND ACETAMINOPHEN 1 TABLET: 7.5; 325 TABLET ORAL at 09:41

## 2018-10-21 RX ADMIN — INSULIN LISPRO 2 UNITS: 100 INJECTION, SOLUTION INTRAVENOUS; SUBCUTANEOUS at 12:25

## 2018-10-21 RX ADMIN — OXYCODONE HYDROCHLORIDE AND ACETAMINOPHEN 1 TABLET: 7.5; 325 TABLET ORAL at 22:38

## 2018-10-21 RX ADMIN — OXYCODONE HYDROCHLORIDE AND ACETAMINOPHEN 1 TABLET: 7.5; 325 TABLET ORAL at 17:56

## 2018-10-21 RX ADMIN — Medication 10 ML: at 05:46

## 2018-10-21 RX ADMIN — FLUCONAZOLE 200 MG: 2 INJECTION, SOLUTION INTRAVENOUS at 13:09

## 2018-10-21 RX ADMIN — SODIUM CHLORIDE 150 ML/HR: 900 INJECTION, SOLUTION INTRAVENOUS at 19:34

## 2018-10-21 RX ADMIN — Medication 10 ML: at 13:10

## 2018-10-21 RX ADMIN — SODIUM CHLORIDE 150 ML/HR: 900 INJECTION, SOLUTION INTRAVENOUS at 03:32

## 2018-10-21 RX ADMIN — LEVOFLOXACIN 750 MG: 5 INJECTION, SOLUTION INTRAVENOUS at 09:35

## 2018-10-21 RX ADMIN — DOCUSATE SODIUM 100 MG: 100 CAPSULE, LIQUID FILLED ORAL at 09:34

## 2018-10-21 RX ADMIN — ACETAMINOPHEN 650 MG: 325 TABLET ORAL at 17:56

## 2018-10-21 NOTE — PROGRESS NOTES
General Surgery End of Shift Nursing Note Bedside shift change report given to Jonathan Lechuga RN  (oncoming nurse) by Mamta Nguyễn RN (offgoing nurse). Report included the following information SBAR, Kardex, Intake/Output and MAR. Shift worked:   7p-7a Summary of shift:    Pain managed. Adequate output from nephrostomy. Issues for physician to address:   N/A Number times ambulated in hallway past shift: 0 Number of times OOB to chair past shift: 1 Pain Management: 
Current medication: Percocet Patient states pain is manageable on current pain medication: YES 
 
GI: 
 
Current diet:  DIET DIABETIC CONSISTENT CARB Regular DIET NUTRITIONAL SUPPLEMENTS No; Breakfast, Dinner; Nepro Tolerating current diet: YES Passing flatus: NO 
Last Bowel Movement: yesterday Appearance: EMMANUEL Respiratory: 
 
Incentive Spirometer at bedside: YES Patient instructed on use: YES Patient Safety: 
 
Falls Score: 2 Bed Alarm On? No 
Sitter? No 
 
Muna Few

## 2018-10-21 NOTE — PROGRESS NOTES
General Surgery End of Shift Nursing Note Bedside shift change report given to Ej Bell (oncoming nurse) by Kaylee Alvarado RN (offgoing nurse). Report included the following information SBAR. Shift worked:   7am-7pm  
Summary of shift:      
Issues for physician to address:     
 
Number times ambulated in hallway past shift: 2 Number of times OOB to chair past shift: 2 Pain Management: 
Current medication: Percocet Patient states pain is manageable on current pain medication: YES 
 
GI: 
 
Current diet:  DIET DIABETIC CONSISTENT CARB Regular DIET NUTRITIONAL SUPPLEMENTS No; Breakfast, Dinner; Nepro Tolerating current diet: YES Passing flatus: YES Last Bowel Movement:  
 Appearance:  
Respiratory: 
 
Incentive Spirometer at bedside: YES Patient instructed on use: YES Patient Safety: 
 
Falls Score: 2 Bed Alarm On? Not applicable Sitter? Not applicable Kaylee Alvarado

## 2018-10-21 NOTE — PROGRESS NOTES
Hospitalist Progress Note NAME: Duke Mejia :  1965 MRN:  [id number] Assessment / Plan: 
Euvolemic Hyponatremia Hyperkalemia JOSE 
-multifactorial.  1/2NS with KCl discontinued 
-received kayexalate, IV calcium, bicarb, dextrose and insulin. K wnl 
-Na improving with NS 
-nephrology consultation aprpeciated 
  
T2DM 
-on diabetic diet 
-will add SSI with q6h accuchecks Sepsis  
Recurrent UTI with L hydroureteronephrosis -IVF resuscitation, repeat Bcx 10/19 NTD 
-manage by primary team. S/p PCN on 10/19 
-abx per ID 
  
  
Code Status: full DVT Prophylaxis: per primary team  
 
  
 
Subjective: Chief Complaint / Reason for Physician Visit Pt denies complaints. Discussed with RN events overnight. Review of Systems: 
Symptom Y/N Comments  Symptom Y/N Comments Fever/Chills n   Chest Pain n   
Poor Appetite    Edema Cough    Abdominal Pain n   
Sputum    Joint Pain SOB/BRISENO n   Pruritis/Rash Nausea/vomit    Tolerating PT/OT Diarrhea    Tolerating Diet Constipation    Other Could NOT obtain due to:   
 
Objective: VITALS:  
Last 24hrs VS reviewed since prior progress note. Most recent are: 
Patient Vitals for the past 24 hrs: 
 Temp Pulse Resp BP SpO2  
10/21/18 0816 98.7 °F (37.1 °C) 96 18 106/62 95 % 10/21/18 0337 98.8 °F (37.1 °C) 96 18 120/66 93 % 10/21/18 0041 98.9 °F (37.2 °C) 99 16 105/64 91 % 10/20/18 2234 97.9 °F (36.6 °C)      
10/20/18 1952 99.2 °F (37.3 °C) 97 18 101/57 95 % 10/20/18 1511 98.2 °F (36.8 °C) 92 16 104/64 99 % 10/20/18 1118 98.4 °F (36.9 °C) 92 16 107/58 96 % Intake/Output Summary (Last 24 hours) at 10/21/2018 4686 Last data filed at 10/21/2018 8609 Gross per 24 hour Intake 490 ml Output 2375 ml Net -1885 ml PHYSICAL EXAM: 
General: WD, WN. Alert, cooperative, no acute distress   
EENT:  EOMI. Anicteric sclerae. MMM Resp: CTA bilaterally, no wheezing or rales. No accessory muscle use CV:  Regular  rhythm,  No edema GI:  Soft, Non distended, Non tender.  +Bowel sounds Neurologic:  Alert and oriented X 3, normal speech, Psych:   Good insight. Not anxious nor agitated Skin:  No rashes. No jaundice Reviewed most current lab test results and cultures  YES Reviewed most current radiology test results   YES Review and summation of old records today    NO Reviewed patient's current orders and MAR    YES 
PMH/SH reviewed - no change compared to H&P 
________________________________________________________________________ Care Plan discussed with: 
  Comments Patient x Family RN x Care Manager Consultant Multidiciplinary team rounds were held today with , nursing, pharmacist and clinical coordinator. Patient's plan of care was discussed; medications were reviewed and discharge planning was addressed. ________________________________________________________________________ Total NON critical care TIME:  35  Minutes Total CRITICAL CARE TIME Spent:   Minutes non procedure based Comments >50% of visit spent in counseling and coordination of care    
________________________________________________________________________ Wilson Osborne MD  
 
Procedures: see electronic medical records for all procedures/Xrays and details which were not copied into this note but were reviewed prior to creation of Plan. LABS: 
I reviewed today's most current labs and imaging studies. Pertinent labs include: 
Recent Labs 10/21/18 
0050 10/20/18 
0031 10/19/18 
1756 WBC 7.4 17.2* 10.6 HGB 7.5* 9.0* 9.6* HCT 22.7* 27.9* 28.5*  397 481* Recent Labs 10/21/18 
0050 10/20/18 
1232 10/20/18 
0242 10/19/18 2015 * 125* 125* 126*  
K 4.1  --  4.5 4.4 CL 98  --  94* 94* CO2 21  --  23 20* *  --  194* 254* BUN 24*  --  38* 43* CREA 1.32*  --  1.85* 1.99* CA 8.2*  --  8.5 9.0 PHOS 2.7  --  4.5  --   
ALB 2.2*  --  2.7*  --   
 
 
Signed: Miguel Alvarez MD

## 2018-10-21 NOTE — PROGRESS NOTES
[employer] Location left ureter Severity severe at this time Modifying factors nephrostomy tube placed Friday  
assocaited  no Fever no nausea no vomit 
  
OE vs afeb 
abdo soft nil acute AP continue  nephrostomy tuneb Continue antibiotics Na 129 creat 1.32

## 2018-10-21 NOTE — PROGRESS NOTES
Good UOP Cr trending down nicely to 1.3 Na upto 129 Continue NS Dr Merlinda Howard will see her again tomorrow Please call if any renal issues Edna Peters MD 
[facility] Office :[phone] Fax: [phone]

## 2018-10-22 ENCOUNTER — HOME HEALTH ADMISSION (OUTPATIENT)
Dept: HOME HEALTH SERVICES | Facility: HOME HEALTH | Age: 53
End: 2018-10-22

## 2018-10-22 ENCOUNTER — TELEPHONE (OUTPATIENT)
Dept: ENDOCRINOLOGY | Age: 53
End: 2018-10-22

## 2018-10-22 VITALS
OXYGEN SATURATION: 97 % | DIASTOLIC BLOOD PRESSURE: 61 MMHG | TEMPERATURE: 98.3 F | SYSTOLIC BLOOD PRESSURE: 107 MMHG | BODY MASS INDEX: 25.11 KG/M2 | HEART RATE: 82 BPM | RESPIRATION RATE: 18 BRPM | HEIGHT: 67 IN | WEIGHT: 160 LBS

## 2018-10-22 LAB
ALBUMIN SERPL-MCNC: 2.1 G/DL (ref 3.5–5)
ANION GAP SERPL CALC-SCNC: 7 MMOL/L (ref 5–15)
BASOPHILS # BLD: 0 K/UL (ref 0–0.1)
BASOPHILS NFR BLD: 0 % (ref 0–1)
BUN SERPL-MCNC: 13 MG/DL (ref 6–20)
BUN/CREAT SERPL: 13 (ref 12–20)
CALCIUM SERPL-MCNC: 7.8 MG/DL (ref 8.5–10.1)
CHLORIDE SERPL-SCNC: 105 MMOL/L (ref 97–108)
CO2 SERPL-SCNC: 24 MMOL/L (ref 21–32)
CREAT SERPL-MCNC: 1.01 MG/DL (ref 0.55–1.02)
DIFFERENTIAL METHOD BLD: ABNORMAL
EOSINOPHIL # BLD: 0.1 K/UL (ref 0–0.4)
EOSINOPHIL NFR BLD: 1 % (ref 0–7)
ERYTHROCYTE [DISTWIDTH] IN BLOOD BY AUTOMATED COUNT: 12.4 % (ref 11.5–14.5)
GLUCOSE BLD STRIP.AUTO-MCNC: 204 MG/DL (ref 65–100)
GLUCOSE BLD STRIP.AUTO-MCNC: 248 MG/DL (ref 65–100)
GLUCOSE SERPL-MCNC: 141 MG/DL (ref 65–100)
HCT VFR BLD AUTO: 22 % (ref 35–47)
HGB BLD-MCNC: 7 G/DL (ref 11.5–16)
IMM GRANULOCYTES # BLD: 0 K/UL (ref 0–0.04)
IMM GRANULOCYTES NFR BLD AUTO: 1 % (ref 0–0.5)
LYMPHOCYTES # BLD: 0.6 K/UL (ref 0.8–3.5)
LYMPHOCYTES NFR BLD: 12 % (ref 12–49)
MCH RBC QN AUTO: 28.9 PG (ref 26–34)
MCHC RBC AUTO-ENTMCNC: 31.8 G/DL (ref 30–36.5)
MCV RBC AUTO: 90.9 FL (ref 80–99)
MONOCYTES # BLD: 0.4 K/UL (ref 0–1)
MONOCYTES NFR BLD: 7 % (ref 5–13)
NEUTS SEG # BLD: 4.2 K/UL (ref 1.8–8)
NEUTS SEG NFR BLD: 79 % (ref 32–75)
NRBC # BLD: 0 K/UL (ref 0–0.01)
NRBC BLD-RTO: 0 PER 100 WBC
PHOSPHATE SERPL-MCNC: 2.2 MG/DL (ref 2.6–4.7)
PLATELET # BLD AUTO: 228 K/UL (ref 150–400)
PMV BLD AUTO: 9.1 FL (ref 8.9–12.9)
POTASSIUM SERPL-SCNC: 4 MMOL/L (ref 3.5–5.1)
RBC # BLD AUTO: 2.42 M/UL (ref 3.8–5.2)
SERVICE CMNT-IMP: ABNORMAL
SERVICE CMNT-IMP: ABNORMAL
SODIUM SERPL-SCNC: 136 MMOL/L (ref 136–145)
WBC # BLD AUTO: 5.3 K/UL (ref 3.6–11)

## 2018-10-22 PROCEDURE — 85025 COMPLETE CBC W/AUTO DIFF WBC: CPT | Performed by: UROLOGY

## 2018-10-22 PROCEDURE — 36415 COLL VENOUS BLD VENIPUNCTURE: CPT | Performed by: UROLOGY

## 2018-10-22 PROCEDURE — 74011250637 HC RX REV CODE- 250/637: Performed by: UROLOGY

## 2018-10-22 PROCEDURE — 74011636637 HC RX REV CODE- 636/637: Performed by: UROLOGY

## 2018-10-22 PROCEDURE — 82962 GLUCOSE BLOOD TEST: CPT

## 2018-10-22 PROCEDURE — 80069 RENAL FUNCTION PANEL: CPT | Performed by: INTERNAL MEDICINE

## 2018-10-22 PROCEDURE — 74011250637 HC RX REV CODE- 250/637: Performed by: INTERNAL MEDICINE

## 2018-10-22 PROCEDURE — 74011250636 HC RX REV CODE- 250/636: Performed by: INTERNAL MEDICINE

## 2018-10-22 PROCEDURE — 94760 N-INVAS EAR/PLS OXIMETRY 1: CPT

## 2018-10-22 RX ORDER — OXYCODONE AND ACETAMINOPHEN 7.5; 325 MG/1; MG/1
1 TABLET ORAL
Qty: 30 TAB | Refills: 0 | Status: SHIPPED | OUTPATIENT
Start: 2018-10-22 | End: 2019-03-19

## 2018-10-22 RX ORDER — METFORMIN HYDROCHLORIDE 500 MG/1
500 TABLET ORAL 2 TIMES DAILY WITH MEALS
COMMUNITY
End: 2018-11-01 | Stop reason: SDUPTHER

## 2018-10-22 RX ORDER — OXYBUTYNIN CHLORIDE 5 MG/1
5 TABLET ORAL 3 TIMES DAILY
Qty: 21 TAB | Refills: 2 | Status: SHIPPED | OUTPATIENT
Start: 2018-10-22 | End: 2018-10-22

## 2018-10-22 RX ORDER — LEVOFLOXACIN 5 MG/ML
750 INJECTION, SOLUTION INTRAVENOUS EVERY 24 HOURS
Status: DISCONTINUED | OUTPATIENT
Start: 2018-10-22 | End: 2018-10-22

## 2018-10-22 RX ORDER — LEVOFLOXACIN 750 MG/1
750 TABLET ORAL EVERY 24 HOURS
Qty: 7 TAB | Refills: 0 | Status: SHIPPED | OUTPATIENT
Start: 2018-10-23 | End: 2018-10-22

## 2018-10-22 RX ORDER — OXYBUTYNIN CHLORIDE 5 MG/1
5 TABLET ORAL 3 TIMES DAILY
Qty: 21 TAB | Refills: 2 | Status: SHIPPED | OUTPATIENT
Start: 2018-10-22 | End: 2019-10-18

## 2018-10-22 RX ORDER — LEVOFLOXACIN 750 MG/1
750 TABLET ORAL EVERY 24 HOURS
Qty: 7 TAB | Refills: 0 | Status: SHIPPED | OUTPATIENT
Start: 2018-10-23 | End: 2019-03-19

## 2018-10-22 RX ORDER — FLUCONAZOLE 200 MG/1
100 TABLET ORAL DAILY
Status: DISCONTINUED | OUTPATIENT
Start: 2018-10-22 | End: 2018-10-22 | Stop reason: HOSPADM

## 2018-10-22 RX ORDER — FLUCONAZOLE 100 MG/1
100 TABLET ORAL DAILY
Qty: 7 TAB | Refills: 0 | Status: SHIPPED | OUTPATIENT
Start: 2018-10-23 | End: 2018-10-30

## 2018-10-22 RX ORDER — LEVOFLOXACIN 750 MG/1
750 TABLET ORAL EVERY 24 HOURS
Status: DISCONTINUED | OUTPATIENT
Start: 2018-10-22 | End: 2018-10-22 | Stop reason: HOSPADM

## 2018-10-22 RX ADMIN — INSULIN LISPRO 2 UNITS: 100 INJECTION, SOLUTION INTRAVENOUS; SUBCUTANEOUS at 11:43

## 2018-10-22 RX ADMIN — SODIUM CHLORIDE 150 ML/HR: 900 INJECTION, SOLUTION INTRAVENOUS at 09:17

## 2018-10-22 RX ADMIN — DOCUSATE SODIUM 100 MG: 100 CAPSULE, LIQUID FILLED ORAL at 08:13

## 2018-10-22 RX ADMIN — INSULIN LISPRO 2 UNITS: 100 INJECTION, SOLUTION INTRAVENOUS; SUBCUTANEOUS at 08:13

## 2018-10-22 RX ADMIN — OXYBUTYNIN CHLORIDE 5 MG: 5 TABLET ORAL at 11:50

## 2018-10-22 RX ADMIN — LEVOFLOXACIN 750 MG: 750 TABLET, FILM COATED ORAL at 11:42

## 2018-10-22 RX ADMIN — FLUCONAZOLE 100 MG: 200 TABLET ORAL at 11:42

## 2018-10-22 RX ADMIN — SODIUM CHLORIDE 150 ML/HR: 900 INJECTION, SOLUTION INTRAVENOUS at 01:58

## 2018-10-22 RX ADMIN — OXYCODONE HYDROCHLORIDE AND ACETAMINOPHEN 1 TABLET: 7.5; 325 TABLET ORAL at 11:27

## 2018-10-22 NOTE — PROGRESS NOTES
General Surgery End of Shift Nursing Note Bedside shift change report given to Jey Delgadillo (oncoming nurse) by Toan Sanders (offgoing nurse). Report included the following information SBAR, Kardex, Intake/Output, MAR and Recent Results. Shift worked:   7p-7a Summary of shift:    SAMMI overnight. Pain well controlled. No void overnight, urine draining well from nephrostomy tube. Issues for physician to address:   Hgb trending down to 7.0 Number times ambulated in hallway past shift: 0 Number of times OOB to chair past shift: 0 Pain Management: 
Current medication: percocet Patient states pain is manageable on current pain medication: YES 
 
GI: 
Current diet:  DIET DIABETIC CONSISTENT CARB Regular DIET NUTRITIONAL SUPPLEMENTS No; Breakfast, Dinner; Nepro Tolerating current diet: YES Passing flatus: YES Last Bowel Movement: several days ago Respiratory: 
Incentive Spirometer at bedside: YES Patient instructed on use: YES Patient Safety: 
Falls Score: 2 Bed Alarm On? No 
Sitter?  No 
 
Ayana Maldonado RN

## 2018-10-22 NOTE — PROGRESS NOTES
Hospitalist Progress Note NAME: Raulito Parker :  1965 MRN:  [id number] Assessment / Plan: 
Euvolemic Hyponatremia Hyperkalemia JOSE 
-multifactorial.  1/2NS with KCl discontinued 
-received kayexalate, IV calcium, bicarb, dextrose and insulin. K wnl 
-renal fxn, Na, and K all normalized 
-nephrology consultation aprpeciated 
  
T2DM 
-on diabetic diet, A1C 8.7 
-cont' SSI. She reports taking metformin 500mg daily PTA. Can resume home metformin on discharge if cr cont' to improved. Hesitant to start her on new medications inpt as her appetite is not fully recovered.   
-she is requesting an endocrinologist outpt referral.  I have placed f/u request.  Medications can be further adjusted outpt by PCP/endocrinologist.   
 
Missy Chandler 
Recurrent UTI with L hydroureteronephrosis -IVF resuscitation, repeat Bcx 10/19 NTD 
-manage by primary team. S/p PCN on 10/19 
-abx per ID 
  
Thank you for the consult. Will sign off. Please re-consult as needed Code Status: full DVT Prophylaxis: per primary team  
 
  
 
Subjective: Chief Complaint / Reason for Physician Visit Pt denies complaints. Discussed with RN events overnight. Review of Systems: 
Symptom Y/N Comments  Symptom Y/N Comments Fever/Chills n   Chest Pain n   
Poor Appetite    Edema Cough    Abdominal Pain n   
Sputum    Joint Pain SOB/BRISENO n   Pruritis/Rash Nausea/vomit    Tolerating PT/OT Diarrhea    Tolerating Diet Constipation    Other Could NOT obtain due to:   
 
Objective: VITALS:  
Last 24hrs VS reviewed since prior progress note. Most recent are: 
Patient Vitals for the past 24 hrs: 
 Temp Pulse Resp BP SpO2  
10/22/18 0810 97.3 °F (36.3 °C) 86 16 105/69 96 % 10/22/18 0435 98.9 °F (37.2 °C) 90 16 120/69 94 % 10/21/18 2338 98.2 °F (36.8 °C) 75 16 96/54 96 % 10/21/18 2011 98.5 °F (36.9 °C) 82 16 101/50 96 % 10/21/18 1649 99.5 °F (37.5 °C) 85 18 118/66 97 % 10/21/18 1237 98.9 °F (37.2 °C) 96 18 102/55 97 % Intake/Output Summary (Last 24 hours) at 10/22/2018 9167 Last data filed at 10/22/2018 4910 Gross per 24 hour Intake 2475 ml Output 4950 ml Net -2475 ml PHYSICAL EXAM: 
General: WD, WN. Alert, cooperative, no acute distress   
EENT:  EOMI. Anicteric sclerae. MMM Resp:  CTA bilaterally, no wheezing or rales. No accessory muscle use CV:  Regular  rhythm,  No edema GI:  Soft, Non distended, Non tender.  +Bowel sounds Neurologic:  Alert and oriented X 3, normal speech, Psych:   Good insight. Not anxious nor agitated Skin:  No rashes. No jaundice Reviewed most current lab test results and cultures  YES Reviewed most current radiology test results   YES Review and summation of old records today    NO Reviewed patient's current orders and MAR    YES 
PMH/SH reviewed - no change compared to H&P 
________________________________________________________________________ Care Plan discussed with: 
  Comments Patient x Family RN x Care Manager Consultant Multidiciplinary team rounds were held today with , nursing, pharmacist and clinical coordinator. Patient's plan of care was discussed; medications were reviewed and discharge planning was addressed. ________________________________________________________________________ Total NON critical care TIME:  35  Minutes Total CRITICAL CARE TIME Spent:   Minutes non procedure based Comments >50% of visit spent in counseling and coordination of care    
________________________________________________________________________ Kendra Garcia MD  
 
Procedures: see electronic medical records for all procedures/Xrays and details which were not copied into this note but were reviewed prior to creation of Plan. LABS: 
I reviewed today's most current labs and imaging studies. Pertinent labs include: 
Recent Labs 10/22/18 7287 10/21/18 
0050 10/20/18 
3875 WBC 5.3 7.4 17.2* HGB 7.0* 7.5* 9.0*  
HCT 22.0* 22.7* 27.9*  
 260 397 Recent Labs 10/22/18 
5589 10/21/18 
0050 10/20/18 
1232 10/20/18 
7373  129* 125* 125* K 4.0 4.1  --  4.5  
 98  --  94* CO2 24 21  --  23 * 146*  --  194* BUN 13 24*  --  38* CREA 1.01 1.32*  --  1.85* CA 7.8* 8.2*  --  8.5 PHOS 2.2* 2.7  --  4.5 ALB 2.1* 2.2*  --  2.7* Signed: Vern Diaz MD

## 2018-10-22 NOTE — PROGRESS NOTES
Reason for Admission:   Pt was admited on 10/18/18 d/t a Dx:  Euvolemia Hyponatremia. Hyperkalemia. JOSE. DMT2. Sepsis Recurrent UTI  
               
RRAT Score:          3 Plan for utilizing home health:      Recommend [facility] to Home for new DM and Sepsis. Likelihood of Readmission:  LOW Transition of Care Plan:       Pt lives with life partner in one story home. Pt owns a glucometer. Pt is new DM. No experience with HH or SNF. Pt I W ADLs and drives. Pt insurance ended on 10/12/18. Pt indicated that she will now be under her BC/BS spouse policy as of 40/85/33 but does not have insurance card yet. PCP is Anirudh Elise. CM tried to make Endocriology appt with Dr. Claudia Plascencia but they would not let me until Pt can verify new insurance. CM let Pt know. She will make her own appt once she received new card. Pt voiced concerns in managing DM at home as it is a new Dx. CM explained  Hospital to Home and Pt was interested and in agreement with one time visit for Dx of DM and Sepsis. CM sent referral via CC to them for review. 3:15 PM  
BS can accept Hospital to Home. CM added to AVS. CM made PCP appt and placed on AVS. No further CM needs. Care Management Interventions PCP Verified by CM: Yes 
Palliative Care Criteria Met (RRAT>21 & CHF Dx)?: No 
Mode of Transport at Discharge: Other (see comment) Transition of Care Consult (CM Consult): Home Health [address]: Yes MyChart Signup: No 
Discharge Durable Medical Equipment: No 
Physical Therapy Consult: No 
Occupational Therapy Consult: No 
Speech Therapy Consult: No 
Current Support Network: Lives with Spouse, Own Home Confirm Follow Up Transport: Self Plan discussed with Pt/Family/Caregiver: Yes Freedom of Choice Offered: Yes Discharge Location Discharge Placement: Home with home health Janki Sloan Ext [phone]

## 2018-10-22 NOTE — PROGRESS NOTES
Pharmacy Automatic Renal Dosing Protocol - Antimicrobials Indication for Antimicrobials: Fungal UTI; r/o fungal sepsis in setting of Hydronephrosis; s/p Nephrostomy catheter placement 10/19 Current Regimen of Each Antimicrobial: 
Fluconazole 200 mg IV Q24H  (Start Date 10/18; Day # 4) Levofloxacin 500mg IV Q24H x5 days (Start 10/18, Day 4/5) Previous Antimicrobial Therapy: 
Vancomycin 1gm IV q24h x2 days; start 10/19; Day #2/2 Significant Cultures:  
10/18: Blood = NG x4 days (pending) 10/18: Urine = No growth (FINAL) 10/19: Blood = NG x3 days (pending) Radiology / Imaging results: (X-ray, CT scan or MRI):  
 
Labs: 
Recent Labs 10/22/18 
6307 10/21/18 
0050 10/20/18 
4283 CREA 1.01 1.32* 1.85* BUN 13 24* 38* WBC 5.3 7.4 17.2* Temp (24hrs), Av.6 °F (37 °C), Min:97.3 °F (36.3 °C), Max:99.5 °F (37.5 °C) Paralysis, amputations, malnutrition:  
Creatinine Clearance (mL/min) or Dialysis:  
Estimated Creatinine Clearance: 62.6 mL/min (based on SCr of 1.01 mg/dL). Estimated Creatinine Clearance (using IBW):62.6 mL/min Impression/Plan:  
Diet-diabetic Afebrile, WBC < 10k, SCr improved and almost wnl (2018 baseline 0.83) S/p L percut nephro tube placement on Friday ID consulted/say pt 10/22 and to complete 7 total day LQ + Fluconazole and cleared for discharge from ID standpoint Per ID note, changed to PO LQ x 6 more days and Fluconazole. Changed Levofloxacin from 750mg q48h to Levofloxacin 750mg PO q24h for 6 more days for complicated UTI. Adjusted Fluconazole from 200mg IV daily to 100mg PO daily x 6 more days for UTI. Pharmacy will follow daily and adjust medications as appropriate for renal function and/or serum levels.  
 
Thank you, 
Annabel Dupont, [facility]

## 2018-10-22 NOTE — PROGRESS NOTES
Patient has been up in chair all day. Given pain medication once and ditropan because she was complaining of spasms on urination. Her nephrostomy has been draining well and she is also voiding. She has not had a BM but she is passing gas. Tolerating her diet well. I have reviewed discharge instructions with the patient. The patient verbalized understanding.

## 2018-10-22 NOTE — DISCHARGE SUMMARY
Urology Discharge Summary    Patient: Dori Gilliam MRN: [id number]  SSN: [id number]    YOB: 1965  Age: 48 y.o. Sex: female                HOSPITAL COURSE:  Treatment of High K (Kayexalate/Insulin/Glucose/Ca), Low Na ((hypotonic hydratino), Left hydro with PCN. Cx remained negative. 7 more day diflucan/Levaquin. Discharge on Metformin for PCP f/[place]ico 1-2 days for further DM management, Will call to arrange Left Ureteroscopy. FOLLOWUP: Follow-up Appointments   Procedures    FOLLOW UP VISIT Appointment in: Other (Specify) Will call to arrange surgery. Asked tot followup with PCP regarding diabetes management in next 1-2 days. Will call to arrange surgery. Asked tot followup with PCP regarding diabetes management in next 1-2 days. Standing Status:   Standing     Number of Occurrences:   1     Order Specific Question:   Appointment in     Answer: Other (Specify)      MEDS: Current Discharge Medication List      START taking these medications    Details   fluconazole (DIFLUCAN) 100 mg tablet Take 1 Tab by mouth daily for 7 days. FDA advises cautious prescribing of oral fluconazole in pregnancy. Qty: 7 Tab, Refills: 0      levoFLOXacin (LEVAQUIN) 750 mg tablet Take 1 Tab by mouth every twenty-four (24) hours. Qty: 7 Tab, Refills: 0      oxybutynin (DITROPAN) 5 mg tablet Take 1 Tab by mouth three (3) times daily. Qty: 21 Tab, Refills: 2      oxyCODONE-acetaminophen (PERCOCET 7.5) 7.5-325 mg per tablet Take 1 Tab by mouth every four (4) hours as needed. Max Daily Amount: 6 Tabs. Qty: 30 Tab, Refills: 0    Associated Diagnoses: Hydronephrosis, unspecified hydronephrosis type         CONTINUE these medications which have NOT CHANGED    Details   metFORMIN (GLUCOPHAGE) 500 mg tablet Take 500 mg by mouth daily (with breakfast). propranolol (INDERAL) 40 mg tablet Take 40 mg by mouth two (2) times a day.               ADMISSION: 10/18/2018 by Wali Chacon MD to Home or Self Care  The primary encounter diagnosis was Sepsis due to urinary tract infection ([facility] [address] A diagnosis of Hydronephrosis, unspecified hydronephrosis type was also pertinent to this visit. DISCHARGE: 10/22/2018 1:37 PM to Home or Self Care   PROCEDURES: * No surgery found *    CONSULTS: IP CONSULT TO INFECTIOUS DISEASES  IP CONSULT TO NEPHROLOGY  IP CONSULT TO HOSPITALIST   RECENT LABS: Lab Results   Component Value Date/Time    WBC 5.3 10/22/2018 04:31 AM    HCT 22.0 (L) 10/22/2018 04:31 AM    PLATELET 159 47/90/8340 04:31 AM    Sodium 136 10/22/2018 04:31 AM    Potassium 4.0 10/22/2018 04:31 AM    Chloride 105 10/22/2018 04:31 AM    CO2 24 10/22/2018 04:31 AM    BUN 13 10/22/2018 04:31 AM    Creatinine 1.01 10/22/2018 04:31 AM    Glucose 141 (H) 10/22/2018 04:31 AM    Calcium 7.8 (L) 10/22/2018 04:31 AM      CALL FOR: If fever > 101 F, wound redness, discharge, foul odor, inability to urinate (or catheter not draining if in place), worsening abdominal pain, distension, or vomiting please call Call [facility], Chichi Prakash MD,at [phone].      Chichi Prakash MD 10/22/2018 1:37 PM

## 2018-10-22 NOTE — TELEPHONE ENCOUNTER
Pt called states she's been discharged from the hospital today and was given an instructions to call our office and schedule an appt with you in 2 weeks. Can you give me a date when to see this patient?

## 2018-10-22 NOTE — PROGRESS NOTES
Nephrology Progress Note Len Dickson  
 
[email or web address]. [email or web address]                  Phone - [phone] Patient: Inez Watson YOB: 1965     Admit Date: 10/18/2018 Date- 10/22/2018 CC: Follow up for arf, hyperkalemia Subjective: Interval History:  
-  k improved Na back to normal 
Cr. Improved She had left percut nephro. Tube placement on Friday No c/o sob, fever. No c/o nausea or vomiting No c/o chest pain ROS:- as above Assessment:  
· Hyponatremia na 121 on 10-19-18 - multiple etiology , hypotonic ivf, poor po intake · Hyperkalemia due to arf and iv kcl · JOSE due to left hydro,, poor po intake, napreoxen use, ? AIN due to cipro · H/o multiple UTI - bactrim and cipro use 1 month ago. Recently on diflucan · H/o renal stone · H/o left hydro · [place]. acidosis · H/o urethral stricture- s/p dilatation · Fungal UTI 
· H/o DM · H/ HTN 
 
 
· Plan:  
· Stop ivf · Avoid NSAIDS · Avoid hypotension · ABX PER ID 
· Nothing much to add · WE WILL SIGN OFF. Physical Exam:  
GEN: NAD NECK- Supple, no thyromegaly RESP: Clear b/l, no wheezing, No accessory muscle use CVS: RRR,S1,S2 SKIN: No Rash, ABDO: soft , non tender, LEFT Percut nephro tube + EXT: No Edema NEURO: non focal, normal speech Care Plan discussed with: patient Objective:  
Patient Vitals for the past 24 hrs: 
 Temp Pulse Resp BP SpO2  
10/22/18 0810 97.3 °F (36.3 °C) 86 16 105/69 96 % 10/22/18 0435 98.9 °F (37.2 °C) 90 16 120/69 94 % 10/21/18 2338 98.2 °F (36.8 °C) 75 16 96/54 96 % 10/21/18 2011 98.5 °F (36.9 °C) 82 16 101/50 96 % 10/21/18 1649 99.5 °F (37.5 °C) 85 18 118/66 97 % 10/21/18 1237 98.9 °F (37.2 °C) 96 18 102/55 97 % Last 3 Recorded Weights in this Encounter 10/19/18 1419 Weight: 72.6 kg (160 lb) 10/20 1901 - 10/22 0700 In: 9674 [P.O.:550; I.V.:1925] [id number] Chart reviewed. Pertinent Notes reviewed. Medication list  reviewed Current Facility-Administered Medications Medication  0.9% sodium chloride infusion  insulin lispro (HUMALOG) injection  levoFLOXacin (LEVAQUIN) 750 mg in D5W IVPB  sodium chloride (NS) flush 5-10 mL  sodium chloride (NS) flush 5-10 mL  glucose chewable tablet 16 g  
 dextrose (D50W) injection syrg 12.5-25 g  
 glucagon (GLUCAGEN) injection 1 mg  acetaminophen (TYLENOL) tablet 650 mg  
 ondansetron (ZOFRAN) injection 4 mg  docusate sodium (COLACE) capsule 100 mg  
 oxybutynin (DITROPAN) tablet 5 mg  oxyCODONE-acetaminophen (PERCOCET 7.5) 7.5-325 mg per tablet 1 Tab  zolpidem (AMBIEN) tablet 5 mg  cloNIDine HCl (CATAPRES) tablet 0.2 mg  
 fluconazole (DIFLUCAN) 200mg/100 mL IVPB (premix) Data Review : 
Recent Labs 10/22/18 
9366 10/21/18 
0050 10/20/18 
1232 10/20/18 
0242 10/19/18 2015 WBC 5.3 7.4  --  17.2*  --   
HGB 7.0* 7.5*  --  9.0*  --   
 260  --  397  --   
ANEU 4.2 6.6  --  16.5*  --   
 129* 125* 125* 126*  
K 4.0 4.1  --  4.5 4.4 * 146*  --  194* 254* BUN 13 24*  --  38* 43* CREA 1.01 1.32*  --  1.85* 1.99* CA 7.8* 8.2*  --  8.5 9.0 PHOS 2.2* 2.7  --  4.5  --   
 
Lab Results Component Value Date/Time Culture result: NO GROWTH 3 DAYS 10/19/2018 10:32 PM  
 Culture result: NO GROWTH 1 DAY 10/18/2018 03:12 PM  
 Culture result: NO GROWTH 4 DAYS 10/18/2018 01:28 PM  
 
No results found for: SDES No results for input(s): FE, TIBC, PSAT, FERR in the last 72 hours. No results found for: Maite Tatum MD 
[facility] 
 [email or web address] / [employer] [address] [place], [address] Phone - [phone] Fax - [phone]

## 2018-10-22 NOTE — PROGRESS NOTES
Infectious Disease Progress Note IMPRESSION:  
· Recurrent UTI , last Urine culture + for yeast , no ID / sensitivities per Urology note, . · BC , UC done 10/18 no growth · Left hydroureteronephrosis chronic, worsened as per CT report · S/p left PCN placement by IR 10/18 · Afebrile , WBC count normal 
· H/o stent placement a weekago, distally migrated as seen on CT abd/ pelvis · H/o vesico ureteral reflux disease , L/ureter reimplant 12 years ago · Small left ureteral calculus , bladder wall thickening also seen on CT  
· H/o urethral dilatations, more frequent in past 4 months( ) · Diabetes mellitus x 26 years poorly controlled, last A1c 9.0 per pt, current A1c 8.7 · No regular Diabetes management ,recently placed on metformin · 30 lb weight loss since 2018 · Pt has PCN / Cephalosporin allergy with hives  
  
  
 
PLAN:  
  
· Cleared for discharge from ID standpoint on po levaquin , Fluconazole  To complete 7 days ( until 10/25) · Current UA unremarkable -+ for Bacteria 1+, no yeast , nitriite + , leucocyte esterase -, wbc 5-10 · Pt needs aggressive management of her blood sugars , diet & medication , D/w pt once again. Subjective:  
 
Pt seen . \" I feel good\" Review of Systems:  A comprehensive review of systems was negative except for that written in the History of Present Illness. Objective:  
 
Blood pressure 105/69, pulse 86, temperature 97.3 °F (36.3 °C), resp. rate 16, height 5' 7\" (1.702 m), weight 160 lb (72.6 kg), SpO2 96 %, not currently breastfeeding. Temp (24hrs), Av.6 °F (37 °C), Min:97.3 °F (36.3 °C), Max:99.5 °F (37.5 °C) Patient Vitals for the past 24 hrs: 
 Temp Pulse Resp BP SpO2  
10/22/18 0810 97.3 °F (36.3 °C) 86 16 105/69 96 % 10/22/18 0435 98.9 °F (37.2 °C) 90 16 120/69 94 % 10/21/18 2338 98.2 °F (36.8 °C) 75 16 96/54 96 % 10/21/18 2011 98.5 °F (36.9 °C) 82 16 101/50 96 % 10/21/18 1649 99.5 °F (37.5 °C) 85 18 118/66 97 % 10/21/18 1237 98.9 °F (37.2 °C) 96 18 102/55 97 % Lines:  Peripheral IV:    
 
Physical Exam:  
General:  Awake, cooperative Lungs:   reduced auscultation bases CV:  Regular rate and rhythm,no murmur, click, rub or gallop Abdomen:   Soft, non-tender. bowel sounds +. non-distended Left PCN + , draining clear urine Extremities: No  edema Lines/Devices:  Intact, no erythema, drainage or tenderness Data Review: CBC:  
Recent Labs 10/22/18 
3136 10/21/18 
0050 10/20/18 
1969 WBC 5.3 7.4 17.2*  
RBC 2.42* 2.54* 3.10* HGB 7.0* 7.5* 9.0*  
HCT 22.0* 22.7* 27.9*  
 260 397 GRANS 79* 88* 96* LYMPH 12 8* 1* EOS 1 0 0 CMP:  
Recent Labs 10/22/18 
9397 10/21/18 
0050 10/20/18 
1232 10/20/18 
8573 * 146*  --  194*  129* 125* 125* K 4.0 4.1  --  4.5  
 98  --  94* CO2 24 21  --  23 BUN 13 24*  --  38* CREA 1.01 1.32*  --  1.85* CA 7.8* 8.2*  --  8.5 AGAP 7 10  --  8  
BUCR 13 18  --  21* ALB 2.1* 2.2*  --  2.7* Studies:     
Lab Results Component Value Date/Time Culture result: NO GROWTH 3 DAYS 10/19/2018 10:32 PM  
 Culture result: NO GROWTH 1 DAY 10/18/2018 03:12 PM  
 Culture result: NO GROWTH 4 DAYS 10/18/2018 01:28 PM  
 Culture result: MIXED UROGENITAL KIRK ISOLATED 06/10/2018 01:39 PM  
 Culture result: ENTEROCOCCUS FAECALIS GROUP D 08/23/2016 08:14 AM  
  
 
XR Results (most recent): 
Results from [place] encounter on 10/18/18 XR CHEST PORT Narrative INDICATION:  Pre-op, if not done this hospitalization Exam: Portable chest 1821. Comparison: None. Findings: Cardiomediastinal silhouette is within normal limits. Pulmonary 
vasculature is not engorged. There are no focal parenchymal opacities, 
effusions, or pneumothorax. Impression Impression: 1. No acute disease Patient Active Problem List  
Diagnosis Code  Sepsis due to urinary tract infection (HCC) A41.9, N39.0 I have discussed the diagnosis with the patient and the intended plan as seen in the above orders. I have discussed medication side effects and warnings with the patient as well. Reviewed test results at length with patient Anti-infectives: Fluconazole IV- 10/18 Levaquin IV- 10/18  Pedro Frausto MD FACP

## 2018-10-23 ENCOUNTER — HOME CARE VISIT (OUTPATIENT)
Dept: HOME HEALTH SERVICES | Facility: HOME HEALTH | Age: 53
End: 2018-10-23

## 2018-10-23 ENCOUNTER — OFFICE VISIT (OUTPATIENT)
Dept: ENDOCRINOLOGY | Age: 53
End: 2018-10-23

## 2018-10-23 VITALS
HEART RATE: 67 BPM | SYSTOLIC BLOOD PRESSURE: 95 MMHG | DIASTOLIC BLOOD PRESSURE: 61 MMHG | WEIGHT: 169.6 LBS | RESPIRATION RATE: 16 BRPM | BODY MASS INDEX: 26.62 KG/M2 | OXYGEN SATURATION: 95 % | HEIGHT: 67 IN

## 2018-10-23 RX ORDER — NITROFURANTOIN 25; 75 MG/1; MG/1
CAPSULE ORAL
Refills: 0 | COMMUNITY
Start: 2018-10-05 | End: 2018-10-23

## 2018-10-23 RX ORDER — TAMSULOSIN HYDROCHLORIDE 0.4 MG/1
CAPSULE ORAL
Refills: 0 | COMMUNITY
Start: 2018-10-11 | End: 2018-10-23

## 2018-10-23 RX ORDER — SULFAMETHOXAZOLE AND TRIMETHOPRIM 400; 80 MG/1; MG/1
TABLET ORAL
Refills: 12 | COMMUNITY
Start: 2018-08-27 | End: 2018-10-23 | Stop reason: ALTCHOICE

## 2018-10-23 RX ORDER — CALC/MAG/B COMPLEX/D3/HERB 61
TABLET ORAL
COMMUNITY
End: 2018-10-23

## 2018-10-23 RX ORDER — MIRABEGRON 50 MG/1
TABLET, FILM COATED, EXTENDED RELEASE ORAL
Refills: 11 | COMMUNITY
Start: 2018-09-05 | End: 2018-10-23

## 2018-10-23 RX ORDER — METHENAMINE, SODIUM PHOSPHATE, MONOBASIC, ANHYDROUS, PHENYL SALICYLATE, METHYLENE BLUE AND HYOSCYAMINE SULFATE 118; 40.8; 36; 10; .12 MG/1; MG/1; MG/1; MG/1; MG/1
CAPSULE ORAL
Refills: 2 | COMMUNITY
Start: 2018-10-06 | End: 2018-10-23

## 2018-10-23 RX ORDER — OXYCODONE AND ACETAMINOPHEN 5; 325 MG/1; MG/1
TABLET ORAL
Refills: 0 | COMMUNITY
Start: 2018-07-22 | End: 2018-10-23

## 2018-10-23 RX ORDER — PHENAZOPYRIDINE HYDROCHLORIDE 200 MG/1
TABLET, FILM COATED ORAL
Refills: 2 | COMMUNITY
Start: 2018-08-27 | End: 2018-10-23

## 2018-10-23 RX ORDER — KETOROLAC TROMETHAMINE 10 MG/1
TABLET, FILM COATED ORAL
Refills: 0 | COMMUNITY
Start: 2018-10-11 | End: 2018-10-23

## 2018-10-23 NOTE — TELEPHONE ENCOUNTER
This seems to be a new patient not seen before in our clinic. I can offer that the patient be scheduled for next available but also be placed on the waiting/cancelleation list as there are a number of urgent requests that I am working to get into the schedule. I am copying this message to my partners in case they may have an open slot / cancellation also for a new patient visit. Thank you for the message ! Naresh Morel.  [facility]

## 2018-10-23 NOTE — PROGRESS NOTES
Chief Complaint Patient presents with  Diabetes History of Present Illness: Josselin Fan is a 48 y.o. female who is a new patient for evaluation of diabetes. Was diagnosed with gestational diabetes about 26 years ago that was diet controlled and after delivery was told her sugar remained normal until about a year ago when Dr. Mariya Perez told her that her sugar was up during her procedure on her kidney. However, she remained on diet alone until about 2 weeks ago when her sugar was staying high due to recurrent UTIs and was started on metformin 500 mg once daily in the morning. Was just discharged from the hospital yesterday after being on Dr. Rodrigue Rodriguez service for UTI and hyponatremia and JOSE and her A1c was 8.7% during the stay and they requested a f/u visit with endocrine and I had a cancellation today so she came for the visit. She checked her sugar this morning and it was 274 after eating breakfast that consisted of 1 egg, 1 piece of toast and 1 piece of ham and coffee with sugar free creamer. Just bought a meter a week ago to start checking her sugars and most readings were in the 200s but did have a value over 300 when she had a stent placed a couple weeks ago. Most recent Hgb A1c was 8.7% in the hospital in 10/18. A typical day is as follows: 
- breakfast: 1 piece of bread with peanut butter or a nacho bar or a yogurt 
- lunch: grilled chicken sandwich with fries or burger or a wrap at [facility] sometimes with chips 
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 
- snacks: not too much, occ chips or peanuts Exercise has been limited over the past few months due to infection. No history of vascular disease. No history of retinopathy, occ neuropathy, no nephropathy. Last eye exam was 9/17. Past Medical History:  
Diagnosis Date  Diabetes ([facility] [address]  H/O urethral stricture  Hydronephrosis, left  Hypertension  Kidney infection 2018  Renal stone  Unilateral reflux nephropathy  UTI (urinary tract infection) Past Surgical History:  
Procedure Laterality Date  CYSTOSCOPY,INSERT URETERAL STENT    
 HX  SECTION    
 HX OTHER SURGICAL  dilatation of urethral stricture  HX OTHER SURGICAL  h/o left reimplant for VURD  HX UROLOGICAL    
 kidney 2006 Current Outpatient Medications Medication Sig  
 metFORMIN (GLUCOPHAGE) 500 mg tablet Take 500 mg by mouth daily (with breakfast).  fluconazole (DIFLUCAN) 100 mg tablet Take 1 Tab by mouth daily for 7 days. FDA advises cautious prescribing of oral fluconazole in pregnancy.  oxyCODONE-acetaminophen (PERCOCET 7.5) 7.5-325 mg per tablet Take 1 Tab by mouth every four (4) hours as needed. Max Daily Amount: 6 Tabs.  levoFLOXacin (LEVAQUIN) 750 mg tablet Take 1 Tab by mouth every twenty-four (24) hours.  oxybutynin (DITROPAN) 5 mg tablet Take 1 Tab by mouth three (3) times daily.  propranolol (INDERAL) 40 mg tablet Take 40 mg by mouth two (2) times a day. No current facility-administered medications for this visit. Allergies Allergen Reactions  Dilaudid [Hydromorphone] Anaphylaxis  Keflex [Cephalexin] Rash  Pcn [Penicillins] Swelling  Shellfish Derived Swelling Family History Problem Relation Age of Onset  Hypertension Mother  Hypertension Father  Other Father   
     celiac disease  Diabetes Paternal Grandmother  Diabetes Paternal Grandfather  Heart Disease Neg Hx  Stroke Neg Hx Social History Socioeconomic History  Marital status:  Spouse name: Heather Gudino  Number of children: 1  Years of education: Not on file  Highest education level: Not on file Social Needs  Financial resource strain: Not very hard  Food insecurity - worry: Patient refused  Food insecurity - inability: Patient refused  Transportation needs - medical: Patient refused  Transportation needs - non-medical: Patient refused Occupational History  Not on file Tobacco Use  Smoking status: Former Smoker Types: Cigarettes Last attempt to quit: 10/23/1988 Years since quittin.0  Smokeless tobacco: Never Used Substance and Sexual Activity  Alcohol use: Yes Frequency: Monthly or less Drinks per session: 1 or 2  
 Drug use: No  
 Sexual activity: Yes Birth control/protection: None Other Topics Concern [address] Service Not Asked  Blood Transfusions Not Asked  Caffeine Concern Not Asked  Occupational Exposure Not Asked Mckenna Karen Hazards Not Asked  Sleep Concern Not Asked  Stress Concern Not Asked  Weight Concern Not Asked  Special Diet Not Asked  Back Care Not Asked  Exercise Not Asked  Bike Helmet Not Asked  Seat Belt Not Asked  Self-Exams Not Asked Social History Narrative Lives in [facility] with . Has one daughter who is  and 2 grandchildren. 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 Likes to read and watch [employer] and also camps and hikes. Review of Systems: 
- Constitutional Symptoms: no fevers, chills, (+) 27 lb weight loss since  due to recurrent UTIs and decreased appetite - Eyes: no blurry vision or double vision - Cardiovascular: no chest pain, occ palpitations - Respiratory: no cough or shortness of breath 
- Gastrointestinal: no dysphagia, some abdominal pain - Musculoskeletal: no joint pains or weakness - Integumentary: no rashes - Neurological: occ numbness, tingling in feet, some headaches - Psychiatric: no depression or anxiety - Endocrine: no heat or cold intolerance, (+) polyuria and polydipsia Physical Examination: 
Blood pressure 95/61, pulse 67, resp.  rate 16, height 5' 7\" (1.702 m), weight 169 lb 9.6 oz (76.9 kg), SpO2 95 %. - General: pleasant, no distress, good eye contact 
- HEENT: no exopthalmos, no periorbital edema, no scleral/conjunctival injection, EOMI, no lid lag or stare 
- Neck: supple, no thyromegaly, masses, lymph nodes, or carotid bruits, no supraclavicular or dorsocervical fat pads - Cardiovascular: regular, normal rate, normal S1 and S2, no murmurs/rubs/gallops, 
- Respiratory: clear to auscultation bilaterally - Gastrointestinal: soft, nontender, nondistended, no masses, no hepatosplenomegaly - Musculoskeletal: no proximal muscle weakness in upper or lower extremities - Integumentary: no acanthosis nigricans, no abdominal striae, no rashes, no edema, no foot ulcers - Neurological: see foot exam 
- Psychiatric: normal mood and affect Diabetic foot exam:  
 
Left Foot: 
 Visual Exam: callous - mild Pulse DP: 2+ (normal) Filament test: reduced sensation Vibratory sensation: Vibratory sensation: normal  
   
Right Foot: 
 Visual Exam: callous - mild Pulse DP: 2+ (normal) Filament test: reduced sensation Vibratory sensation: Vibratory sensation: normal 
 
 
 
 
 
Data Reviewed:  
Component Latest Ref Rng & Units 10/22/2018 10/19/2018 10/19/2018 10/19/2018  
 
      4:31 AM  7:53 AM  3:15 AM  3:15 AM  
Sodium 136 - 145 mmol/L 136   121 (L) Potassium 3.5 - 5.1 mmol/L 4.0   5.8 (H) Chloride 97 - 108 mmol/L 105   94 (L)  
CO2 
    21 - 32 mmol/L 24   17 (L) Anion gap 5 - 15 mmol/L 7   10 Glucose 65 - 100 mg/dL 141 (H)   165 (H) BUN 
    6 - 20 MG/DL 13   57 (H) Creatinine 
    0.55 - 1.02 MG/DL 1.01   1.94 (H) BUN/Creatinine ratio 12 - 20   13   29 (H) GFR est AA 
    >60 ml/min/1.73m2 >60   33 (L)  
GFR est non-AA 
    >60 ml/min/1.73m2 57 (L)   27 (L) Calcium 8.5 - 10.1 MG/DL 7.8 (L)   9.0 Phosphorus 2.6 - 4.7 MG/DL 2.2 (L) Albumin 3.5 - 5.0 g/dL 2.1 (L) Hemoglobin A1c, (calculated) 4.2 - 6.3 %   8.7 (H) Est. average glucose 
    mg/dL   203 TSH 
    0.36 - 3.74 uIU/mL  2.89 Assessment/Plan: 1. Uncontrolled type 2 diabetes mellitus with diabetic neuropathy, without long-term current use of insulin [facility] her most recent Hgb A1c was 8.7% in 10/18 during her recent admission for urosepsis. She was just started on metformin prior to her admission and I will max this out first before starting other meds. She has to work to do on her diet and needs to start exercising so we thoroughly reviewed the plan below as listed in the instructions. We spent 60 minutes of face to face time together and > 50% of the time was spent in counseling on diabetes management and determining what changes to make to her regimen. - increase metformin 500 mg to 1 tab bid 
- check bs once daily at alternating times 
- foot exam done 10/18 
- due for eye exam, last 9/17 
- blood pressure at goal < 140/90 not on any meds - check Hgb A1c, cmp, lipids and microalbumin prior to next visit Patient Instructions 1) Try to follow the handout on the [place] plate method that I gave you to limit to no more than 1/4 of your plate with a starch and no more than 45 grams of starch with a meal. 
 
2) Check blood sugars once daily either fasting (goal is ) or 2 hours after a meal (goal is less than 180). Alternate one meal a day to check (example: one day check after breakfast, one day after lunch, one day after dinner). Write down what you ate if your blood sugar is more than 180 so you can know to cut back or cut out this food from your diet. 3) Increase the metformin 1 tab with breakfast and dinner. If you are having more diarrhea or belly pain or nausea, let me know as I will plan on changing to the extended release metformin.   If after 4 days (by Sunday morning) your sugars are still over 130 in the morning or over 180 after you eat, then increase to 1 tab with breakfast and 2 tabs with dinner. If after 4 more days you are still above goal, then go to 2 tabs with breakfast and with dinner. 4) Send me a message through the [employer] with what dose you end up on and when you need a refill so I can send the correct dose to the pharmacy. 5) Your Hemoglobin A1c is a 3 month marker of your diabetes control. Goal is less than 7% which means your average blood sugar is less than 150. Your Hemoglobin A1c is 8.7% which means your diabetes is under worse control than we would like. Continue to work on your diet and exercise and take all your medications as directed. 6) Try to get back into walking 10-15 minutes a day 2-3 times a week and work up to 30 minutes 5 times a week. This does not have to be done altogether but can be split up throughout the day. 7) Sign up for [employer] and download the stacey so you can communicate with me through the patient portal.  
 
8) You can try Dr. Janett Michaels or Dr. Marie Maldonado or Dr. Viola Owens or Dr. Anne López at [facility] for primary care. [phone]. They are in Medical office building IV ([facility] building) on the 3rd floor across from the ER. Follow-up Disposition: 
Return for 1/22/19 at 8:50am. 
 
Copy sent to: 
Vivi Sim MD as PCP - [facility]) Missy Hernandez MD (Urology)

## 2018-10-23 NOTE — PATIENT INSTRUCTIONS
1) Try to follow the handout on the [place] plate method that I gave you to limit to no more than 1/4 of your plate with a starch and no more than 45 grams of starch with a meal. 
 
2) Check blood sugars once daily either fasting (goal is ) or 2 hours after a meal (goal is less than 180). Alternate one meal a day to check (example: one day check after breakfast, one day after lunch, one day after dinner). Write down what you ate if your blood sugar is more than 180 so you can know to cut back or cut out this food from your diet. 3) Increase the metformin 1 tab with breakfast and dinner. If you are having more diarrhea or belly pain or nausea, let me know as I will plan on changing to the extended release metformin. If after 4 days (by Sunday morning) your sugars are still over 130 in the morning or over 180 after you eat, then increase to 1 tab with breakfast and 2 tabs with dinner. If after 4 more days you are still above goal, then go to 2 tabs with breakfast and with dinner. 4) Send me a message through the [employer] with what dose you end up on and when you need a refill so I can send the correct dose to the pharmacy. 5) Your Hemoglobin A1c is a 3 month marker of your diabetes control. Goal is less than 7% which means your average blood sugar is less than 150. Your Hemoglobin A1c is 8.7% which means your diabetes is under worse control than we would like. Continue to work on your diet and exercise and take all your medications as directed. 6) Try to get back into walking 10-15 minutes a day 2-3 times a week and work up to 30 minutes 5 times a week. This does not have to be done altogether but can be split up throughout the day. 7) Sign up for [employer] and download the stacey so you can communicate with me through the patient portal.  
 
8) You can try Dr. Hal Mary or Dr. Maurice Herrera or Dr. Jeremy Blum or Dr. Rudolph Simon at [facility] for primary care.  [phone]. They are in Medical office building IV ([facility] building) on the 3rd floor across from the ER.

## 2018-10-24 LAB
BACTERIA SPEC CULT: NORMAL
SERVICE CMNT-IMP: NORMAL

## 2018-10-25 ENCOUNTER — HOME CARE VISIT (OUTPATIENT)
Dept: SCHEDULING | Facility: HOME HEALTH | Age: 53
End: 2018-10-25

## 2018-10-25 PROCEDURE — G0299 HHS/HOSPICE OF RN EA 15 MIN: HCPCS

## 2018-10-28 LAB
BACTERIA SPEC CULT: ABNORMAL
BACTERIA SPEC CULT: ABNORMAL
SERVICE CMNT-IMP: ABNORMAL

## 2018-11-01 RX ORDER — METFORMIN HYDROCHLORIDE 500 MG/1
500 TABLET ORAL 2 TIMES DAILY WITH MEALS
Qty: 180 TAB | Refills: 3 | Status: SHIPPED | OUTPATIENT
Start: 2018-11-01 | End: 2019-01-10 | Stop reason: SINTOL

## 2018-11-06 ENCOUNTER — HOSPITAL ENCOUNTER (OUTPATIENT)
Dept: NUCLEAR MEDICINE | Age: 53
Discharge: HOME | End: 2018-11-06
Attending: UROLOGY
Payer: COMMERCIAL

## 2018-11-06 DIAGNOSIS — N13.30 HYDRONEPHROSIS: ICD-10-CM

## 2018-11-06 PROCEDURE — 78708 K FLOW/FUNCT IMAGE W/DRUG: CPT

## 2018-11-06 PROCEDURE — 74011250636 HC RX REV CODE- 250/636

## 2018-11-06 RX ORDER — FUROSEMIDE 10 MG/ML
20 INJECTION INTRAMUSCULAR; INTRAVENOUS ONCE
Status: COMPLETED | OUTPATIENT
Start: 2018-11-06 | End: 2018-11-06

## 2018-11-06 RX ORDER — FUROSEMIDE 10 MG/ML
INJECTION INTRAMUSCULAR; INTRAVENOUS
Status: COMPLETED
Start: 2018-11-06 | End: 2018-11-06

## 2018-11-06 RX ADMIN — FUROSEMIDE 20 MG: 10 INJECTION INTRAMUSCULAR; INTRAVENOUS at 12:44

## 2018-11-06 RX ADMIN — FUROSEMIDE 20 MG: 10 INJECTION, SOLUTION INTRAMUSCULAR; INTRAVENOUS at 12:44

## 2019-01-10 ENCOUNTER — TELEPHONE (OUTPATIENT)
Dept: ENDOCRINOLOGY | Age: 54
End: 2019-01-10

## 2019-01-10 RX ORDER — METFORMIN HYDROCHLORIDE 500 MG/1
500 TABLET, EXTENDED RELEASE ORAL 2 TIMES DAILY WITH MEALS
Qty: 180 TAB | Refills: 3 | Status: SHIPPED | OUTPATIENT
Start: 2019-01-10 | End: 2019-03-19

## 2019-01-10 NOTE — TELEPHONE ENCOUNTER
Regarding: RE:lab reminder  Contact: [phone]  ----- Message from [address] [place] [address] [address], Generic sent at 1/9/2019  6:47 PM EST -----    Dr. Gemma Farris,  I had to cancel my appointment with you due to me starting a new job this week. The  told me to email you and let you know that I am available either February 1st or March 19th. My doctor is not seeing patients  those days. Also, can you call me in more diabetic medicine? I have been having intestinal issues and I believe you said you can call in the extended release  which will help alleviate that problem. My sugar has been running between 105-140 which is much better. Thank you so much! Chinyere Valle  ----- Message -----  From: Tree Mcnamara MD  Sent: 12/23/2018 12:00 AM EST  To: Chinyere Valle  Subject: lab reminder  Please go to [facility] and have your labs repeated sometime in the 1-2 weeks prior to your upcoming visit with me. Try to allow at least 2 days at the minimum to ensure I get the results in time for your visit. The order is already in their system and be sure to ask to have labs drawn that are under Dr. Kristy Garcia name. If you have the actual lab order that I may have given you (check your glove compartment or other safe spot where you may keep your medical papers), please bring this to the lab just to be safe in case [employer] computer system is down. I will review the results at your follow up visit. Please fast for your labs. Don't eat anything after midnight. Be prepared to give a urine sample to test for any effect of the diabetes on your kidneys.

## 2019-03-12 LAB
ALBUMIN SERPL-MCNC: 4.3 G/DL (ref 3.5–5.5)
ALBUMIN/CREAT UR: 597.6 MG/G CREAT (ref 0–30)
ALBUMIN/GLOB SERPL: 1.6 {RATIO} (ref 1.2–2.2)
ALP SERPL-CCNC: 118 IU/L (ref 39–117)
ALT SERPL-CCNC: 57 IU/L (ref 0–32)
AST SERPL-CCNC: 40 IU/L (ref 0–40)
BILIRUB SERPL-MCNC: 0.4 MG/DL (ref 0–1.2)
BUN SERPL-MCNC: 14 MG/DL (ref 6–24)
BUN/CREAT SERPL: 13 (ref 9–23)
CALCIUM SERPL-MCNC: 9.4 MG/DL (ref 8.7–10.2)
CHLORIDE SERPL-SCNC: 104 MMOL/L (ref 96–106)
CHOLEST SERPL-MCNC: 166 MG/DL (ref 100–199)
CO2 SERPL-SCNC: 24 MMOL/L (ref 20–29)
CREAT SERPL-MCNC: 1.05 MG/DL (ref 0.57–1)
CREAT UR-MCNC: 74.3 MG/DL
EST. AVERAGE GLUCOSE BLD GHB EST-MCNC: 111 MG/DL
GLOBULIN SER CALC-MCNC: 2.7 G/DL (ref 1.5–4.5)
GLUCOSE SERPL-MCNC: 150 MG/DL (ref 65–99)
HBA1C MFR BLD: 5.5 % (ref 4.8–5.6)
HDLC SERPL-MCNC: 54 MG/DL
INTERPRETATION, 910389: NORMAL
LDLC SERPL CALC-MCNC: 88 MG/DL (ref 0–99)
Lab: NORMAL
MICROALBUMIN UR-MCNC: 444 UG/ML
POTASSIUM SERPL-SCNC: 4.3 MMOL/L (ref 3.5–5.2)
PROT SERPL-MCNC: 7 G/DL (ref 6–8.5)
SODIUM SERPL-SCNC: 142 MMOL/L (ref 134–144)
TRIGL SERPL-MCNC: 121 MG/DL (ref 0–149)
VLDLC SERPL CALC-MCNC: 24 MG/DL (ref 5–40)

## 2019-03-19 ENCOUNTER — OFFICE VISIT (OUTPATIENT)
Dept: ENDOCRINOLOGY | Age: 54
End: 2019-03-19

## 2019-03-19 VITALS
BODY MASS INDEX: 25.3 KG/M2 | SYSTOLIC BLOOD PRESSURE: 132 MMHG | DIASTOLIC BLOOD PRESSURE: 75 MMHG | HEART RATE: 57 BPM | HEIGHT: 67 IN | WEIGHT: 161.2 LBS

## 2019-03-19 DIAGNOSIS — R74.01 ELEVATED ALT MEASUREMENT: ICD-10-CM

## 2019-03-19 DIAGNOSIS — R74.8 ELEVATED ALKALINE PHOSPHATASE LEVEL: ICD-10-CM

## 2019-03-19 RX ORDER — METFORMIN HYDROCHLORIDE 500 MG/1
500 TABLET, EXTENDED RELEASE ORAL
Qty: 180 TAB | Refills: 3
Start: 2019-03-19 | End: 2019-10-18

## 2019-03-19 NOTE — PROGRESS NOTES
Chief Complaint   Patient presents with    Diabetes     pcp and pharmacy confirmed     History of Present Illness: Carline Faulkner is a 48 y.o. female here for follow up of diabetes. Weight down 8 lbs since last visit in 10/18. She e-mailed me in 1/19 that she was having more diarrhea on the IR metformin so we changed to ER metformin and this has helped but persists to some degree. She wonders whether she could have celiac but I told her let's try to cut back on the metformin first now that her A1c is normal to see if this helps first.  Checking sugars about once a week and fasting are all between . Did have one reading of 222 in 2/19 after eating a larger carb dinner but otherwise has been watching her carbs more closely. Has not had any recent UTIs that she is aware of and no NSAIDSs. Did have some wine the weekend before her labs were drawn to possibly raise her ALT. Current Outpatient Medications   Medication Sig    pyridoxine HCl, vitamin B6, (VITAMIN B-6 PO) Take  by mouth.  phenazopyridine HCl (AZO PO) Take  by mouth.  metFORMIN ER (GLUCOPHAGE XR) 500 mg tablet Take 1 Tab by mouth two (2) times daily (with meals). Replaces immediate release metformin    oxybutynin (DITROPAN) 5 mg tablet Take 1 Tab by mouth three (3) times daily.  propranolol (INDERAL) 40 mg tablet Take 40 mg by mouth two (2) times a day. No current facility-administered medications for this visit.       Allergies   Allergen Reactions    Dilaudid [Hydromorphone] Anaphylaxis    Keflex [Cephalexin] Rash    Pcn [Penicillins] Swelling    Shellfish Derived Swelling     Review of Systems:  - Eyes: no blurry vision or double vision  - Cardiovascular: no chest pain  - Respiratory: no shortness of breath  - Musculoskeletal: no myalgias  - Neurological: no numbness/tingling in extremities    Physical Examination:  Blood pressure 132/75, pulse (!) 57, height 5' 7\" (1.702 m), weight 161 lb 3.2 oz (73.1 kg).  - General: pleasant, no distress, good eye contact   - Neck: no carotid bruits  - Cardiovascular: regular, normal rate, nl s1 and s2, no m/r/g,   - Respiratory: clear bilaterally  - Integumentary: no edema,   - Psychiatric: normal mood and affect    Data Reviewed:   Component      Latest Ref Rng & Units 3/11/2019 3/11/2019 3/11/2019 3/11/2019           7:46 AM  7:46 AM  7:46 AM  7:46 AM   Glucose      65 - 99 mg/dL   150 (H)    BUN      6 - 24 mg/dL   14    Creatinine      0.57 - 1.00 mg/dL   1.05 (H)    GFR est non-AA      >59 mL/min/1.73   61    GFR est AA      >59 mL/min/1.73   70    BUN/Creatinine ratio      9 - 23   13    Sodium      134 - 144 mmol/L   142    Potassium      3.5 - 5.2 mmol/L   4.3    Chloride      96 - 106 mmol/L   104    CO2      20 - 29 mmol/L   24    Calcium      8.7 - 10.2 mg/dL   9.4    Protein, total      6.0 - 8.5 g/dL   7.0    Albumin      3.5 - 5.5 g/dL   4.3    GLOBULIN, TOTAL      1.5 - 4.5 g/dL   2.7    A-G Ratio      1.2 - 2.2   1.6    Bilirubin, total      0.0 - 1.2 mg/dL   0.4    Alk. phosphatase      39 - 117 IU/L   118 (H)    AST      0 - 40 IU/L   40    ALT (SGPT)      0 - 32 IU/L   57 (H)    Cholesterol, total      100 - 199 mg/dL  166     Triglyceride      0 - 149 mg/dL  121     HDL Cholesterol      >39 mg/dL  54     VLDL, calculated      5 - 40 mg/dL  24     LDL, calculated      0 - 99 mg/dL  88     Creatinine, urine      Not Estab. mg/dL 74.3      Microalbumin, urine      Not Estab. ug/mL 444.0      Microalbumin/Creat. Ratio      0.0 - 30.0 mg/g creat 597.6 (H)      Hemoglobin A1c, (calculated)      4.8 - 5.6 %    5.5   Estimated average glucose      mg/dL    111       Assessment/Plan:     1. Uncontrolled type 2 diabetes mellitus with diabetic neuropathy, without long-term current use of insulin ([facility] [address]): her most recent Hgb A1c was 5.5% in 3/19 down from 8.7% in 10/18 during her recent admission for urosepsis.   Given her A1c is now normal, will try to taper her off the metformin to see if she can control her diabetes with diet alone. - decrease metformin  mg to 1 tab at night only for 3 weeks and if sugars at goal below then try stopping  - check bs 3x/week at alternating times  - foot exam done 10/18  - due for eye exam, last 9/17  - blood pressure at goal < 140/90 not on any meds  - microalbumin 597 in 3/19 and may need to start lisinopril but will repeat first with next set of labs  - LDL 88 in 3/19  - check Hgb A1c, cmp, and microalbumin prior to next visit               2. Elevated alkaline phosphatase level: level was 118 in 3/19 possible due to mild D deficiency so will screen with next set of labs. - check Vitamin D 25-OH level prior to next visit  - can begin vitamin D 1000 units daily if she'd like        3. Elevated ALT measurement: level was 57 in 3/19 possibly from wine or mild fatty liver disease so will follow on metabolic panels        Patient Instructions   1) Decrease the metformin to 1 tab in the evening only and check your sugars 3 times a week over the next 3 weeks either fasting or 2 hours after a meal.  If your fasting sugars remain under 130 and your after meal readings remain under 180, then try stopping the metformin in 3 weeks. Continue to check over the next 3-4 weeks a few times a week again at alternating times. If going over these goals, then go back to just 1 tab in the evening. 2) Your creatinine (kidney test) was just barely abnormal possibly from the diarrhea from the metformin so we'll see if this improves as we decrease your dose. 3) Your urine protein was elevated and it's unclear if this is from your sugar or from your chronic urinary infections so we'll repeat with the next set of labs. 4) Your cholesterol is normal.    5) Your ALT (liver test) was slightly high possibly from wine before the lab draw or from a small amount of fat tin the liver so we'll watch this.     6) Your alkaline phosphatase (marker of bone turnover) was slightly high which sometimes can fit with vitamin D deficiency. I will check your vitamin D level with your next set of labs but if you'd like you can start taking 1000 of D3 daily to help protect your bones. Follow-up Disposition:  Return in about 6 months (around 9/19/2019).     Copy sent to:  Davian Simms MD as PCP - General (Family Practice)  Clark Pate MD (Urology)

## 2019-03-19 NOTE — PATIENT INSTRUCTIONS
1) Decrease the metformin to 1 tab in the evening only and check your sugars 3 times a week over the next 3 weeks either fasting or 2 hours after a meal.  If your fasting sugars remain under 130 and your after meal readings remain under 180, then try stopping the metformin in 3 weeks. Continue to check over the next 3-4 weeks a few times a week again at alternating times. If going over these goals, then go back to just 1 tab in the evening. 2) Your creatinine (kidney test) was just barely abnormal possibly from the diarrhea from the metformin so we'll see if this improves as we decrease your dose. 3) Your urine protein was elevated and it's unclear if this is from your sugar or from your chronic urinary infections so we'll repeat with the next set of labs. 4) Your cholesterol is normal.    5) Your ALT (liver test) was slightly high possibly from wine before the lab draw or from a small amount of fat tin the liver so we'll watch this. 6) Your alkaline phosphatase (marker of bone turnover) was slightly high which sometimes can fit with vitamin D deficiency. I will check your vitamin D level with your next set of labs but if you'd like you can start taking 1000 of D3 daily to help protect your bones.

## 2019-03-19 NOTE — LETTER
8/26/2019 8:24 AM 
 
Ms. Mario Malagon [address] [place] [id number] [address] Please go to [facility] and have your labs repeated sometime in the 1-2 weeks prior to your upcoming visit with me. Anastacio Lopez to allow at least 2 days at the minimum to ensure I get the results in time for your visit. Willy blanco is already in their system and be sure to ask to have labs drawn that are under Dr. Fareed Callahan name. Elias Romero you have the actual lab order that I may have given you (check your glove compartment or other safe spot where you may keep your medical papers), please bring this to the lab just to be safe in case [employer]'s computer system is down. Vj Kumar will review the results at your follow up visit. You won't need to fast for your labs before your next visit. Be prepared to give a urine sample to test for any effect of the diabetes on your kidneys.   
 
 
 
 
Sincerely, 
 
 
Tony Velez MD

## 2019-09-20 LAB
25(OH)D3+25(OH)D2 SERPL-MCNC: 46.5 NG/ML (ref 30–100)
ALBUMIN SERPL-MCNC: 4.5 G/DL (ref 3.5–5.5)
ALBUMIN/CREAT UR: 223 MG/G CREAT (ref 0–30)
ALBUMIN/GLOB SERPL: 1.6 {RATIO} (ref 1.2–2.2)
ALP SERPL-CCNC: 107 IU/L (ref 39–117)
ALT SERPL-CCNC: 19 IU/L (ref 0–32)
AST SERPL-CCNC: 17 IU/L (ref 0–40)
BILIRUB SERPL-MCNC: 0.4 MG/DL (ref 0–1.2)
BUN SERPL-MCNC: 21 MG/DL (ref 6–24)
BUN/CREAT SERPL: 22 (ref 9–23)
CALCIUM SERPL-MCNC: 9.6 MG/DL (ref 8.7–10.2)
CHLORIDE SERPL-SCNC: 100 MMOL/L (ref 96–106)
CO2 SERPL-SCNC: 23 MMOL/L (ref 20–29)
CREAT SERPL-MCNC: 0.97 MG/DL (ref 0.57–1)
CREAT UR-MCNC: 49.2 MG/DL
EST. AVERAGE GLUCOSE BLD GHB EST-MCNC: 134 MG/DL
GLOBULIN SER CALC-MCNC: 2.9 G/DL (ref 1.5–4.5)
GLUCOSE SERPL-MCNC: 160 MG/DL (ref 65–99)
HBA1C MFR BLD: 6.3 % (ref 4.8–5.6)
MICROALBUMIN UR-MCNC: 109.7 UG/ML
POTASSIUM SERPL-SCNC: 4.8 MMOL/L (ref 3.5–5.2)
PROT SERPL-MCNC: 7.4 G/DL (ref 6–8.5)
SODIUM SERPL-SCNC: 139 MMOL/L (ref 134–144)

## 2019-10-18 ENCOUNTER — OFFICE VISIT (OUTPATIENT)
Dept: ENDOCRINOLOGY | Age: 54
End: 2019-10-18

## 2019-10-18 VITALS
SYSTOLIC BLOOD PRESSURE: 126 MMHG | HEIGHT: 67 IN | RESPIRATION RATE: 16 BRPM | HEART RATE: 73 BPM | WEIGHT: 176.6 LBS | DIASTOLIC BLOOD PRESSURE: 73 MMHG | BODY MASS INDEX: 27.72 KG/M2 | OXYGEN SATURATION: 97 %

## 2019-10-18 DIAGNOSIS — I10 ESSENTIAL HYPERTENSION, BENIGN: ICD-10-CM

## 2019-10-18 DIAGNOSIS — R74.01 ELEVATED ALT MEASUREMENT: ICD-10-CM

## 2019-10-18 DIAGNOSIS — E11.21 TYPE 2 DIABETES WITH NEPHROPATHY (HCC): ICD-10-CM

## 2019-10-18 DIAGNOSIS — R74.8 ELEVATED ALKALINE PHOSPHATASE LEVEL: ICD-10-CM

## 2019-10-18 RX ORDER — GLUCOSAMINE SULFATE 1500 MG
POWDER IN PACKET (EA) ORAL DAILY
COMMUNITY
End: 2022-10-27

## 2019-10-18 RX ORDER — LISINOPRIL 5 MG/1
TABLET ORAL
Refills: 1 | COMMUNITY
Start: 2019-10-11 | End: 2020-12-31 | Stop reason: SDUPTHER

## 2019-10-18 RX ORDER — DOCUSATE SODIUM 100 MG/1
600 CAPSULE, LIQUID FILLED ORAL DAILY
COMMUNITY
End: 2020-12-31

## 2019-10-18 NOTE — PROGRESS NOTES
Chief Complaint   Patient presents with    Diabetes     History of Present Illness: Judy Torres is a 47 y.o. female here for follow up of diabetes. Weight up 15 lbs since last visit in 3/19. Changed to Ramón Ribera NP at [facility] in 9/19 and she stopped her propranolol and changed to lisinopril 5 mg daily and BP has been controlled on this. Started this about 2 weeks prior to her lab draw. She stopped the metformin after last visit and remained off this. Saw a GI doctor in 8/19 and was told she follow the FODMAP diet where you eat a lot of protein and not much veggies that are high in fiber that can make IBS worse. Her bowels are a little better with starting this but takes colace for the 6 per day. Was checked for celiac and this was normal.  No longer having diarrhea and can have constipation. Was told her thyroid was normal.  Has been eating more bread than last time and this could be making her weight go up and her sugars go up now that she is off metformin. Has been checking sporadically over the past few months and fasting sugars tend to be higher in the 130-170 range and then are down to the 110-130 range before dinner. Hasn't checked after dinner. Had been taking 1000 units of vitamin D daily until about a week prior to her lab draw. Has had more energy since BP med was changed. Has had slight increase in muscle pains off the vitamin D. Current Outpatient Medications   Medication Sig    lisinopril (PRINIVIL, ZESTRIL) 5 mg tablet TAKE 1 TABLET BY MOUTH EVERY DAY    docusate sodium (STOOL SOFTENER) 100 mg capsule Take 600 mg by mouth daily. No current facility-administered medications for this visit.       Allergies   Allergen Reactions    Dilaudid [Hydromorphone] Anaphylaxis    Keflex [Cephalexin] Rash    Pcn [Penicillins] Swelling    Shellfish Derived Swelling     Review of Systems:  - Eyes: no blurry vision or double vision  - Cardiovascular: no chest pain  - Respiratory: no shortness of breath  - Musculoskeletal: no myalgias  - Neurological: no numbness/tingling in extremities    Physical Examination:  Blood pressure 126/73, pulse 73, resp. rate 16, height 5' 7\" (1.702 m), weight 176 lb 9.6 oz (80.1 kg), SpO2 97 %. - General: pleasant, no distress, good eye contact   - Neck: no carotid bruits  - Cardiovascular: regular, normal rate, nl s1 and s2, no m/r/g,   - Respiratory: clear bilaterally  - Integumentary: no edema,   - Psychiatric: normal mood and affect    Diabetic foot exam:     Left Foot:   Visual Exam: callous - mild   Pulse DP: 2+ (normal)   Filament test: normal sensation    Vibratory sensation: normal      Right Foot:   Visual Exam: callous - mild   Pulse DP: 2+ (normal)   Filament test: normal sensation    Vibratory sensation: normal        Data Reviewed:   Component      Latest Ref Rng & Units 9/19/2019 9/19/2019 9/19/2019 9/19/2019           7:48 AM  7:48 AM  7:48 AM  7:48 AM   Glucose      65 - 99 mg/dL  160 (H)     BUN      6 - 24 mg/dL  21     Creatinine      0.57 - 1.00 mg/dL  0.97     GFR est non-AA      >59 mL/min/1.73  67     GFR est AA      >59 mL/min/1.73  77     BUN/Creatinine ratio      9 - 23  22     Sodium      134 - 144 mmol/L  139     Potassium      3.5 - 5.2 mmol/L  4.8     Chloride      96 - 106 mmol/L  100     CO2      20 - 29 mmol/L  23     Calcium      8.7 - 10.2 mg/dL  9.6     Protein, total      6.0 - 8.5 g/dL  7.4     Albumin      3.5 - 5.5 g/dL  4.5     GLOBULIN, TOTAL      1.5 - 4.5 g/dL  2.9     A-G Ratio      1.2 - 2.2  1.6     Bilirubin, total      0.0 - 1.2 mg/dL  0.4     Alk. phosphatase      39 - 117 IU/L  107     AST      0 - 40 IU/L  17     ALT (SGPT)      0 - 32 IU/L  19     Creatinine, urine      Not Estab. mg/dL   49.2    Microalbumin, urine      Not Estab. ug/mL   109.7    Microalbumin/Creat.  Ratio      0.0 - 30.0 mg/g creat   223.0 (H)    Hemoglobin A1c, (calculated)      4.8 - 5.6 %    6.3 (H)   Estimated average glucose      mg/dL    134   VITAMIN D, 25-HYDROXY      30.0 - 100.0 ng/mL 46.5        Assessment/Plan:     1. Uncontrolled type 2 diabetes mellitus with diabetic neuropathy, without long-term current use of insulin ([address]): her most recent Hgb A1c was 6.3% in 9/19 up from 5.5% in 3/19 down from 8.7% in 10/18 during her admission for urosepsis. Given her A1c remains normal, will continue to control her diabetes with diet alone. - diet control  - check bs 3x/week at alternating times  - foot exam done 10/19  - due for eye exam, last 9/17  - blood pressure at goal < 140/90 on lisinopril for renal protection  - microalbumin 597 in 3/19, started lisinopril 5 mg in 9/19 and down to 223 2 weeks later in 9/19   - LDL 88 in 3/19  - check Hgb A1c, cmp, lipids and microalbumin prior to next visit          2. Elevated alkaline phosphatase level: level was 118 in 3/19 possible due to mild D deficiency so had her start vitamin D 1000 Units daily and level down to 107 in 10/19 and D level normal at 46  - cont vitamin D 1000 units daily     3. Elevated ALT measurement: level was 57 in 3/19 possibly from wine or mild fatty liver disease but normal in 9/19     4. Hypertension: blood pressure at goal < 140/90  - cont lisinopril 5 mg daily      Patient Instructions   1) Although your A1c paris off the metformin, you have been more carbs so we'll hold on restarting metformin and focus on cutting back on carbs. Try not to eat more than 45 grams of carbs per meal (1 palm/fist sized serving = 30 grams; carbs are potatoes, rice, pasta, bread, fruit, corn, peas, cereal, desserts)    2) Your urine protein has improved with starting lisinopril but you had only been on this for 2 weeks so we'll repeat next time and see if this continues to come down. I think you can hold on going to the kidney doctor for now.     3) Your vitamin D was normal and your alkaline phosphatase (marker of bone turnover) was also normal so I would stay on 1000 of D3 daily. Follow-up and Dispositions    · Return in about 6 months (around 4/18/2020).                Copy sent to:  ODETTE Melgar MD (Urology)

## 2019-10-18 NOTE — PATIENT INSTRUCTIONS
1) Although your A1c paris off the metformin, you have been more carbs so we'll hold on restarting metformin and focus on cutting back on carbs. Try not to eat more than 45 grams of carbs per meal (1 palm/fist sized serving = 30 grams; carbs are potatoes, rice, pasta, bread, fruit, corn, peas, cereal, desserts)    2) Your urine protein has improved with starting lisinopril but you had only been on this for 2 weeks so we'll repeat next time and see if this continues to come down. I think you can hold on going to the kidney doctor for now. 3) Your vitamin D was normal and your alkaline phosphatase (marker of bone turnover) was also normal so I would stay on 1000 of D3 daily.

## 2020-02-24 RX ORDER — METFORMIN HYDROCHLORIDE 500 MG/1
500 TABLET, EXTENDED RELEASE ORAL
Qty: 60 TAB | Refills: 11 | Status: SHIPPED | OUTPATIENT
Start: 2020-02-24 | End: 2020-05-22

## 2020-04-30 LAB
CREATININE, EXTERNAL: 1.06
HBA1C MFR BLD HPLC: 6.5 %
LDL-C, EXTERNAL: 88

## 2020-05-22 ENCOUNTER — VIRTUAL VISIT (OUTPATIENT)
Dept: ENDOCRINOLOGY | Age: 55
End: 2020-05-22

## 2020-05-22 DIAGNOSIS — E78.2 MIXED HYPERLIPIDEMIA: ICD-10-CM

## 2020-05-22 DIAGNOSIS — R74.8 ELEVATED ALKALINE PHOSPHATASE LEVEL: ICD-10-CM

## 2020-05-22 DIAGNOSIS — I10 ESSENTIAL HYPERTENSION, BENIGN: ICD-10-CM

## 2020-05-22 DIAGNOSIS — R74.01 ELEVATED ALT MEASUREMENT: ICD-10-CM

## 2020-05-22 DIAGNOSIS — E11.21 TYPE 2 DIABETES WITH NEPHROPATHY (HCC): Primary | ICD-10-CM

## 2020-05-22 RX ORDER — METFORMIN HYDROCHLORIDE 500 MG/1
500 TABLET, EXTENDED RELEASE ORAL 2 TIMES DAILY
Qty: 180 TAB | Refills: 3 | Status: SHIPPED | OUTPATIENT
Start: 2020-05-22 | End: 2021-05-30

## 2020-05-22 NOTE — PROGRESS NOTES
Chief Complaint   Patient presents with    Diabetes       **THIS IS A VIRTUAL VISIT VIA A VIDEO SYNCHRONOUS DISCUSSION. PATIENT AGREED TO HAVE THEIR CARE DELIVERED OVER A [facility] VIDEO VISIT IN PLACE OF THEIR REGULARLY SCHEDULED OFFICE VISIT**    History of Present Illness: Natalie Gudino is a 47 y.o. female here for follow up of diabetes. Her weight was stable. Sugars are in the 140-180s fasting or 2 hours after meals. Did see a reading in the 160s fasting and up to 189 after eating. Has been tolerating 1 tab bid of metformin since 2/20 when she restarted as her sugars were in the 180-220s. Compliant with lisinopril and vitamin D. She has not been eating as well as she could due to [facility]. Current Outpatient Medications   Medication Sig    metFORMIN ER (GLUCOPHAGE XR) 500 mg tablet Take 1 Tab by mouth daily (with dinner). May increase to 1 tab twice daily if needed.  cholecalciferol (VITAMIN D3) 1,000 unit cap Take  by mouth daily.  lisinopril (PRINIVIL, ZESTRIL) 5 mg tablet TAKE 1 TABLET BY MOUTH EVERY DAY    docusate sodium (STOOL SOFTENER) 100 mg capsule Take 600 mg by mouth daily. No current facility-administered medications for this visit.       Allergies   Allergen Reactions    Dilaudid [Hydromorphone] Anaphylaxis    Keflex [Cephalexin] Rash    Pcn [Penicillins] Swelling    Shellfish Derived Swelling     Review of Systems: PER HPI    Physical Examination:  - GENERAL: NCAT, Appears well nourished   - EYES: EOMI, non-icteric, no proptosis   - Ear/Nose/Throat: NCAT, no visible inflammation or masses   - CARDIOVASCULAR: no cyanosis, no visible JVD   - RESPIRATORY: respiratory effort normal without any distress or labored breathing   - MUSCULOSKELETAL: Normal ROM of neck and upper extremities observed   - SKIN: No rash on face  - NEUROLOGIC:  No facial asymmetry (Cranial nerve 7 motor function), No gaze palsy   - PSYCHIATRIC: Normal affect, Normal insight and judgement       Data Reviewed: 4/30/20  - Hgb A1c 6.5%  - lipids: total 203 ,  HDL 47, , LDL 88  - ALT 30, AST 18  - BUN/Cr 16/1.06      Assessment/Plan:     1. Uncontrolled type 2 diabetes mellitus with diabetic neuropathy, without long-term current use of insulin ([facility] [address]): her most recent Hgb A1c was 6.5% up from 6.3% in 9/19 up from 5.5% in 3/19 down from 8.7% in 10/18 during her admission for urosepsis. Her A1c has trended up and required restarting of metformin and will continue current dose for now. - cont metformin  mg bid  - check bs 3x/week at alternating times  - foot exam done 10/19  - due for eye exam, last 9/17  - blood pressure at goal < 140/90 on lisinopril for renal protection  - microalbumin 597 in 3/19, started lisinopril 5 mg in 9/19 and down to 223 2 weeks later in 9/19   - check Hgb A1c, cmp, and microalbumin prior to next visit          2. Elevated alkaline phosphatase level: level was 118 in 3/19 possible due to mild D deficiency so had her start vitamin D 1000 Units daily and level down to 107 in 10/19 and D level normal at 46 and 109 in 5/20  - cont vitamin D 1000 units daily       3. Elevated ALT measurement: level was 57 in 3/19 possibly from wine or mild fatty liver disease but normal in 9/19 and in 5/20     4. Hypertension: blood pressure at goal < 140/90  - cont lisinopril 5 mg daily    5. Mixed Hyperlipidemia: LDL 88 in 3/19 and also in 5/20 but .  - check lipids prior to next visit        Patient Instructions   1) Your A1c is 6.5% which is up from 6.3% in 9/19 but still at goal under 7%. Continue taking metformin 1 tab twice daily. If you are able to get your fasting sugars under 110, then you can stop the morning dose and if you are still able to keep them under 110, you can try stopping metformin. 2) Your triglycerides (short term fats) were higher and HDL (good cholesterol) was lower due to higher sugars.     3) Your liver and kidney function was normal.    4) Please come for a follow up visit on 12/7/20 at 8:50am in our [place] office. 5) I will mail you a lab slip. Please put this in the glove compartment or other safe spot where you keep your medical papers and I will send you a reminder to have your labs drawn prior to next visit.         Follow-up and Dispositions    · Return for 12/7/20 at 8:50am .               Copy sent to:  ODETTE Rao MD (Urology)

## 2020-05-23 NOTE — PATIENT INSTRUCTIONS
1) Your A1c is 6.5% which is up from 6.3% in 9/19 but still at goal under 7%. Continue taking metformin 1 tab twice daily. If you are able to get your fasting sugars under 110, then you can stop the morning dose and if you are still able to keep them under 110, you can try stopping metformin. 2) Your triglycerides (short term fats) were higher and HDL (good cholesterol) was lower due to higher sugars. 3) Your liver and kidney function was normal. 
 
4) Please come for a follow up visit on 12/7/20 at 8:50am in our [place] office. 5) I will mail you a lab slip. Please put this in the glove compartment or other safe spot where you keep your medical papers and I will send you a reminder to have your labs drawn prior to next visit.

## 2020-11-21 DIAGNOSIS — R74.01 ELEVATED ALT MEASUREMENT: ICD-10-CM

## 2020-11-21 DIAGNOSIS — R74.8 ELEVATED ALKALINE PHOSPHATASE LEVEL: ICD-10-CM

## 2020-11-21 DIAGNOSIS — E11.21 TYPE 2 DIABETES WITH NEPHROPATHY (HCC): ICD-10-CM

## 2020-11-21 DIAGNOSIS — I10 ESSENTIAL HYPERTENSION, BENIGN: ICD-10-CM

## 2020-11-21 DIAGNOSIS — E78.2 MIXED HYPERLIPIDEMIA: ICD-10-CM

## 2020-12-31 ENCOUNTER — VIRTUAL VISIT (OUTPATIENT)
Dept: ENDOCRINOLOGY | Age: 55
End: 2020-12-31
Payer: COMMERCIAL

## 2020-12-31 ENCOUNTER — TELEPHONE (OUTPATIENT)
Dept: ENDOCRINOLOGY | Age: 55
End: 2020-12-31

## 2020-12-31 DIAGNOSIS — R74.01 ELEVATED ALT MEASUREMENT: ICD-10-CM

## 2020-12-31 DIAGNOSIS — E11.21 TYPE 2 DIABETES WITH NEPHROPATHY (HCC): Primary | ICD-10-CM

## 2020-12-31 DIAGNOSIS — R74.8 ELEVATED ALKALINE PHOSPHATASE LEVEL: ICD-10-CM

## 2020-12-31 DIAGNOSIS — I10 ESSENTIAL HYPERTENSION, BENIGN: ICD-10-CM

## 2020-12-31 DIAGNOSIS — E78.2 MIXED HYPERLIPIDEMIA: ICD-10-CM

## 2020-12-31 PROCEDURE — 99214 OFFICE O/P EST MOD 30 MIN: CPT | Performed by: INTERNAL MEDICINE

## 2020-12-31 RX ORDER — LISINOPRIL 10 MG/1
TABLET ORAL
Qty: 90 TAB | Refills: 3 | Status: SHIPPED | OUTPATIENT
Start: 2020-12-31 | End: 2021-07-01

## 2020-12-31 NOTE — TELEPHONE ENCOUNTER
Please obtain recent labs including cmp and lipid panel from her PCP from 12/28/20 (I just received the A1c and urine microalbumin). Thanks.

## 2020-12-31 NOTE — PROGRESS NOTES
Chief Complaint   Patient presents with    Diabetes     mychart    Other     pcp and pahrmacy confirmed       **THIS IS A VIRTUAL VISIT VIA A VIDEO SYNCHRONOUS DISCUSSION. PATIENT AGREED TO HAVE THEIR CARE DELIVERED OVER A [employer] VIDEO VISIT IN PLACE OF THEIR REGULARLY SCHEDULED OFFICE VISIT**    History of Present Illness: Main Hurley is a 54 y.o. female here for follow up of diabetes. Her BP was 140/78 at her PCP's office this week. Tested positive for COVID earlier this month and her sugars went up over 200 and I advised to increase the metformin to 2 tabs bid and she did this for about a week but was on a medrol pack and antibiotic at the same time. Just has a little bit of fatigue left over but no respiratory issues. Current Outpatient Medications   Medication Sig    metFORMIN ER (GLUCOPHAGE XR) 500 mg tablet Take 1 Tab by mouth two (2) times a day.  lisinopril (PRINIVIL, ZESTRIL) 5 mg tablet TAKE 1 TABLET BY MOUTH EVERY DAY    cholecalciferol (VITAMIN D3) 1,000 unit cap Take  by mouth daily. No current facility-administered medications for this visit.       Allergies   Allergen Reactions    Dilaudid [Hydromorphone] Anaphylaxis    Keflex [Cephalexin] Rash    Pcn [Penicillins] Swelling    Shellfish Derived Swelling     Review of Systems: PER HPI    Physical Examination:  - GENERAL: NCAT, Appears well nourished   - EYES: EOMI, non-icteric, no proptosis   - Ear/Nose/Throat: NCAT, no visible inflammation or masses   - CARDIOVASCULAR: no cyanosis, no visible JVD   - RESPIRATORY: respiratory effort normal without any distress or labored breathing   - MUSCULOSKELETAL: Normal ROM of neck and upper extremities observed   - SKIN: No rash on face  - NEUROLOGIC:  No facial asymmetry (Cranial nerve 7 motor function), No gaze palsy   - PSYCHIATRIC: Normal affect, Normal insight and judgement       Data Reviewed: 12/28/20  - Hgb A1c 6.8%  - lipids: total 261,  HDL 45, , LDL can't be calculated  - , AST 45  - BUN/Cr 18/0.97    - microalbumin 235    Assessment/Plan:     1. Uncontrolled type 2 diabetes mellitus with diabetic neuropathy, without long-term current use of insulin ([facility] [address]): her most recent Hgb A1c was 6.8% in 12/20 up from 6.5% up from 6.3% in 9/19 up from 5.5% in 3/19 down from 8.7% in 10/18 during her admission for urosepsis. Her A1c has trended up due to a steroid pack for COVID in 12/20 so will continue her current dose for now. - cont metformin  mg bid  - check bs 3x/week at alternating times  - foot exam done 10/19  - due for eye exam, last 9/17  - blood pressure at goal < 140/90 on lisinopril for renal protection  - microalbumin 597 in 3/19, started lisinopril 5 mg in 9/19 and down to 223 2 weeks later in 9/19 and up to 235 in 12/20 so increased to 10 mg daily  - check Hgb A1c, cmp, and microalbumin prior to next visit          2. Elevated alkaline phosphatase level: level was 118 in 3/19 possible due to mild D deficiency so had her start vitamin D 1000 Units daily and level down to 107 in 10/19 and D level normal at 46 and 109 in 5/20  - cont vitamin D 1000 units daily       3. Elevated ALT measurement: level was 57 in 3/19 possibly from wine or mild fatty liver disease but normal in 9/19 and in 5/20. Up to 114 in 12/20     4. Hypertension: blood pressure just at goal < 140/90 so increased lisinopril.  - increase lisinopril to 10 mg daily    5. Mixed Hyperlipidemia: LDL 88 in 3/19 and also in 5/20 but .  in 12/20 and non-  - check lipids prior to next visit      Patient Instructions   1) Microalbumin/creatinine ratio is a marker of the amount of protein in your urine. Goal is less than 30. Your value is abnormal at 235. This indicates that your kidneys are being slightly affected by your diabetes and/or blood pressure. Increase the lisinopril to 10 mg daily. This will be ready for  at the pharmacy today.   Take 2 of the 5 mg tabs once daily until these run out and then take 1 of the 10 mg tabs once daily. 2) It's likely your A1c went up from the recent COVID infection and having received a steroid pack. This can also raise your triglycerides (short term fats) when your sugars have been higher. 3) Please come for a follow up visit on 7/1/21 at 9:50am in our [place] office. 4) I put an order directly into the [employer] system to repeat your labs in the 1-2 weeks prior to your next visit so just ask for the order under my name and you will receive a courtesy reminder through [employer] to have these drawn.            Follow-up and Dispositions    · Return 7/1/21 at 9:50am.             Copy sent to:  ODETTE Kerr MD (Urology)

## 2020-12-31 NOTE — PATIENT INSTRUCTIONS
1) Microalbumin/creatinine ratio is a marker of the amount of protein in your urine. Goal is less than 30. Your value is abnormal at 235. This indicates that your kidneys are being slightly affected by your diabetes and/or blood pressure. Increase the lisinopril to 10 mg daily. This will be ready for  at the pharmacy today. Take 2 of the 5 mg tabs once daily until these run out and then take 1 of the 10 mg tabs once daily. 2) It's likely your A1c went up from the recent COVID infection and having received a steroid pack. This can also raise your triglycerides (short term fats) when your sugars have been higher. 3) Please come for a follow up visit on 7/1/21 at 9:50am in our [place] office. 4) I put an order directly into the [employer] system to repeat your labs in the 1-2 weeks prior to your next visit so just ask for the order under my name and you will receive a courtesy reminder through [employer] to have these drawn.

## 2021-05-30 RX ORDER — METFORMIN HYDROCHLORIDE 500 MG/1
TABLET, EXTENDED RELEASE ORAL
Qty: 180 TABLET | Refills: 3 | Status: SHIPPED | OUTPATIENT
Start: 2021-05-30 | End: 2021-07-01

## 2021-06-17 DIAGNOSIS — E78.2 MIXED HYPERLIPIDEMIA: ICD-10-CM

## 2021-06-17 DIAGNOSIS — R74.8 ELEVATED ALKALINE PHOSPHATASE LEVEL: ICD-10-CM

## 2021-06-17 DIAGNOSIS — E11.21 TYPE 2 DIABETES WITH NEPHROPATHY (HCC): ICD-10-CM

## 2021-06-17 DIAGNOSIS — I10 ESSENTIAL HYPERTENSION, BENIGN: ICD-10-CM

## 2021-06-17 DIAGNOSIS — R74.01 ELEVATED ALT MEASUREMENT: ICD-10-CM

## 2021-06-21 LAB
ALBUMIN SERPL-MCNC: 4.4 G/DL (ref 3.8–4.9)
ALBUMIN/CREAT UR: 210 MG/G CREAT (ref 0–29)
ALBUMIN/GLOB SERPL: 1.5 {RATIO} (ref 1.2–2.2)
ALP SERPL-CCNC: 116 IU/L (ref 48–121)
ALT SERPL-CCNC: 43 IU/L (ref 0–32)
AST SERPL-CCNC: 26 IU/L (ref 0–40)
BILIRUB SERPL-MCNC: 0.3 MG/DL (ref 0–1.2)
BUN SERPL-MCNC: 18 MG/DL (ref 6–24)
BUN/CREAT SERPL: 17 (ref 9–23)
CALCIUM SERPL-MCNC: 9.4 MG/DL (ref 8.7–10.2)
CHLORIDE SERPL-SCNC: 102 MMOL/L (ref 96–106)
CHOLEST SERPL-MCNC: 224 MG/DL (ref 100–199)
CO2 SERPL-SCNC: 23 MMOL/L (ref 20–29)
CREAT SERPL-MCNC: 1.03 MG/DL (ref 0.57–1)
CREAT UR-MCNC: 58.7 MG/DL
EST. AVERAGE GLUCOSE BLD GHB EST-MCNC: 171 MG/DL
GLOBULIN SER CALC-MCNC: 3 G/DL (ref 1.5–4.5)
GLUCOSE SERPL-MCNC: 160 MG/DL (ref 65–99)
HBA1C MFR BLD: 7.6 % (ref 4.8–5.6)
HDLC SERPL-MCNC: 34 MG/DL
IMP & REVIEW OF LAB RESULTS: NORMAL
LDLC SERPL CALC-MCNC: 99 MG/DL (ref 0–99)
MICROALBUMIN UR-MCNC: 123.1 UG/ML
POTASSIUM SERPL-SCNC: 4.9 MMOL/L (ref 3.5–5.2)
PROT SERPL-MCNC: 7.4 G/DL (ref 6–8.5)
SODIUM SERPL-SCNC: 140 MMOL/L (ref 134–144)
TRIGL SERPL-MCNC: 538 MG/DL (ref 0–149)
VLDLC SERPL CALC-MCNC: 91 MG/DL (ref 5–40)

## 2021-07-01 ENCOUNTER — VIRTUAL VISIT (OUTPATIENT)
Dept: ENDOCRINOLOGY | Age: 56
End: 2021-07-01
Payer: COMMERCIAL

## 2021-07-01 DIAGNOSIS — E11.21 TYPE 2 DIABETES WITH NEPHROPATHY (HCC): Primary | ICD-10-CM

## 2021-07-01 DIAGNOSIS — R74.8 ELEVATED ALKALINE PHOSPHATASE LEVEL: ICD-10-CM

## 2021-07-01 DIAGNOSIS — E78.2 MIXED HYPERLIPIDEMIA: ICD-10-CM

## 2021-07-01 DIAGNOSIS — I10 ESSENTIAL HYPERTENSION, BENIGN: ICD-10-CM

## 2021-07-01 DIAGNOSIS — R74.01 ELEVATED ALT MEASUREMENT: ICD-10-CM

## 2021-07-01 PROCEDURE — 3051F HG A1C>EQUAL 7.0%<8.0%: CPT | Performed by: INTERNAL MEDICINE

## 2021-07-01 PROCEDURE — 99214 OFFICE O/P EST MOD 30 MIN: CPT | Performed by: INTERNAL MEDICINE

## 2021-07-01 RX ORDER — METFORMIN HYDROCHLORIDE 500 MG/1
TABLET, EXTENDED RELEASE ORAL
Qty: 270 TABLET | Refills: 3 | Status: SHIPPED | OUTPATIENT
Start: 2021-07-01 | End: 2022-05-02

## 2021-07-01 RX ORDER — LISINOPRIL 20 MG/1
TABLET ORAL
Qty: 90 TABLET | Refills: 3 | Status: SHIPPED | OUTPATIENT
Start: 2021-07-01 | End: 2022-05-02 | Stop reason: SINTOL

## 2021-07-01 NOTE — PATIENT INSTRUCTIONS
1) Your Hemoglobin A1c (3 month test of blood sugar) has risen due to being off track with diet. I will increase your metformin to 1 tab with breakfast and 2 tabs with dinner and sent an updated prescription to [facility].    2) Try not to eat more than 45 grams of carbs per meal (1 palm/fist sized serving = 30 grams; carbs are potatoes, rice, pasta, bread, fruit, corn, peas, cereal, desserts)    3) Microalbumin/creatinine ratio is a marker of the amount of protein in your urine. Goal is less than 30. Your value is abnormal but slightly better than last time. This indicates that your kidneys are being affected by your uncontrolled diabetes and/or blood pressure. Please increase your lisinopril to 20 mg daily and I sent a new prescription to [facility].  Take 2 of the 10 mg tabs once daily until these run out and then take 1 of the 20 mg tabs once daily. 4) Your triglycerides (short term fats) were higher due to higher sugar and higher A1c.    5) Your ALT (liver test) has come down over the past 6 months. 6) Please come for a follow up visit on 1/21/22 at 2:30pm in our [place] office. 7) I put an order directly into the [employer] system to repeat your labs in the 1-2 weeks prior to your next visit so just ask for the order under my name and you will receive a courtesy reminder through [employer] to have these drawn.

## 2021-07-01 NOTE — PROGRESS NOTES
Chief Complaint   Patient presents with    Diabetes     MYCHART       **THIS IS A VIRTUAL VISIT VIA A VIDEO SYNCHRONOUS DISCUSSION. PATIENT AGREED TO HAVE THEIR CARE DELIVERED OVER A MYCHART VIDEO VISIT IN PLACE OF THEIR REGULARLY SCHEDULED OFFICE VISIT**    History of Present Illness: Stella Avelar is a 54 y.o. female here for follow up of diabetes. Recovered from [facility] and hasn't had any steroids recently. Did have some alcohol the week before her labs were drawn. Fasting sugars are 120-135 and bedtime readings are 180-195. Not missing the metformin but needs to get back on track with her diet. Has been taking the higher dose of lisinopril 10 mg daily. Home BP readings are around 142/70. Weight is currently 176 stable from last in person visit in 12/19. Current Outpatient Medications   Medication Sig    metFORMIN ER (GLUCOPHAGE XR) 500 mg tablet TAKE 1 TABLET BY MOUTH TWICE A DAY    lisinopriL (PRINIVIL, ZESTRIL) 10 mg tablet TAKE 1 TABLET BY MOUTH EVERY DAY--delete the 5 mg tab from profile    cholecalciferol (VITAMIN D3) 1,000 unit cap Take  by mouth daily. No current facility-administered medications for this visit.      Allergies   Allergen Reactions    Dilaudid [Hydromorphone] Anaphylaxis    Keflex [Cephalexin] Rash    Pcn [Penicillins] Swelling    Shellfish Derived Swelling     Review of Systems: PER HPI    Physical Examination:  - GENERAL: NCAT, Appears well nourished   - EYES: EOMI, non-icteric, no proptosis   - Ear/Nose/Throat: NCAT, no visible inflammation or masses   - CARDIOVASCULAR: no cyanosis, no visible JVD   - RESPIRATORY: respiratory effort normal without any distress or labored breathing   - MUSCULOSKELETAL: Normal ROM of neck and upper extremities observed   - SKIN: No rash on face  - NEUROLOGIC:  No facial asymmetry (Cranial nerve 7 motor function), No gaze palsy   - PSYCHIATRIC: Normal affect, Normal insight and judgement       Data Reviewed:   Component Latest Ref Rng & Units 6/19/2021 6/19/2021 6/19/2021 6/19/2021           8:57 AM  8:57 AM  8:57 AM  8:57 AM   Glucose      65 - 99 mg/dL  160 (H)     BUN      6 - 24 mg/dL  18     Creatinine      0.57 - 1.00 mg/dL  1.03 (H)     GFR est non-AA      >59 mL/min/1.73  61     GFR est AA      >59 mL/min/1.73  71     BUN/Creatinine ratio      9 - 23  17     Sodium      134 - 144 mmol/L  140     Potassium      3.5 - 5.2 mmol/L  4.9     Chloride      96 - 106 mmol/L  102     CO2      20 - 29 mmol/L  23     Calcium      8.7 - 10.2 mg/dL  9.4     Protein, total      6.0 - 8.5 g/dL  7.4     Albumin      3.8 - 4.9 g/dL  4.4     GLOBULIN, TOTAL      1.5 - 4.5 g/dL  3.0     A-G Ratio      1.2 - 2.2  1.5     Bilirubin, total      0.0 - 1.2 mg/dL  0.3     Alk. phosphatase      48 - 121 IU/L  116     AST      0 - 40 IU/L  26     ALT      0 - 32 IU/L  43 (H)     Cholesterol, total      100 - 199 mg/dL 224 (H)      Triglyceride      0 - 149 mg/dL 538 (H)      HDL Cholesterol      >39 mg/dL 34 (L)      VLDL, calculated      5 - 40 mg/dL 91 (H)      LDL, calculated      0 - 99 mg/dL 99      Creatinine, urine      Not Estab. mg/dL    58.7   Microalbumin, urine      Not Estab. ug/mL    123.1   Microalbumin/Creat. Ratio      0 - 29 mg/g creat    210 (H)   Hemoglobin A1c, (calculated)      4.8 - 5.6 %   7.6 (H)    Estimated average glucose      mg/dL   171        Assessment/Plan:     1. Uncontrolled type 2 diabetes mellitus with diabetic neuropathy, without long-term current use of insulin ([facility] [address]): her most recent Hgb A1c was 7.6% in 6/21 up from 6.8% in 12/20 up from 6.5% up from 6.3% in 9/19 up from 5.5% in 3/19 down from 8.7% in 10/18 during her admission for urosepsis. Her A1c has trended up due to being off track with her diet so will increase her metformin and get back on track with her diet.   - increase metformin  mg to 1 tab in am and 2 tabs in pm  - check bs 3x/week at alternating times  - foot exam done 10/19  - due for eye exam, last 9/17  - blood pressure at goal < 140/90 on lisinopril for renal protection  - microalbumin 597 in 3/19, started lisinopril 5 mg in 9/19 and down to 223 2 weeks later in 9/19 and up to 235 in 12/20 so increased to 10 mg daily and down to 210 in 6/21 so increased to 20 mg daily  - check Hgb A1c, cmp, and microalbumin prior to next visit          2. Elevated alkaline phosphatase level: level was 118 in 3/19 possible due to mild D deficiency so had her start vitamin D 1000 Units daily and level down to 107 in 10/19 and D level normal at 46 and 109 in 5/20 and 116 in 6/21  - cont vitamin D 1000 units daily       3. Elevated ALT measurement: level was 57 in 3/19 possibly from wine or mild fatty liver disease but normal in 9/19 and in 5/20. Up to 114 in 12/20 but down to 43 in 6/21     4. Hypertension: blood pressure just above goal < 140/90 so increased lisinopril.  - increase lisinopril to 20 mg daily    5. Mixed Hyperlipidemia: LDL 88 in 3/19 and also in 5/20 but .  in 12/20 and non-. LDL 99 and  and non- in 6/21 due to diet and higher A1c  - check lipids prior to next visit    Patient Instructions   1) Your Hemoglobin A1c (3 month test of blood sugar) has risen due to being off track with diet. I will increase your metformin to 1 tab with breakfast and 2 tabs with dinner and sent an updated prescription to [facility].    2) Try not to eat more than 45 grams of carbs per meal (1 palm/fist sized serving = 30 grams; carbs are potatoes, rice, pasta, bread, fruit, corn, peas, cereal, desserts)    3) Microalbumin/creatinine ratio is a marker of the amount of protein in your urine. Goal is less than 30. Your value is abnormal but slightly better than last time. This indicates that your kidneys are being affected by your uncontrolled diabetes and/or blood pressure.   Please increase your lisinopril to 20 mg daily and I sent a new prescription to [facility].  Take 2 of the 10 mg tabs once daily until these run out and then take 1 of the 20 mg tabs once daily. 4) Your triglycerides (short term fats) were higher due to higher sugar and higher A1c.    5) Your ALT (liver test) has come down over the past 6 months. 6) Please come for a follow up visit on 1/21/22 at 2:30pm in our [place] office. 7) I put an order directly into the [employer] system to repeat your labs in the 1-2 weeks prior to your next visit so just ask for the order under my name and you will receive a courtesy reminder through [employer] to have these drawn.                   Follow-up and Dispositions    · Return 1/21/22 at 2:30pm.               Copy sent to:  ODETTE Obrien MD (Urology)

## 2022-01-15 DIAGNOSIS — E11.21 TYPE 2 DIABETES WITH NEPHROPATHY (HCC): Primary | ICD-10-CM

## 2022-01-15 DIAGNOSIS — I10 ESSENTIAL HYPERTENSION, BENIGN: ICD-10-CM

## 2022-01-15 DIAGNOSIS — R74.8 ELEVATED ALKALINE PHOSPHATASE LEVEL: ICD-10-CM

## 2022-01-15 DIAGNOSIS — E78.2 MIXED HYPERLIPIDEMIA: ICD-10-CM

## 2022-01-15 DIAGNOSIS — R74.01 ELEVATED ALT MEASUREMENT: ICD-10-CM

## 2022-03-19 PROBLEM — I10 ESSENTIAL HYPERTENSION, BENIGN: Status: ACTIVE | Noted: 2019-10-18

## 2022-03-19 PROBLEM — N39.0 SEPSIS DUE TO URINARY TRACT INFECTION (HCC): Status: ACTIVE | Noted: 2018-10-18

## 2022-03-19 PROBLEM — A41.9 SEPSIS DUE TO URINARY TRACT INFECTION (HCC): Status: ACTIVE | Noted: 2018-10-18

## 2022-03-19 PROBLEM — E11.21 TYPE 2 DIABETES WITH NEPHROPATHY (HCC): Status: ACTIVE | Noted: 2019-10-18

## 2022-03-19 PROBLEM — E78.2 MIXED HYPERLIPIDEMIA: Status: ACTIVE | Noted: 2020-05-22

## 2022-05-02 ENCOUNTER — VIRTUAL VISIT (OUTPATIENT)
Dept: ENDOCRINOLOGY | Age: 57
End: 2022-05-02
Payer: COMMERCIAL

## 2022-05-02 DIAGNOSIS — E78.2 MIXED HYPERLIPIDEMIA: ICD-10-CM

## 2022-05-02 DIAGNOSIS — I10 ESSENTIAL HYPERTENSION, BENIGN: ICD-10-CM

## 2022-05-02 DIAGNOSIS — E11.21 TYPE 2 DIABETES WITH NEPHROPATHY (HCC): Primary | ICD-10-CM

## 2022-05-02 DIAGNOSIS — R74.01 ELEVATED ALT MEASUREMENT: ICD-10-CM

## 2022-05-02 DIAGNOSIS — R74.8 ELEVATED ALKALINE PHOSPHATASE LEVEL: ICD-10-CM

## 2022-05-02 LAB
ALBUMIN SERPL-MCNC: 4.8 G/DL (ref 3.8–4.9)
ALBUMIN/CREAT UR: 688 MG/G CREAT (ref 0–29)
ALBUMIN/GLOB SERPL: 1.6 {RATIO} (ref 1.2–2.2)
ALP SERPL-CCNC: 118 IU/L (ref 44–121)
ALT SERPL-CCNC: 28 IU/L (ref 0–32)
AST SERPL-CCNC: 22 IU/L (ref 0–40)
BILIRUB SERPL-MCNC: 0.3 MG/DL (ref 0–1.2)
BUN SERPL-MCNC: 25 MG/DL (ref 6–24)
BUN/CREAT SERPL: 19 (ref 9–23)
CALCIUM SERPL-MCNC: 10.2 MG/DL (ref 8.7–10.2)
CHLORIDE SERPL-SCNC: 101 MMOL/L (ref 96–106)
CHOLEST SERPL-MCNC: 229 MG/DL (ref 100–199)
CO2 SERPL-SCNC: 24 MMOL/L (ref 20–29)
CREAT SERPL-MCNC: 1.35 MG/DL (ref 0.57–1)
CREAT UR-MCNC: 64.3 MG/DL
EGFR: 46 ML/MIN/1.73
EST. AVERAGE GLUCOSE BLD GHB EST-MCNC: 169 MG/DL
GLOBULIN SER CALC-MCNC: 3 G/DL (ref 1.5–4.5)
GLUCOSE SERPL-MCNC: 140 MG/DL (ref 65–99)
HBA1C MFR BLD: 7.5 % (ref 4.8–5.6)
HDLC SERPL-MCNC: 42 MG/DL
IMP & REVIEW OF LAB RESULTS: NORMAL
INTERPRETATION: NORMAL
LDLC SERPL CALC-MCNC: 122 MG/DL (ref 0–99)
MICROALBUMIN UR-MCNC: 442.3 UG/ML
POTASSIUM SERPL-SCNC: 4.6 MMOL/L (ref 3.5–5.2)
PROT SERPL-MCNC: 7.8 G/DL (ref 6–8.5)
SODIUM SERPL-SCNC: 140 MMOL/L (ref 134–144)
TRIGL SERPL-MCNC: 367 MG/DL (ref 0–149)
VLDLC SERPL CALC-MCNC: 65 MG/DL (ref 5–40)

## 2022-05-02 PROCEDURE — 99214 OFFICE O/P EST MOD 30 MIN: CPT | Performed by: INTERNAL MEDICINE

## 2022-05-02 PROCEDURE — 3051F HG A1C>EQUAL 7.0%<8.0%: CPT | Performed by: INTERNAL MEDICINE

## 2022-05-02 RX ORDER — FLASH GLUCOSE SENSOR
KIT MISCELLANEOUS
Qty: 2 KIT | Refills: 11 | Status: SHIPPED | OUTPATIENT
Start: 2022-05-02 | End: 2022-06-07 | Stop reason: SDUPTHER

## 2022-05-02 RX ORDER — METFORMIN HYDROCHLORIDE 500 MG/1
TABLET, EXTENDED RELEASE ORAL
Qty: 270 TABLET | Refills: 3
Start: 2022-05-02 | End: 2022-05-26

## 2022-05-02 RX ORDER — OLMESARTAN MEDOXOMIL 20 MG/1
TABLET ORAL
COMMUNITY

## 2022-05-02 NOTE — PROGRESS NOTES
Chief Complaint   Patient presents with    Diabetes     Mychart    Other     pcp and pharmacy confirmed       **THIS IS A VIRTUAL VISIT VIA A VIDEO SYNCHRONOUS DISCUSSION. PATIENT AGREED TO HAVE THEIR CARE DELIVERED OVER A MYCHART VIDEO VISIT IN PLACE OF THEIR REGULARLY SCHEDULED OFFICE VISIT**    History of Present Illness: Lizzeth Joseph is a 64 y.o. female here for follow up of diabetes. Her PCP had increased her lisinopril from 20 to 40 mg about 8 months ago but her BP was still high and got a cough so was changed to the benicar 20 mg bid and her BP is controlled in the 124-130/76-82 range. Has only been taking metformin 1 tab bid. Weight is currently around 176-180 lbs stable from 176 lbs. Did take a 3-4 doses of motrin in the past 2 weeks for sinus headaches and knee pains. Doesn't always drink enough water during the day but does drink coffee in the morning. Never ended up going on metformin and is still taking metformin 1 tab bid. No further kidney stones since 2020. Checks periodically and has seen some readings over 200 after dinner. Current Outpatient Medications   Medication Sig    olmesartan (BENICAR) 20 mg tablet olmesartan 20 mg tablet   Take 1 tablet twice a day by oral route.  ZINC PO Take  by mouth daily.  ascorbic acid (VITAMIN C PO) Take  by mouth daily.  CRANBERRY PO Take  by mouth.  metFORMIN ER (GLUCOPHAGE XR) 500 mg tablet One tab with breakfast and dinner--Dose change 05/02/22--updated med list--did not send prescription to the pharmacy    cholecalciferol (VITAMIN D3) 1,000 unit cap Take  by mouth daily. No current facility-administered medications for this visit.      Allergies   Allergen Reactions    Dilaudid [Hydromorphone] Anaphylaxis    Keflex [Cephalexin] Rash    Pcn [Penicillins] Swelling    Shellfish Derived Swelling     Review of Systems: PER HPI    Physical Examination:  - GENERAL: NCAT, Appears well nourished   - EYES: EOMI, non-icteric, no proptosis   - Ear/Nose/Throat: NCAT, no visible inflammation or masses   - CARDIOVASCULAR: no cyanosis, no visible JVD   - RESPIRATORY: respiratory effort normal without any distress or labored breathing   - MUSCULOSKELETAL: Normal ROM of neck and upper extremities observed   - SKIN: No rash on face  - NEUROLOGIC:  No facial asymmetry (Cranial nerve 7 motor function), No gaze palsy   - PSYCHIATRIC: Normal affect, Normal insight and judgement       Data Reviewed:   Component      Latest Ref Rng & Units 4/30/2022 4/30/2022 4/30/2022 4/30/2022          11:42 AM 11:42 AM 11:42 AM 11:42 AM   Glucose      65 - 99 mg/dL   140 (H)    BUN      6 - 24 mg/dL   25 (H)    Creatinine      0.57 - 1.00 mg/dL   1.35 (H)    eGFR      >59 mL/min/1.73   46 (L)    BUN/Creatinine ratio      9 - 23   19    Sodium      134 - 144 mmol/L   140    Potassium      3.5 - 5.2 mmol/L   4.6    Chloride      96 - 106 mmol/L   101    CO2      20 - 29 mmol/L   24    Calcium      8.7 - 10.2 mg/dL   10.2    Protein, total      6.0 - 8.5 g/dL   7.8    Albumin      3.8 - 4.9 g/dL   4.8    GLOBULIN, TOTAL      1.5 - 4.5 g/dL   3.0    A-G Ratio      1.2 - 2.2   1.6    Bilirubin, total      0.0 - 1.2 mg/dL   0.3    Alk. phosphatase      44 - 121 IU/L   118    AST      0 - 40 IU/L   22    ALT      0 - 32 IU/L   28    Cholesterol, total      100 - 199 mg/dL    229 (H)   Triglyceride      0 - 149 mg/dL    367 (H)   HDL Cholesterol      >39 mg/dL    42   VLDL, calculated      5 - 40 mg/dL    65 (H)   LDL, calculated      0 - 99 mg/dL    122 (H)   Creatinine, urine      Not Estab. mg/dL  64.3     Microalbumin, urine      Not Estab. ug/mL  442.3     Microalbumin/Creat. Ratio      0 - 29 mg/g creat  688 (H)     Hemoglobin A1c, (calculated)      4.8 - 5.6 % 7.5 (H)      Estimated average glucose      mg/dL 169        Assessment/Plan:     1.  Uncontrolled type 2 diabetes mellitus with diabetic neuropathy, without long-term current use of insulin [facility]): her most recent Hgb A1c was 7.5% in 4/22 up from 7.6% in 6/21 up from 6.8% in 12/20 up from 6.5% up from 6.3% in 9/19 up from 5.5% in 3/19 down from 8.7% in 10/18 during her admission for urosepsis. Her A1c is slightly better but still above goal as she never increased her metformin after last visit so will do this now. Roby Cowden 2 should help and she will pay cash for this. - increase metformin  mg to 1 tab in am and 2 tabs in pm  - check bs 3x/week at alternating times  - foot exam done 10/19  - due for eye exam, last 9/17  - microalbumin 597 in 3/19, started lisinopril 5 mg in 9/19 and down to 223 2 weeks later in 9/19 and up to 235 in 12/20 so increased to 10 mg daily and down to 210 in 6/21 so increased to 20 mg daily; changed to benicar 20 mg bid by PCP and up to 688 in 4/22  - check Hgb A1c, cmp, and microalbumin prior to next visit          2. Elevated alkaline phosphatase level: level was 118 in 3/19 possible due to mild D deficiency so had her start vitamin D 1000 Units daily and level down to 107 in 10/19 and D level normal at 46 and 109 in 5/20 and 116 in 6/21 and 118 in 4/22  - cont vitamin D 1000 units daily       3. Elevated ALT measurement: level was 57 in 3/19 possibly from wine or mild fatty liver disease but normal in 9/19 and in 5/20. Up to 114 in 12/20 but down to 43 in 6/21 and 28 in 4/22     4. Hypertension: blood pressure was at goal with PCP  - cont benicar 20 mg bid      5. Mixed Hyperlipidemia: LDL 88 in 3/19 and also in 5/20 but .  in 12/20 and non-. LDL 99 and  and non- in 6/21 due to diet and higher A1c.  and  in 4/22  - check lipids prior to next visit    Patient Instructions   1) Your Hemoglobin A1c (3 month test of blood sugar) was down slightly from 7.6% to 7.5% but still above goal of 7% or less. 2) Be sure to take metformin 1 tab in the morning and 2 tabs at night.     3) I sent the zerved sensors to [employer] to help monitor your blood sugar more closely. Take the goodrx coupon I texted to you to pay cash for these. You will download the [employer] stacey for your I-phone to use your phone as a reader and apply a new sensor every 14 days and be sure to try and wave the reader over the sensor 3 times a day if possible to know how your sugar is running over the previous 8 hours and the sensor will store up to 90 days of data. Try to check up to 6 times daily before and 2 hours after each meal so you can see the effect of food on your sugar. Do your best to focus on the meals that are causing you to spike over 180 when checked 2 hours after a meal and limit your portions of these foods. If you are staying over 130 in the morning and over 180 after meals after 2 weeks on the higher dose of metformin, then further increase to 2 tabs with breakfast and dinner and let me know so I can rewrite your prescription. 4) BUN and creatinine are markers of kidney function. Your values are slightly abnormal possibly from mild dehydration or taking NSAIDs. Avoid all NSAIDs (ibuprofen, aleve, motrin, BC powder) in case this is contributing to your higher creatinine level . If you have pain, you can take extra strength Tylenol up to 3000 mg per day. Drink at least 4 8oz glasses of water everyday to stay hydrated to prevent your creatinine level from going higher. 5) ALT and AST are markers of liver function. Your values are normal.    6) Your urine protein was higher and cholesterol was higher too. 7) Please come for a follow up visit on 10/28/22 at 2:50pm in our [place] office. 8) I put an order directly into the [employer] system to repeat your labs in the 1-2 weeks prior to your next visit so just ask for the order under my name and you will receive a courtesy reminder through [employer] to have these drawn.                   Follow-up and Dispositions    · Return 10/28/22 at 2:50pm.             [facility] evaluated through a synchronous (real-time) audio-video encounter. The patient (or guardian if applicable) is aware that this is a billable service, which includes applicable co-pays. Verbal consent to proceed has been obtained. The visit was conducted pursuant to the emergency declaration under the Formerly named [facility], [address] [place] [address] authority and the [employer] and [employer] General Act. Patient identification was verified, and a caregiver was present when appropriate. The patient was located at home in a state where the provider was licensed to provide care. --Yuriy Anderson MD on 5/2/2022 at 2:10 PM    An electronic signature was used to authenticate this note.       Copy sent to:  ODETTE West MD (Urology)

## 2022-05-02 NOTE — PATIENT INSTRUCTIONS
1) Your Hemoglobin A1c (3 month test of blood sugar) was down slightly from 7.6% to 7.5% but still above goal of 7% or less. 2) Be sure to take metformin 1 tab in the morning and 2 tabs at night. 3) I sent the freestyle libre2 sensors to [facility] to help monitor your blood sugar more closely. Take the goodrx coupon I texted to you to pay cash for these. You will download the [employer] stacey for your I-phone to use your phone as a reader and apply a new sensor every 14 days and be sure to try and wave the reader over the sensor 3 times a day if possible to know how your sugar is running over the previous 8 hours and the sensor will store up to 90 days of data. Try to check up to 6 times daily before and 2 hours after each meal so you can see the effect of food on your sugar. Do your best to focus on the meals that are causing you to spike over 180 when checked 2 hours after a meal and limit your portions of these foods. If you are staying over 130 in the morning and over 180 after meals after 2 weeks on the higher dose of metformin, then further increase to 2 tabs with breakfast and dinner and let me know so I can rewrite your prescription. 4) BUN and creatinine are markers of kidney function. Your values are slightly abnormal possibly from mild dehydration or taking NSAIDs. Avoid all NSAIDs (ibuprofen, aleve, motrin, BC powder) in case this is contributing to your higher creatinine level . If you have pain, you can take extra strength Tylenol up to 3000 mg per day. Drink at least 4 8oz glasses of water everyday to stay hydrated to prevent your creatinine level from going higher. 5) ALT and AST are markers of liver function. Your values are normal.    6) Your urine protein was higher and cholesterol was higher too. 7) Please come for a follow up visit on 10/28/22 at 2:50pm in our [place] office.     8) I put an order directly into the [employer] system to repeat your labs in the 1-2 weeks prior to your next visit so just ask for the order under my name and you will receive a courtesy reminder through [employer] to have these drawn.

## 2022-05-26 RX ORDER — METFORMIN HYDROCHLORIDE 500 MG/1
TABLET, EXTENDED RELEASE ORAL
Qty: 270 TABLET | Refills: 3
Start: 2022-05-26 | End: 2022-06-07

## 2022-06-07 RX ORDER — METFORMIN HYDROCHLORIDE 500 MG/1
TABLET, EXTENDED RELEASE ORAL
Qty: 360 TABLET | Refills: 3 | Status: SHIPPED | OUTPATIENT
Start: 2022-06-07 | End: 2022-10-27 | Stop reason: ALTCHOICE

## 2022-06-07 RX ORDER — FLASH GLUCOSE SENSOR
KIT MISCELLANEOUS
Qty: 2 KIT | Refills: 11 | Status: SHIPPED | OUTPATIENT
Start: 2022-06-07

## 2022-10-14 DIAGNOSIS — I10 ESSENTIAL HYPERTENSION, BENIGN: ICD-10-CM

## 2022-10-14 DIAGNOSIS — R74.01 ELEVATED ALT MEASUREMENT: ICD-10-CM

## 2022-10-14 DIAGNOSIS — R74.8 ELEVATED ALKALINE PHOSPHATASE LEVEL: ICD-10-CM

## 2022-10-14 DIAGNOSIS — E78.2 MIXED HYPERLIPIDEMIA: ICD-10-CM

## 2022-10-14 DIAGNOSIS — E11.21 TYPE 2 DIABETES WITH NEPHROPATHY (HCC): ICD-10-CM

## 2022-10-17 LAB
ALBUMIN SERPL-MCNC: 4.6 G/DL (ref 3.8–4.9)
ALBUMIN/CREAT UR: 34 MG/G CREAT (ref 0–29)
ALBUMIN/GLOB SERPL: 1.8 {RATIO} (ref 1.2–2.2)
ALP SERPL-CCNC: 86 IU/L (ref 44–121)
ALT SERPL-CCNC: 26 IU/L (ref 0–32)
AST SERPL-CCNC: 24 IU/L (ref 0–40)
BILIRUB SERPL-MCNC: 0.3 MG/DL (ref 0–1.2)
BUN SERPL-MCNC: 23 MG/DL (ref 6–24)
BUN/CREAT SERPL: 17 (ref 9–23)
CALCIUM SERPL-MCNC: 10 MG/DL (ref 8.7–10.2)
CHLORIDE SERPL-SCNC: 99 MMOL/L (ref 96–106)
CHOLEST SERPL-MCNC: 168 MG/DL (ref 100–199)
CO2 SERPL-SCNC: 23 MMOL/L (ref 20–29)
CREAT SERPL-MCNC: 1.35 MG/DL (ref 0.57–1)
CREAT UR-MCNC: 67.1 MG/DL
EGFR: 46 ML/MIN/1.73
EST. AVERAGE GLUCOSE BLD GHB EST-MCNC: 146 MG/DL
GLOBULIN SER CALC-MCNC: 2.5 G/DL (ref 1.5–4.5)
GLUCOSE SERPL-MCNC: 117 MG/DL (ref 70–99)
HBA1C MFR BLD: 6.7 % (ref 4.8–5.6)
HDLC SERPL-MCNC: 42 MG/DL
IMP & REVIEW OF LAB RESULTS: NORMAL
INTERPRETATION: NORMAL
LDLC SERPL CALC-MCNC: 71 MG/DL (ref 0–99)
MICROALBUMIN UR-MCNC: 22.9 UG/ML
POTASSIUM SERPL-SCNC: 5.1 MMOL/L (ref 3.5–5.2)
PROT SERPL-MCNC: 7.1 G/DL (ref 6–8.5)
SODIUM SERPL-SCNC: 137 MMOL/L (ref 134–144)
TRIGL SERPL-MCNC: 347 MG/DL (ref 0–149)
VLDLC SERPL CALC-MCNC: 55 MG/DL (ref 5–40)

## 2022-10-27 ENCOUNTER — OFFICE VISIT (OUTPATIENT)
Dept: ENDOCRINOLOGY | Age: 57
End: 2022-10-27
Payer: COMMERCIAL

## 2022-10-27 VITALS
SYSTOLIC BLOOD PRESSURE: 113 MMHG | HEART RATE: 78 BPM | DIASTOLIC BLOOD PRESSURE: 79 MMHG | BODY MASS INDEX: 27.74 KG/M2 | WEIGHT: 177.13 LBS

## 2022-10-27 DIAGNOSIS — I10 ESSENTIAL HYPERTENSION, BENIGN: ICD-10-CM

## 2022-10-27 DIAGNOSIS — E78.2 MIXED HYPERLIPIDEMIA: ICD-10-CM

## 2022-10-27 DIAGNOSIS — R74.01 ELEVATED ALT MEASUREMENT: ICD-10-CM

## 2022-10-27 DIAGNOSIS — E11.21 TYPE 2 DIABETES WITH NEPHROPATHY (HCC): Primary | ICD-10-CM

## 2022-10-27 DIAGNOSIS — R74.8 ELEVATED ALKALINE PHOSPHATASE LEVEL: ICD-10-CM

## 2022-10-27 PROCEDURE — 3044F HG A1C LEVEL LT 7.0%: CPT | Performed by: INTERNAL MEDICINE

## 2022-10-27 PROCEDURE — 99214 OFFICE O/P EST MOD 30 MIN: CPT | Performed by: INTERNAL MEDICINE

## 2022-10-27 PROCEDURE — 3074F SYST BP LT 130 MM HG: CPT | Performed by: INTERNAL MEDICINE

## 2022-10-27 PROCEDURE — 3078F DIAST BP <80 MM HG: CPT | Performed by: INTERNAL MEDICINE

## 2022-10-27 RX ORDER — PRIMIDONE 50 MG/1
50 TABLET ORAL DAILY
COMMUNITY
Start: 2022-06-21

## 2022-10-27 RX ORDER — PRAVASTATIN SODIUM 20 MG/1
20 TABLET ORAL DAILY
COMMUNITY

## 2022-10-27 RX ORDER — BISMUTH SUBSALICYLATE 262 MG
1 TABLET,CHEWABLE ORAL DAILY
COMMUNITY

## 2022-10-27 RX ORDER — SITAGLIPTIN AND METFORMIN HYDROCHLORIDE 100; 1000 MG/1; MG/1
TABLET, FILM COATED, EXTENDED RELEASE ORAL
Qty: 14 TABLET | Refills: 0 | Status: SHIPPED | COMMUNITY
Start: 2022-10-27 | End: 2022-11-03 | Stop reason: SDUPTHER

## 2022-10-27 NOTE — PROGRESS NOTES
Chief Complaint   Patient presents with    Diabetes    Medication Evaluation     Metformin SE: Constipation x 5-6 months, tx consist of milk of magnesium       Vitals:    10/27/22 1459   BP: 113/79   Pulse: 78   Weight: 177 lb 2 oz (80.3 kg)         Health Maintenance will be followed up by PCP. 1. Have you been to the ER, urgent care clinic since your last visit? Hospitalized since your last visit? No    2. Have you seen or consulted any other health care providers outside of the [address] [place] [address] since your last visit? Include any pap smears or colon screening.  No

## 2022-10-27 NOTE — PROGRESS NOTES
Chief Complaint   Patient presents with    Diabetes    Medication Evaluation     Metformin SE: Constipation x 5-6 months, tx consist of milk of magnesium     History of Present Illness: Estuardo Norris is a 62 y.o. female here for follow up of diabetes. Weight up 1 lbs since last visit in person in 10/19. Did try the [place] Clamp intermittently over the past 6 months and has used a total of 4 sensors and has found the data helpful with food choices. Worked up to 2 tabs bid of metformin and this is keeping her fasting sugars in the 110-130s but has noticed more constipation on the higher dose and sometimes this affects her ability to pass her urine. Was put on pravastatin 20 mg daily and primidone 50 mg daily in 5/22 and unclear if either of these could be contributing to constipation in addition to the metformin. Given her prior history of UTIs and kidney stones, we agreed to try janumet instead of farxiga or jardiance. Current Outpatient Medications   Medication Sig    pravastatin (PRAVACHOL) 20 mg tablet 20 mg daily. primidone (MYSOLINE) 50 mg tablet 50 mg daily. multivitamin (ONE A DAY) tablet Take 1 Tablet by mouth daily. metFORMIN ER (GLUCOPHAGE XR) 500 mg tablet Take 2 tabs with breakfast and dinner    olmesartan (BENICAR) 20 mg tablet olmesartan 20 mg tablet   Take 1 tablet twice a day by oral route. CRANBERRY PO Take  by mouth. FreeStyle Donna 2 Sensor kit Use as directed to test blood sugars at least 4 times daily. Replace every 14 days--patient will pay cash and use [employer] coupon (Patient not taking: Reported on 10/27/2022)     No current facility-administered medications for this visit.      Allergies   Allergen Reactions    Dilaudid [Hydromorphone] Anaphylaxis    Keflex [Cephalexin] Rash    Lisinopril Cough    Pcn [Penicillins] Swelling    Shellfish Derived Swelling     Review of Systems: PER HPI    Physical Examination:  Blood pressure 113/79, pulse 78, weight 177 lb 2 oz (80.3 kg). General: pleasant, no distress, good eye contact   Neck: no carotid bruits  Cardiovascular: regular, normal rate, nl s1 and s2, no m/r/g,   Respiratory: clear bilaterally  Integumentary: no edema,   Psychiatric: normal mood and affect    Diabetic foot exam:     Left Foot:   Visual Exam: callous - mild   Pulse DP: 2+ (normal)   Filament test: normal sensation    Vibratory sensation: normal      Right Foot:   Visual Exam: callous - mild   Pulse DP: 2+ (normal)   Filament test: normal sensation    Vibratory sensation: normal        Data Reviewed:   Component      Latest Ref Rng & Units 10/15/2022   Glucose      70 - 99 mg/dL 117 (H)   BUN      6 - 24 mg/dL 23   Creatinine      0.57 - 1.00 mg/dL 1.35 (H)   eGFR      >59 mL/min/1.73 46 (L)   BUN/Creatinine ratio      9 - 23 17   Sodium      134 - 144 mmol/L 137   Potassium      3.5 - 5.2 mmol/L 5.1   Chloride      96 - 106 mmol/L 99   CO2      20 - 29 mmol/L 23   Calcium      8.7 - 10.2 mg/dL 10.0   Protein, total      6.0 - 8.5 g/dL 7.1   Albumin      3.8 - 4.9 g/dL 4.6   GLOBULIN, TOTAL      1.5 - 4.5 g/dL 2.5   A-G Ratio      1.2 - 2.2 1.8   Bilirubin, total      0.0 - 1.2 mg/dL 0.3   Alk. phosphatase      44 - 121 IU/L 86   AST      0 - 40 IU/L 24   ALT      0 - 32 IU/L 26   Cholesterol, total      100 - 199 mg/dL 168   Triglyceride      0 - 149 mg/dL 347 (H)   HDL Cholesterol      >39 mg/dL 42   VLDL, calculated      5 - 40 mg/dL 55 (H)   LDL, calculated      0 - 99 mg/dL 71   Creatinine, urine random      Not Estab. mg/dL 67.1   Microalbumin, urine      Not Estab. ug/mL 22.9   Microalbumin/Creat. Ratio      0 - 29 mg/g creat 34 (H)   Hemoglobin A1c, (calculated)      4.8 - 5.6 % 6.7 (H)   Estimated average glucose      mg/dL 146       Assessment/Plan:     1.  Uncontrolled type 2 diabetes mellitus with diabetic neuropathy, without long-term current use of insulin ([facility] [address]): her most recent Hgb A1c was 6.7% in 10/22 down from 7.5% in 4/22 up from 7.6% in 6/21 up from 6.8% in 12/20 up from 6.5% up from 6.3% in 9/19 up from 5.5% in 3/19 down from 8.7% in 10/18 during her admission for urosepsis. Her A1c is better on max dose of metformin but having more constipation so will change to janumet XR with a lower dose of metformin to see if she can tolerate this. Advised of rare risk of pancreatitis (less than 1:1000). - stop metformin  mg 2 tabs bid  - begin janumet /1000 mg daily  - check bs 3x/week at alternating times  - foot exam done 10/22  - due for eye exam, last 9/17  - microalbumin 597 in 3/19, started lisinopril 5 mg in 9/19 and down to 223 2 weeks later in 9/19 and up to 235 in 12/20 so increased to 10 mg daily and down to 210 in 6/21 so increased to 20 mg daily; changed to benicar 20 mg bid by PCP and up to 688 in 4/22, down to 34 in 10/22.  - check Hgb A1c, cmp, and microalbumin prior to next visit          2. Elevated alkaline phosphatase level: level was 118 in 3/19 possible due to mild D deficiency so had her start vitamin D 1000 Units daily and level down to 107 in 10/19 and D level normal at 46 and 109 in 5/20 and 116 in 6/21 and 118 in 4/22 and 86 in 10/22.  - cont vitamin D 1000 units daily       3. Elevated ALT measurement: level was 57 in 3/19 possibly from wine or mild fatty liver disease but normal in 9/19 and in 5/20. Up to 114 in 12/20 but down to 43 in 6/21 and 28 in 4/22 and 26 in 10/22     4. Hypertension: blood pressure was at goal < 140/90  - cont benicar 20 mg bid      5. Mixed Hyperlipidemia: LDL 88 in 3/19 and also in 5/20 but .  in 12/20 and non-. LDL 99 and  and non- in 6/21 due to diet and higher A1c.  and  in 4/22.   Started on pravastatin 20 mg daily in 5/22 and LDL 71 and  in 10/22.  - cont pravastatin 20 mg daily  - check lipids prior to next visit      Patient Instructions   1) Try janumet /1000 mg 1 tab daily either with breakfast and dinner or at bedtime in place of the generic metformin ER. Give this one week and provided you are tolerating this and it's controlling your sugars are your constipation is better then let me know and I will send a prescription to the pharmacy in case this requires a prior authorization and then we can work on this. 2) Check blood sugars once daily either fasting (goal is ) or 2 hours after a meal (goal is less than 180). Alternate one meal a day to check (example: one day check after breakfast, one day after lunch, one day after dinner). Write down what you ate if your blood sugar is more than 180 so you can know to cut back or cut out this food from your diet. 3) Your urine protein is much improved and your creatinine (kidney test) is stable. 4) ALT and AST are markers of liver function. Your values are normal.    5) Your blood pressure is controlled. 6) Your total and LDL (bad cholesterol) are much better on the pravastatin. The triglycerides (short term fats) were still up but this med doesn't lower these as well but hopefully with keeping your weight down and your A1c down, that these will improve over time. Follow-up and Dispositions    Return in about 6 months (around 4/27/2023).                Copy sent to:  ODETTE Chin MD (Urology)

## 2022-10-27 NOTE — PATIENT INSTRUCTIONS
1) Try janumet /1000 mg 1 tab daily either with breakfast and dinner or at bedtime in place of the generic metformin ER. Give this one week and provided you are tolerating this and it's controlling your sugars are your constipation is better then let me know and I will send a prescription to the pharmacy in case this requires a prior authorization and then we can work on this. 2) Check blood sugars once daily either fasting (goal is ) or 2 hours after a meal (goal is less than 180). Alternate one meal a day to check (example: one day check after breakfast, one day after lunch, one day after dinner). Write down what you ate if your blood sugar is more than 180 so you can know to cut back or cut out this food from your diet. 3) Your urine protein is much improved and your creatinine (kidney test) is stable. 4) ALT and AST are markers of liver function. Your values are normal.    5) Your blood pressure is controlled. 6) Your total and LDL (bad cholesterol) are much better on the pravastatin. The triglycerides (short term fats) were still up but this med doesn't lower these as well but hopefully with keeping your weight down and your A1c down, that these will improve over time.

## 2022-11-03 RX ORDER — SITAGLIPTIN AND METFORMIN HYDROCHLORIDE 100; 1000 MG/1; MG/1
TABLET, FILM COATED, EXTENDED RELEASE ORAL
Qty: 90 TABLET | Refills: 3 | Status: SHIPPED | OUTPATIENT
Start: 2022-11-03

## 2022-12-15 ENCOUNTER — HOSPITAL ENCOUNTER (EMERGENCY)
Age: 57
Discharge: HOME | End: 2022-12-15
Attending: EMERGENCY MEDICINE
Payer: COMMERCIAL

## 2022-12-15 VITALS
OXYGEN SATURATION: 99 % | RESPIRATION RATE: 18 BRPM | HEIGHT: 67 IN | SYSTOLIC BLOOD PRESSURE: 121 MMHG | WEIGHT: 178.13 LBS | BODY MASS INDEX: 27.96 KG/M2 | TEMPERATURE: 98.1 F | DIASTOLIC BLOOD PRESSURE: 80 MMHG | HEART RATE: 101 BPM

## 2022-12-15 DIAGNOSIS — R33.8 ACUTE URINARY RETENTION: Primary | ICD-10-CM

## 2022-12-15 LAB
APPEARANCE UR: ABNORMAL
BACTERIA URNS QL MICRO: NEGATIVE /HPF
BILIRUB UR QL: NEGATIVE
COLOR UR: ABNORMAL
EPITH CASTS URNS QL MICRO: ABNORMAL /LPF
GLUCOSE UR STRIP.AUTO-MCNC: NEGATIVE MG/DL
GRAN CASTS URNS QL MICRO: ABNORMAL /LPF
HGB UR QL STRIP: ABNORMAL
KETONES UR QL STRIP.AUTO: NEGATIVE MG/DL
LEUKOCYTE ESTERASE UR QL STRIP.AUTO: ABNORMAL
NITRITE UR QL STRIP.AUTO: NEGATIVE
PH UR STRIP: 5 [PH] (ref 5–8)
PROT UR STRIP-MCNC: 30 MG/DL
RBC #/AREA URNS HPF: ABNORMAL /HPF (ref 0–5)
SP GR UR REFRACTOMETRY: 1.01
UA: UC IF INDICATED,UAUC: ABNORMAL
UROBILINOGEN UR QL STRIP.AUTO: 0.2 EU/DL (ref 0.2–1)
WBC URNS QL MICRO: ABNORMAL /HPF (ref 0–4)

## 2022-12-15 PROCEDURE — 51798 US URINE CAPACITY MEASURE: CPT

## 2022-12-15 PROCEDURE — 51703 INSERT BLADDER CATH COMPLEX: CPT

## 2022-12-15 PROCEDURE — 74011250637 HC RX REV CODE- 250/637: Performed by: EMERGENCY MEDICINE

## 2022-12-15 PROCEDURE — 99283 EMERGENCY DEPT VISIT LOW MDM: CPT

## 2022-12-15 PROCEDURE — 74011250636 HC RX REV CODE- 250/636: Performed by: EMERGENCY MEDICINE

## 2022-12-15 PROCEDURE — 81001 URINALYSIS AUTO W/SCOPE: CPT

## 2022-12-15 PROCEDURE — 51702 INSERT TEMP BLADDER CATH: CPT

## 2022-12-15 RX ORDER — ONDANSETRON 4 MG/1
4 TABLET, ORALLY DISINTEGRATING ORAL
Status: COMPLETED | OUTPATIENT
Start: 2022-12-15 | End: 2022-12-15

## 2022-12-15 RX ORDER — NITROFURANTOIN 25; 75 MG/1; MG/1
100 CAPSULE ORAL 2 TIMES DAILY
Qty: 6 CAPSULE | Refills: 0 | Status: SHIPPED | OUTPATIENT
Start: 2022-12-15 | End: 2022-12-18

## 2022-12-15 RX ORDER — OXYCODONE HYDROCHLORIDE 5 MG/1
5 TABLET ORAL
Status: COMPLETED | OUTPATIENT
Start: 2022-12-15 | End: 2022-12-15

## 2022-12-15 RX ORDER — TAMSULOSIN HYDROCHLORIDE 0.4 MG/1
0.4 CAPSULE ORAL DAILY
Qty: 7 CAPSULE | Refills: 0 | Status: SHIPPED | OUTPATIENT
Start: 2022-12-15 | End: 2022-12-22

## 2022-12-15 RX ADMIN — OXYCODONE 5 MG: 5 TABLET ORAL at 18:07

## 2022-12-15 RX ADMIN — OXYCODONE 5 MG: 5 TABLET ORAL at 15:42

## 2022-12-15 RX ADMIN — ONDANSETRON 4 MG: 4 TABLET, ORALLY DISINTEGRATING ORAL at 15:42

## 2022-12-15 NOTE — ED PROVIDER NOTES
EMERGENCY DEPARTMENT HISTORY AND PHYSICAL EXAM      Date: 12/15/2022  Patient Name: [facility]    History of Presenting Illness     Chief Complaint   Patient presents with    Urinary Retention     Pt states she last urinated at 6 am before colonoscopy and hasn't been able to urinate since colonoscopy, complains of pain to pelvis       History Provided By: Patient    HPI: [facility], 62 y.o. female presents to the ED with cc of urinary retention. Patient has a history of diabetes and a urethral stricture. She underwent a colonoscopy today, states she has not urinated since 6 AM.  She has lower abdominal pain, which is an 8 out of 10 in severity. She took 3 Tylenol prior to arrival which did help somewhat with her pain. She denies nausea or vomiting, fever or chills. She denies back pain, chest pain, cough or shortness of breath. She states that this has happened to her in the past, and she either needed to have her urethra stretched, or a Barkley inserted. There are no other complaints, changes, or physical findings at this time. PCP: Emily Montelongo NP    No current facility-administered medications on file prior to encounter. Current Outpatient Medications on File Prior to Encounter   Medication Sig Dispense Refill    Janumet -1,000 mg TM24 Take 1 tab daily--replaces generic metformin ER 90 Tablet 3    pravastatin (PRAVACHOL) 20 mg tablet 20 mg daily. primidone (MYSOLINE) 50 mg tablet 50 mg daily. multivitamin (ONE A DAY) tablet Take 1 Tablet by mouth daily. FreeStyle Donna 2 Sensor kit Use as directed to test blood sugars at least 4 times daily. Replace every 14 days--patient will pay cash and use [employer] coupon (Patient not taking: Reported on 10/27/2022) 2 Kit 11    olmesartan (BENICAR) 20 mg tablet olmesartan 20 mg tablet   Take 1 tablet twice a day by oral route. CRANBERRY PO Take  by mouth.          Past History     Past Medical History:  Past Medical History:   Diagnosis Date    Diabetes ([facility] [address]     H/O urethral stricture     Hydronephrosis, left     Hypertension     Kidney infection 2018    Mixed hyperlipidemia 2020    Renal stone     Unilateral reflux nephropathy     UTI (urinary tract infection)        Past Surgical History:  Past Surgical History:   Procedure Laterality Date    HX  SECTION      HX OTHER SURGICAL  dilatation of urethral stricture    HX OTHER SURGICAL  h/o left reimplant for VURD    HX UROLOGICAL      kidney 2006    [place] CYSTOSCOPY,INSERT URETERAL STENT         Family History:  Family History   Problem Relation Age of Onset    Hypertension Mother     Hypertension Father     Other Father         celiac disease    Diabetes Paternal Grandmother     Diabetes Paternal Grandfather     Heart Disease Neg Hx     Stroke Neg Hx        Social History:  Social History     Tobacco Use    Smoking status: Never    Smokeless tobacco: Never   Substance Use Topics    Alcohol use: Yes     Alcohol/week: 3.0 standard drinks     Types: 1 Glasses of wine, 1 Cans of beer, 1 Shots of liquor per week     Comment: rare    Drug use: No       Allergies: Allergies   Allergen Reactions    Dilaudid [Hydromorphone] Anaphylaxis    Keflex [Cephalexin] Rash    Lisinopril Cough    Pcn [Penicillins] Swelling    Shellfish Derived Swelling         Review of Systems   Review of Systems   Constitutional:  Negative for fever. HENT:  Negative for congestion. Eyes: Negative. Respiratory:  Negative for shortness of breath. Cardiovascular:  Negative for chest pain. Gastrointestinal:  Positive for abdominal pain. Endocrine: Negative for heat intolerance. Genitourinary: Negative. Musculoskeletal:  Negative for back pain. Skin:  Negative for rash. Allergic/Immunologic: Negative for immunocompromised state. Neurological:  Negative for dizziness. Hematological:  Does not bruise/bleed easily. Psychiatric/Behavioral: Negative. All other systems reviewed and are negative. Physical Exam   Physical Exam  Vitals and nursing note reviewed. Constitutional:       General: She is not in acute distress. Appearance: She is well-developed. HENT:      Head: Normocephalic. Cardiovascular:      Rate and Rhythm: Normal rate and regular rhythm. Pulses: Normal pulses. Heart sounds: Normal heart sounds. Pulmonary:      Effort: Pulmonary effort is normal.      Breath sounds: Normal breath sounds. Abdominal:      General: Bowel sounds are normal.      Palpations: Abdomen is soft. Tenderness: There is abdominal tenderness. Musculoskeletal:         General: Normal range of motion. Cervical back: Normal range of motion and neck supple. Skin:     General: Skin is warm and dry. Neurological:      General: No focal deficit present. Mental Status: She is alert and oriented to person, place, and time. Psychiatric:         Mood and Affect: Mood normal.         Behavior: Behavior normal.       Diagnostic Study Results     Labs -   No results found for this or any previous visit (from the past 12 hour(s)). Radiologic Studies -   No orders to display     CT Results  (Last 48 hours)      None          CXR Results  (Last 48 hours)      None            Medical Decision Making   I am the first provider for this patient. I reviewed the vital signs, available nursing notes, past medical history, past surgical history, family history and social history. Vital Signs-Reviewed the patient's vital signs. Patient Vitals for the past 12 hrs:   Temp Pulse Resp BP SpO2   12/15/22 1505 99.1 °F (37.3 °C) (!) 101 18 (!) 151/89 98 %         Records Reviewed: Nursing Notes and Old Medical Records    Provider Notes (Medical Decision Making):   Urinary retention, UTI,    ED Course:   Initial assessment performed.  The patients presenting problems have been discussed, and they are in agreement with the care plan formulated and outlined with them. I have encouraged them to ask questions as they arise throughout their visit. Consult note:    Krisitne, nurse practitioner from Urology, is talking to the nurse, regarding Barkley placement. Nursing had initially tried a Coude, with no success. They then tried a silicone catheter, with no success. Dr. Grubbs will come in to place a catheter. Hallie Salcido is requesting a cystoscopy and the urology cart be at the bedside                Critical Care Time:   0      Disposition:  home    DISCHARGE PLAN:  1. Discharge Medication List as of 12/15/2022  8:01 PM        START taking these medications    Details   tamsulosin (Flomax) 0.4 mg capsule Take 1 Capsule by mouth daily for 7 doses. , Normal, Disp-7 Capsule, R-0      nitrofurantoin, macrocrystal-monohydrate, (Macrobid) 100 mg capsule Take 1 Capsule by mouth two (2) times a day for 3 days. , Normal, Disp-6 Capsule, R-0           CONTINUE these medications which have NOT CHANGED    Details   Janumet -1,000 mg TM24 Take 1 tab daily--replaces generic metformin ER, Normal, Disp-90 Tablet, R-3, NATALIE      pravastatin (PRAVACHOL) 20 mg tablet 20 mg daily. , Historical Med      primidone (MYSOLINE) 50 mg tablet 50 mg daily. , Historical Med      multivitamin (ONE A DAY) tablet Take 1 Tablet by mouth daily. , Historical Med      FreeStyle Donna 2 Sensor kit Use as directed to test blood sugars at least 4 times daily. Replace every 14 days--patient will pay cash and use [employer] coupon, Normal, Disp-2 Kit, R-11, NATALIE      olmesartan (BENICAR) 20 mg tablet olmesartan 20 mg tablet   Take 1 tablet twice a day by oral route., Historical Med      CRANBERRY PO Take  by mouth., Historical Med           2.    Follow-up Information       Follow up With Specialties Details Why Contact Info    Christa Ruiz NP Nurse Practitioner  As needed [address] [facility]  [phone]      Irma Hough MD Urology Call in 1 day Schedule an appointment [address] [facility]  [phone]      [facility] EMERGENCY DEPT Emergency Medicine  If symptoms worsen [address]  [address] [facility]  [phone]          3. Return to ED if worse     Diagnosis     Clinical Impression:   1. Acute urinary retention        Attestations:    Dee Cabral MD        Please note that this dictation was completed with [employer], the computer voice recognition software. Quite often unanticipated grammatical, syntax, homophones, and other interpretive errors are inadvertently transcribed by the computer software. Please disregard these errors. Please excuse any errors that have escaped final proofreading. Thank you.

## 2022-12-15 NOTE — ED NOTES
Urology advised to attempt rod again w/ a silicone catheter. RN to call w/ update.     Connie: [phone]

## 2022-12-16 NOTE — ED NOTES
Discharge instructions discussed with pt-state understanding. Pt remains AOX4, PWD, VSS, pain 0/10, leg bag placed at this time. Pt ambulatory with steady gait to waiting room with .

## 2022-12-16 NOTE — CONSULTS
Urology Consult    Subjective:     Date of Consultation:  December 15, 2022    Reason for Consultation:  Urinary retention    History of Present Illness:   Patient is a 62 y.o. woman known to  with a history of urinary retention. She follows with Dr. Maureen Light. Hx of urethral stricture. She had a colonoscopy this AM, and has not voided since that time. Complains of bladder distension and inability to void. No f/c/n/v. Past Medical History:   Diagnosis Date    Diabetes ([address]     H/O urethral stricture     Hydronephrosis, left     Hypertension     Kidney infection 2018    Mixed hyperlipidemia 2020    Renal stone     Unilateral reflux nephropathy     UTI (urinary tract infection)       Past Surgical History:   Procedure Laterality Date    HX  SECTION      HX OTHER SURGICAL  dilatation of urethral stricture    HX OTHER SURGICAL  h/o left reimplant for VURD    HX UROLOGICAL      kidney     [place] CYSTOSCOPY,INSERT URETERAL STENT        Family History   Problem Relation Age of Onset    Hypertension Mother     Hypertension Father     Other Father         celiac disease    Diabetes Paternal Grandmother     Diabetes Paternal Grandfather     Heart Disease Neg Hx     Stroke Neg Hx       Social History     Tobacco Use    Smoking status: Never    Smokeless tobacco: Never   Substance Use Topics    Alcohol use: Yes     Alcohol/week: 3.0 standard drinks     Types: 1 Glasses of wine, 1 Cans of beer, 1 Shots of liquor per week     Comment: rare     Allergies   Allergen Reactions    Dilaudid [Hydromorphone] Anaphylaxis    Keflex [Cephalexin] Rash    Lisinopril Cough    Pcn [Penicillins] Swelling    Shellfish Derived Swelling      Prior to Admission medications    Medication Sig Start Date End Date Taking? Authorizing Provider   Janumet -1,000 mg TM24 Take 1 tab daily--replaces generic metformin ER 11/3/22   Gaynel Councilman, MD   pravastatin (PRAVACHOL) 20 mg tablet 20 mg daily.     Provider, Historical primidone (MYSOLINE) 50 mg tablet 50 mg daily. 22   Provider, Historical   multivitamin (ONE A DAY) tablet Take 1 Tablet by mouth daily. Provider, Historical   FreeStyle Donna 2 Sensor kit Use as directed to test blood sugars at least 4 times daily. Replace every 14 days--patient will pay cash and use [employer] coupon  Patient not taking: Reported on 10/27/2022 6/7/22   Brenden Vang MD   olmesartan (BENICAR) 20 mg tablet olmesartan 20 mg tablet   Take 1 tablet twice a day by oral route. Provider, Historical   CRANBERRY PO Take  by mouth. Provider, Historical         Review of Systems:  A comprehensive review of systems was negative except for that written in the HPI. Objective:     Patient Vitals for the past 8 hrs:   BP Temp Pulse Resp SpO2 Weight   12/15/22 1505 (!) 151/89 99.1 °F (37.3 °C) (!) 101 18 98 % 80.8 kg (178 lb 2.1 oz)     Temp (24hrs), Av.1 °F (37.3 °C), Min:99.1 °F (37.3 °C), Max:99.1 °F (37.3 °C)      Intake and Output:   No intake/output data recorded. Physical Exam:            General:    alert, cooperative, no distress, appears stated age                     Skin:  Normal.                HEENT:  Normal        Throat/Neck:  normal and no erythema or exudates noted. Lymph nodes:  Cervical, supraclavicular, and axillary nodes normal.                 Lungs:  clear to auscultation bilaterally      Cardiovascular:  no edema             Abdomen[de-identified]  soft, non-tender. Bowel sounds normal. No masses,  no organomegaly             Genitalia:  Normal external genitalia. Extremities:  negative       Assessment:     Active Problems:    * No active hospital problems. *        60yoF with hx of urinary retention. Nursing unable to place rod. Plan:     Using sterile technique, I attempted to place a coude catheter with resistance met. I inserted a Bentson wire into the bladder and placed an 18fr [place]-tip rod over the wire into the bladder.  Immediate return of >500cc of clear yellow urine.    - Continue rod catheter  - Follow up in 3-4 days for void trial at [facility]

## 2022-12-16 NOTE — ED NOTES
Bedside and Verbal shift change report given to Isabela Solomon RN (oncoming nurse) by Avtar Lima RN (offgoing nurse). Report included the following information SBAR, Kardex, ED Summary, MAR, Recent Results, and Med Rec Status.

## 2022-12-16 NOTE — ED NOTES
Pt asking about discharge paperwork-message sent to provider, reports pain completely gone. Pt remains AOX4.

## 2023-02-06 ENCOUNTER — TELEPHONE (OUTPATIENT)
Dept: ENDOCRINOLOGY | Age: 58
End: 2023-02-06

## 2023-02-06 RX ORDER — SITAGLIPTIN AND METFORMIN HYDROCHLORIDE 100; 1000 MG/1; MG/1
TABLET, FILM COATED, EXTENDED RELEASE ORAL
Qty: 90 TABLET | Refills: 3 | Status: SHIPPED | OUTPATIENT
Start: 2023-02-06 | End: 2023-02-09 | Stop reason: CLARIF

## 2023-02-07 NOTE — TELEPHONE ENCOUNTER
Can you look into this? To be safe I sent a new prescription to [facility] now.  -------------------------------------------------------------------------------------------------------------------    We'll look into this to see if we need to do a prior authorization or not on this medication. If you have new insurance as of January, then please scan a copy of your card front and back to [place]. Thanks.  ===View-only below this line===      ----- Message -----       From:Suma Valle       Sent:2/6/2023  6:34 PM EST         To:Patient Medical Advice Request Message List    Subject:Medication    Its the [facility]. I told them it had refills but they said I needed approval from my doctor. I am not sure if its because I have new insurance. Thank you    Juliano Guzman       ----- Message -----       From:Daily GOSS       Sent:2/6/2023  8:33 AM EST         To:Suma Valle    Subject:Medication    Good morning   Which [facility]? Dr Maciel King sent a prescription for Janumet on 11/03/2022, a 90 day supply with 3 refills, making that prescription good for one year. Janumet -1,000 mg TM24 90 Tablet 3 11/3/2022     Sig: Take 1 tab daily--replaces generic metformin ER    Sent to pharmacy as: Janumet  mg-1,000 mg tablet,extended release    E-Prescribing Status: Receipt confirmed by pharmacy (11/3/2022  4:15 PM EDT)        ----- Message -----       [address] [facility]       Sent:2/5/2023 10:23 AM EST         To:Seng Fox MD    Subject:Medication    Good morning Dr. Maciel King [facility] said theyre still waiting on you to approve my refill for my Janumet and I only have four pills left. Could you send that into the pharmacy please   thank you, Juliano Guzman.

## 2023-02-09 RX ORDER — LINAGLIPTIN AND METFORMIN HYDROCHLORIDE 5; 1000 MG/1; MG/1
TABLET, FILM COATED, EXTENDED RELEASE ORAL
Qty: 90 EACH | Refills: 3 | Status: SHIPPED | OUTPATIENT
Start: 2023-02-09

## 2023-02-09 NOTE — TELEPHONE ENCOUNTER
Sent her the following message through [employer]:    I received a fax from your insurance that the janumet XR was denied because you have not tried and failed their preferred med in class which is called jentadueto XR and works very similarly to [employer]. 5/1000 mg of jentadueto is equivalent to 100/1000 mg of janumet. I sent this to [employer] now. Let me know if you have any trouble getting this filled.

## 2023-04-10 DIAGNOSIS — R74.01 ELEVATED ALT MEASUREMENT: ICD-10-CM

## 2023-04-10 DIAGNOSIS — E78.2 MIXED HYPERLIPIDEMIA: ICD-10-CM

## 2023-04-10 DIAGNOSIS — R74.8 ELEVATED ALKALINE PHOSPHATASE LEVEL: ICD-10-CM

## 2023-04-10 DIAGNOSIS — E11.21 TYPE 2 DIABETES WITH NEPHROPATHY (HCC): ICD-10-CM

## 2023-04-10 DIAGNOSIS — I10 ESSENTIAL HYPERTENSION, BENIGN: ICD-10-CM

## 2023-04-28 ENCOUNTER — TELEPHONE (OUTPATIENT)
Dept: ENDOCRINOLOGY | Age: 58
End: 2023-04-28

## 2023-04-28 RX ORDER — SITAGLIPTIN AND METFORMIN HYDROCHLORIDE 100; 1000 MG/1; MG/1
TABLET, FILM COATED, EXTENDED RELEASE ORAL
Qty: 90 TABLET | Refills: 3 | Status: SHIPPED | OUTPATIENT
Start: 2023-04-28

## 2023-04-28 NOTE — TELEPHONE ENCOUNTER
Sent her the following message through [employer]:    I received a fax from [employer] that now that jentadueto XR 5/1000 mg tabs are no longer covered and your insurance prefers the janumet /1000 mg tabs like you used to take so I sent these back to [employer] now to take 1 tab daily and let me know if you have any trouble getting this filled.

## 2023-04-29 LAB
ALBUMIN SERPL-MCNC: 4.9 G/DL (ref 3.8–4.9)
ALBUMIN/CREAT UR: 22 MG/G CREAT (ref 0–29)
ALBUMIN/GLOB SERPL: 1.8 {RATIO} (ref 1.2–2.2)
ALP SERPL-CCNC: 105 IU/L (ref 44–121)
ALT SERPL-CCNC: 44 IU/L (ref 0–32)
AST SERPL-CCNC: 58 IU/L (ref 0–40)
BILIRUB SERPL-MCNC: 0.4 MG/DL (ref 0–1.2)
BUN SERPL-MCNC: 26 MG/DL (ref 6–24)
BUN/CREAT SERPL: 20 (ref 9–23)
CALCIUM SERPL-MCNC: 10.1 MG/DL (ref 8.7–10.2)
CHLORIDE SERPL-SCNC: 101 MMOL/L (ref 96–106)
CHOLEST SERPL-MCNC: 215 MG/DL (ref 100–199)
CO2 SERPL-SCNC: 24 MMOL/L (ref 20–29)
CREAT SERPL-MCNC: 1.29 MG/DL (ref 0.57–1)
CREAT UR-MCNC: 77.1 MG/DL
EGFRCR SERPLBLD CKD-EPI 2021: 48 ML/MIN/1.73
EST. AVERAGE GLUCOSE BLD GHB EST-MCNC: 148 MG/DL
GLOBULIN SER CALC-MCNC: 2.8 G/DL (ref 1.5–4.5)
GLUCOSE SERPL-MCNC: 133 MG/DL (ref 70–99)
HBA1C MFR BLD: 6.8 % (ref 4.8–5.6)
HDLC SERPL-MCNC: 46 MG/DL
IMP & REVIEW OF LAB RESULTS: NORMAL
LDLC SERPL CALC-MCNC: 101 MG/DL (ref 0–99)
MICROALBUMIN UR-MCNC: 16.6 UG/ML
POTASSIUM SERPL-SCNC: 4.8 MMOL/L (ref 3.5–5.2)
PROT SERPL-MCNC: 7.7 G/DL (ref 6–8.5)
REPORT: NORMAL
SODIUM SERPL-SCNC: 141 MMOL/L (ref 134–144)
TRIGL SERPL-MCNC: 402 MG/DL (ref 0–149)
VLDLC SERPL CALC-MCNC: 68 MG/DL (ref 5–40)

## 2023-05-04 ENCOUNTER — VIRTUAL VISIT (OUTPATIENT)
Dept: ENDOCRINOLOGY | Age: 58
End: 2023-05-04

## 2023-05-04 DIAGNOSIS — E11.21 TYPE 2 DIABETES WITH NEPHROPATHY (HCC): Primary | ICD-10-CM

## 2023-05-04 DIAGNOSIS — I10 ESSENTIAL HYPERTENSION, BENIGN: ICD-10-CM

## 2023-05-04 DIAGNOSIS — R74.8 ELEVATED ALKALINE PHOSPHATASE LEVEL: ICD-10-CM

## 2023-05-04 DIAGNOSIS — E78.2 MIXED HYPERLIPIDEMIA: ICD-10-CM

## 2023-05-04 DIAGNOSIS — R74.01 ELEVATED ALT MEASUREMENT: ICD-10-CM

## 2023-05-04 RX ORDER — FLASH GLUCOSE SENSOR
KIT MISCELLANEOUS
Qty: 2 KIT | Refills: 11 | Status: SHIPPED | OUTPATIENT
Start: 2023-05-04

## 2023-05-04 RX ORDER — OLMESARTAN MEDOXOMIL 40 MG/1
40 TABLET ORAL DAILY
COMMUNITY

## 2023-05-04 RX ORDER — LINAGLIPTIN AND METFORMIN HYDROCHLORIDE 5; 1000 MG/1; MG/1
TABLET, FILM COATED, EXTENDED RELEASE ORAL DAILY
COMMUNITY

## 2023-05-08 ENCOUNTER — TELEPHONE (OUTPATIENT)
Age: 58
End: 2023-05-08

## 2023-05-08 NOTE — TELEPHONE ENCOUNTER
PA initiated through [employer] for  Jentadueto XR 5-1000 mg     Judge Maldonado approved 05/08/2023 - 05/07/2026 PA #: [id number] [facility] [id number] SS. Pt notified via [address]

## 2023-05-12 RX ORDER — LINAGLIPTIN AND METFORMIN HYDROCHLORIDE 5; 1000 MG/1; MG/1
TABLET, FILM COATED, EXTENDED RELEASE ORAL
Qty: 90 TABLET | Refills: 3 | Status: SHIPPED | OUTPATIENT
Start: 2023-05-12

## 2023-09-18 NOTE — PROGRESS NOTES
Hospitalist Progress Note NAME: Edilma Reich :  1965 MRN:  [id number] Assessment / Plan: 
Euvolemic Hyponatremia Hyperkalemia JOSE 
-multifactorial.  1/2NS with KCl discontinued 
-received kayexalate, IV calcium, bicarb, dextrose and insulin. K wnl 
-currently on IVF per nephro 
-nephrology consulted 
  
T2DM 
-on diabetic diet 
-will add SSI with q6h accuchecks Sepsis  
Recurrent UTI with L hydroureteronephrosis -IVF resuscitation, repeat lactate 
-manage by primary team. S/p PCN on 10/19 
-abx per ID 
  
  
Code Status: full DVT Prophylaxis: per primary team  
 
  
 
Subjective: Chief Complaint / Reason for Physician Visit Pt denies complaints. Febrile and tachycardic last night. Restarted on IVF, Bcx drawn. Discussed with RN events overnight. Review of Systems: 
Symptom Y/N Comments  Symptom Y/N Comments Fever/Chills n   Chest Pain n   
Poor Appetite    Edema Cough    Abdominal Pain n   
Sputum    Joint Pain SOB/BRISENO n   Pruritis/Rash Nausea/vomit    Tolerating PT/OT Diarrhea    Tolerating Diet Constipation    Other Could NOT obtain due to:   
 
Objective: VITALS:  
Last 24hrs VS reviewed since prior progress note. Most recent are: 
Patient Vitals for the past 24 hrs: 
 Temp Pulse Resp BP SpO2  
10/20/18 0820 99.1 °F (37.3 °C) (!) 109 16 103/64 98 % 10/20/18 0236 98.2 °F (36.8 °C) 97 14 111/76 98 % 10/19/18 2328 98.6 °F (37 °C)      
10/19/18 2258 99 °F (37.2 °C) (!) 105 18 106/60 93 % 10/19/18 2243 99.4 °F (37.4 °C)      
10/19/18 2129 (!) 101.1 °F (38.4 °C) (!) 125     
10/19/18 2047 100.1 °F (37.8 °C) (!) 114     
10/19/18 1926 99.5 °F (37.5 °C) (!) 126 16 116/68 94 % 10/19/18 1211 97.6 °F (36.4 °C) 80 18 154/85 100 % 10/19/18 1025  78 20 110/79 98 % 10/19/18 1020  78 18 107/75 98 % 10/19/18 1015  80 18 108/78 98 % 10/19/18 1010  81 16 109/76 98 % 10/19/18 1005  88 16 109/76 98 % Patient contacted transplant clinic wondering if she should hold her metoprolol in anticipation of her stress test tomorrow.     Contacted stress lab. Stress lab recommends holding metoprolol today. Can bring tomorrow's dose with her to appointment and take after test completed.    Patient updated and prefers to keep with schedule of taking metoprolol in the morning. She will hold metoprolol today and tomorrow and resume on Wednesday.   10/19/18 1000  85 16 106/70 99 % 10/19/18 0955  87 15 106/76 100 % 10/19/18 0950  87 15 105/78 100 % 10/19/18 0945  91 20 116/76 100 % 10/19/18 0943  87 18 122/83 100 % 10/19/18 0913 98.6 °F (37 °C) 70 18 118/80 98 % Intake/Output Summary (Last 24 hours) at 10/20/2018 0830 Last data filed at 10/20/2018 9746 Gross per 24 hour Intake 450 ml Output 2830 ml Net -2380 ml PHYSICAL EXAM: 
General: WD, WN. Alert, cooperative, no acute distress   
EENT:  EOMI. Anicteric sclerae. MMM Resp:  CTA bilaterally, no wheezing or rales. No accessory muscle use CV:  Regular  rhythm,  No edema GI:  Soft, Non distended, Non tender.  +Bowel sounds Neurologic:  Alert and oriented X 3, normal speech, Psych:   Good insight. Not anxious nor agitated Skin:  No rashes. No jaundice Reviewed most current lab test results and cultures  YES Reviewed most current radiology test results   YES Review and summation of old records today    NO Reviewed patient's current orders and MAR    YES 
PMH/SH reviewed - no change compared to H&P 
________________________________________________________________________ Care Plan discussed with: 
  Comments Patient x Family RN x Care Manager Consultant Multidiciplinary team rounds were held today with , nursing, pharmacist and clinical coordinator. Patient's plan of care was discussed; medications were reviewed and discharge planning was addressed. ________________________________________________________________________ Total NON critical care TIME:  35  Minutes Total CRITICAL CARE TIME Spent:   Minutes non procedure based Comments >50% of visit spent in counseling and coordination of care    
________________________________________________________________________ Demi Emanuel MD  
 
Procedures: see electronic medical records for all procedures/Xrays and details which were not copied into this note but were reviewed prior to creation of Plan. LABS: 
I reviewed today's most current labs and imaging studies. Pertinent labs include: 
Recent Labs 10/20/18 
0242 10/19/18 
0315 WBC 17.2* 10.6 HGB 9.0* 9.6* HCT 27.9* 28.5*  
 481* Recent Labs 10/20/18 
0242 10/19/18 2015 10/19/18 
1450 * 126* 130*  
K 4.5 4.4 4.8  
CL 94* 94* 99  
CO2 23 20* 22 * 254* 234* BUN 38* 43* 52* CREA 1.85* 1.99* 2.08* CA 8.5 9.0 9.3 PHOS 4.5  --   --   
ALB 2.7*  --   --   
 
 
Signed: Alex Cummins MD

## 2023-10-13 DIAGNOSIS — E78.2 MIXED HYPERLIPIDEMIA: ICD-10-CM

## 2023-10-13 DIAGNOSIS — R74.8 ELEVATED ALKALINE PHOSPHATASE LEVEL: ICD-10-CM

## 2023-10-13 DIAGNOSIS — E11.21 TYPE 2 DIABETES WITH NEPHROPATHY (HCC): ICD-10-CM

## 2023-10-13 DIAGNOSIS — R74.01 ELEVATED ALT MEASUREMENT: ICD-10-CM

## 2023-10-13 DIAGNOSIS — I10 ESSENTIAL HYPERTENSION, BENIGN: ICD-10-CM

## 2023-11-04 ENCOUNTER — HOSPITAL ENCOUNTER (EMERGENCY)
Facility: HOSPITAL | Age: 58
Discharge: HOME | End: 2023-11-04
Attending: EMERGENCY MEDICINE
Payer: COMMERCIAL

## 2023-11-04 VITALS
SYSTOLIC BLOOD PRESSURE: 107 MMHG | HEIGHT: 67 IN | WEIGHT: 175.93 LBS | BODY MASS INDEX: 27.61 KG/M2 | DIASTOLIC BLOOD PRESSURE: 78 MMHG | TEMPERATURE: 98.1 F | HEART RATE: 72 BPM | RESPIRATION RATE: 14 BRPM | OXYGEN SATURATION: 97 %

## 2023-11-04 DIAGNOSIS — N12 PYELONEPHRITIS: Primary | ICD-10-CM

## 2023-11-04 LAB
APPEARANCE UR: ABNORMAL
BACTERIA URNS QL MICRO: ABNORMAL /HPF
BILIRUB UR QL: NEGATIVE
COLOR UR: ABNORMAL
EPITH CASTS URNS QL MICRO: ABNORMAL /LPF
GLUCOSE UR STRIP.AUTO-MCNC: NEGATIVE MG/DL
HGB UR QL STRIP: ABNORMAL
HYALINE CASTS URNS QL MICRO: ABNORMAL /LPF (ref 0–5)
KETONES UR QL STRIP.AUTO: NEGATIVE MG/DL
LEUKOCYTE ESTERASE UR QL STRIP.AUTO: ABNORMAL
NITRITE UR QL STRIP.AUTO: POSITIVE
PH UR STRIP: 5.5 (ref 5–8)
PROT UR STRIP-MCNC: 100 MG/DL
RBC #/AREA URNS HPF: ABNORMAL /HPF (ref 0–5)
SP GR UR REFRACTOMETRY: 1.01
URINE CULTURE IF INDICATED: ABNORMAL
UROBILINOGEN UR QL STRIP.AUTO: 1 EU/DL (ref 0.2–1)
WBC URNS QL MICRO: >100 /HPF (ref 0–4)

## 2023-11-04 PROCEDURE — 81001 URINALYSIS AUTO W/SCOPE: CPT

## 2023-11-04 PROCEDURE — 6360000002 HC RX W HCPCS: Performed by: EMERGENCY MEDICINE

## 2023-11-04 PROCEDURE — 99284 EMERGENCY DEPT VISIT MOD MDM: CPT

## 2023-11-04 PROCEDURE — 96365 THER/PROPH/DIAG IV INF INIT: CPT

## 2023-11-04 PROCEDURE — 87086 URINE CULTURE/COLONY COUNT: CPT

## 2023-11-04 PROCEDURE — 96366 THER/PROPH/DIAG IV INF ADDON: CPT

## 2023-11-04 RX ORDER — CIPROFLOXACIN 2 MG/ML
400 INJECTION, SOLUTION INTRAVENOUS ONCE
Status: COMPLETED | OUTPATIENT
Start: 2023-11-04 | End: 2023-11-04

## 2023-11-04 RX ORDER — ONDANSETRON 4 MG/1
4 TABLET, ORALLY DISINTEGRATING ORAL 3 TIMES DAILY PRN
Qty: 21 TABLET | Refills: 0 | Status: SHIPPED | OUTPATIENT
Start: 2023-11-04

## 2023-11-04 RX ADMIN — CIPROFLOXACIN 400 MG: 400 INJECTION, SOLUTION INTRAVENOUS at 13:05

## 2023-11-04 ASSESSMENT — PAIN SCALES - GENERAL: PAINLEVEL_OUTOF10: 9

## 2023-11-04 NOTE — ED NOTES
Pt discharged by Lotus Mascorro MD. Discharge instructions discussed and pt given opportunity to ask questions.  Pt ambulatory out of ED        Karyle Marseille, RN  11/04/23 3526

## 2023-11-04 NOTE — ED PROVIDER NOTES
[facility] EMERGENCY DEPT  EMERGENCY DEPARTMENT ENCOUNTER       Pt Name: Mey Mercedes  MRN: [id number]  [address] [facility] 1965  Date of evaluation: 2023  Provider: Sosa Chvaira MD   PCP: TATIANA Adrian NP  Note Started: 2:16 PM 23     CHIEF COMPLAINT       Chief Complaint   Patient presents with    Fatigue    Flank Pain     Pt reports she has been treated for uti, but was switched from Bactrim to Cipro 2 days ago-Dr. Earl Swann urologist. Pt says she is starting to feel weak, and has flank pain left side. Pt began self cath , prescribed 3 months ago. Pt does this once weekly. She can void but it hurts severely. \"My ureter has a stricture\"      HISTORY OF PRESENT ILLNESS: 1 or more elements      History From: Patient, History limited by: None     Mey Mercedes is a 62 y.o. female who presents with dysuria and flank pain. She describes burning dysuria since Friday, constant left flank pain starting Tuesday. Associated nausea vomiting. Reports generalized weakness, palpitations and foggy sensation in her brain over the past 12 hours. No fever or chills. She reports she self caths once weekly due to urethral strictures, has frequent UTIs. Nursing Notes were all reviewed and agreed with or any disagreements were addressed in the HPI. REVIEW OF SYSTEMS      Positives and Pertinent negatives as per HPI.     PAST HISTORY     Past Medical History:  Past Medical History:   Diagnosis Date    Diabetes ([address])     H/O urethral stricture     Hydronephrosis, left     Hypertension     Kidney infection 2018    Mixed hyperlipidemia 2020    Renal stone     Unilateral reflux nephropathy     UTI (urinary tract infection)        Past Surgical History:  Past Surgical History:   Procedure Laterality Date     SECTION      CYSTOSCOPY,INSERT URETERAL STENT      IR NEPHROSTOMY PERCUTANEOUS LEFT  10/19/2018    IR NEPHROSTOMY PERCUTANEOUS LEFT 10/19/2018 [facility]    OTHER SURGICAL

## 2023-11-05 LAB
BACTERIA SPEC CULT: NORMAL
SERVICE CMNT-IMP: NORMAL

## 2023-11-20 ENCOUNTER — HOSPITAL ENCOUNTER (OUTPATIENT)
Facility: HOSPITAL | Age: 58
Discharge: HOME | End: 2023-11-23
Attending: STUDENT IN AN ORGANIZED HEALTH CARE EDUCATION/TRAINING PROGRAM
Payer: COMMERCIAL

## 2023-11-20 DIAGNOSIS — N13.30 HYDRONEPHROSIS, UNSPECIFIED HYDRONEPHROSIS TYPE: ICD-10-CM

## 2023-11-20 PROCEDURE — 6360000002 HC RX W HCPCS: Performed by: STUDENT IN AN ORGANIZED HEALTH CARE EDUCATION/TRAINING PROGRAM

## 2023-11-20 PROCEDURE — 78708 K FLOW/FUNCT IMAGE W/DRUG: CPT

## 2023-11-20 PROCEDURE — 3430000000 HC RX DIAGNOSTIC RADIOPHARMACEUTICAL: Performed by: STUDENT IN AN ORGANIZED HEALTH CARE EDUCATION/TRAINING PROGRAM

## 2023-11-20 PROCEDURE — A9562 TC99M MERTIATIDE: HCPCS | Performed by: STUDENT IN AN ORGANIZED HEALTH CARE EDUCATION/TRAINING PROGRAM

## 2023-11-20 RX ORDER — FUROSEMIDE 10 MG/ML
20 INJECTION INTRAMUSCULAR; INTRAVENOUS ONCE
Status: COMPLETED | OUTPATIENT
Start: 2023-11-20 | End: 2023-11-20

## 2023-11-20 RX ADMIN — TECHNESCAN TC 99M MERTIATIDE 11 MILLICURIE: 1 INJECTION, POWDER, LYOPHILIZED, FOR SOLUTION INTRAVENOUS at 09:10

## 2023-11-20 RX ADMIN — FUROSEMIDE 20 MG: 10 INJECTION, SOLUTION INTRAMUSCULAR; INTRAVENOUS at 09:14

## 2023-12-28 RX ORDER — LINAGLIPTIN AND METFORMIN HYDROCHLORIDE 5; 1000 MG/1; MG/1
TABLET, FILM COATED, EXTENDED RELEASE ORAL
Qty: 90 TABLET | Refills: 3 | Status: SHIPPED | OUTPATIENT
Start: 2023-12-28

## 2024-01-27 LAB
ALBUMIN SERPL-MCNC: 4.3 G/DL (ref 3.8–4.9)
ALBUMIN/GLOB SERPL: 1.6 {RATIO} (ref 1.2–2.2)
ALP SERPL-CCNC: 116 IU/L (ref 44–121)
ALT SERPL-CCNC: 23 IU/L (ref 0–32)
AST SERPL-CCNC: 31 IU/L (ref 0–40)
BILIRUB SERPL-MCNC: 0.3 MG/DL (ref 0–1.2)
BUN SERPL-MCNC: 28 MG/DL (ref 6–24)
BUN/CREAT SERPL: 22 (ref 9–23)
CALCIUM SERPL-MCNC: 9.9 MG/DL (ref 8.7–10.2)
CHLORIDE SERPL-SCNC: 103 MMOL/L (ref 96–106)
CHOLEST SERPL-MCNC: 235 MG/DL (ref 100–199)
CO2 SERPL-SCNC: 21 MMOL/L (ref 20–29)
CREAT SERPL-MCNC: 1.27 MG/DL (ref 0.57–1)
EGFRCR SERPLBLD CKD-EPI 2021: 49 ML/MIN/1.73
EST. AVERAGE GLUCOSE BLD GHB EST-MCNC: 186 MG/DL
GLOBULIN SER CALC-MCNC: 2.7 G/DL (ref 1.5–4.5)
GLUCOSE SERPL-MCNC: 154 MG/DL (ref 70–99)
HBA1C MFR BLD: 8.1 % (ref 4.8–5.6)
HDLC SERPL-MCNC: 34 MG/DL
IMP & REVIEW OF LAB RESULTS: NORMAL
LDLC SERPL CALC-MCNC: 96 MG/DL (ref 0–99)
Lab: NORMAL
POTASSIUM SERPL-SCNC: 5.3 MMOL/L (ref 3.5–5.2)
PROT SERPL-MCNC: 7 G/DL (ref 6–8.5)
REPORT: NORMAL
REPORT: NORMAL
SODIUM SERPL-SCNC: 138 MMOL/L (ref 134–144)
TRIGL SERPL-MCNC: 627 MG/DL (ref 0–149)
VLDLC SERPL CALC-MCNC: 105 MG/DL (ref 5–40)

## 2024-02-06 ENCOUNTER — OFFICE VISIT (OUTPATIENT)
Age: 59
End: 2024-02-06
Payer: COMMERCIAL

## 2024-02-06 VITALS
BODY MASS INDEX: 28.28 KG/M2 | WEIGHT: 180.2 LBS | SYSTOLIC BLOOD PRESSURE: 135 MMHG | HEART RATE: 75 BPM | DIASTOLIC BLOOD PRESSURE: 86 MMHG | HEIGHT: 67 IN

## 2024-02-06 DIAGNOSIS — R74.01 ELEVATION OF LEVELS OF LIVER TRANSAMINASE LEVELS: ICD-10-CM

## 2024-02-06 DIAGNOSIS — Z79.4 TYPE 2 DIABETES MELLITUS WITH DIABETIC NEPHROPATHY, WITH LONG-TERM CURRENT USE OF INSULIN (HCC): Primary | ICD-10-CM

## 2024-02-06 DIAGNOSIS — E78.2 MIXED HYPERLIPIDEMIA: ICD-10-CM

## 2024-02-06 DIAGNOSIS — I10 ESSENTIAL (PRIMARY) HYPERTENSION: ICD-10-CM

## 2024-02-06 DIAGNOSIS — R74.8 ELEVATED ALKALINE PHOSPHATASE LEVEL: ICD-10-CM

## 2024-02-06 DIAGNOSIS — E66.3 OVERWEIGHT: ICD-10-CM

## 2024-02-06 DIAGNOSIS — E11.21 TYPE 2 DIABETES MELLITUS WITH DIABETIC NEPHROPATHY, WITH LONG-TERM CURRENT USE OF INSULIN (HCC): Primary | ICD-10-CM

## 2024-02-06 PROCEDURE — 3052F HG A1C>EQUAL 8.0%<EQUAL 9.0%: CPT | Performed by: INTERNAL MEDICINE

## 2024-02-06 PROCEDURE — 99214 OFFICE O/P EST MOD 30 MIN: CPT | Performed by: INTERNAL MEDICINE

## 2024-02-06 PROCEDURE — 3079F DIAST BP 80-89 MM HG: CPT | Performed by: INTERNAL MEDICINE

## 2024-02-06 PROCEDURE — 3075F SYST BP GE 130 - 139MM HG: CPT | Performed by: INTERNAL MEDICINE

## 2024-02-06 RX ORDER — ROSUVASTATIN CALCIUM 5 MG/1
5 TABLET, COATED ORAL NIGHTLY
Qty: 90 TABLET | Refills: 3 | Status: SHIPPED | OUTPATIENT
Start: 2024-02-06

## 2024-02-06 NOTE — PROGRESS NOTES
food for 18 hours (or pick another 6 hour window that works for you).  If you get hungry instead of eating, try drinking 8-12 oz of water as often your brain cannot tell the difference between hunger and thirst.    If you absolutely can't resist your hunger, then only have protein like a slice of cheese or deli meat or handful of almonds or 1 teaspoon of peanut butter or a hard boiled egg or try sugar free jello or pudding or raw veggies.    Try not to eat more than 45 grams of carbs for your 2 meals (1 palm/fist sized serving = 30 grams; carbs are potatoes, rice, pasta, bread, fruit, corn, peas, cereal, desserts).  If you really want to lose weight, try not to eat more than 30 grams at your 2 meals.    3) Stop the pravastatin.  We will use rosuvastatin (generic crestor) 5 mg 1 tab at bedtime instead for cholesterol.  This will be ready for  at the pharmacy today.  Watch out for any muscle aches or cramps with this medication and let me know if they develop.  We will watch your liver tests very carefully while on this medication.    4) ALT and AST are markers of liver function.  Your values are normal.    5) Your creatinine (kidney test) is stable.    6) Your blood pressure is controlled.            Return 5/24/24 at 11:50am.        Copy sent to:  Gris Jensen APRN - NP Rhamy, Scott, MD (Urology)

## 2024-02-06 NOTE — PATIENT INSTRUCTIONS
1) Your Hemoglobin A1c (3 month test of blood sugar) was 8.1% up from 6.8% in 4/23 and we'll work to get this back under 7%.  I won't make any changes to your regimen and will focus on diet and exercise first.    2) The key to losing weight is less circulating insulin as the more insulin that circulates in your blood, the harder it is to burn belly fat.  Every time you eat or drink anything with sugar, your pancreas produces more insulin and this will lead to more difficulty losing weight so the more time you spend in a fasting state, the better you will be able to lose weight and when you do eat, you want to limit your carbohydrate intake as best as possible.    Try intermittent fasting where you eat a meal at noon and another one at 6pm and then don't eat any food for 18 hours (or pick another 6 hour window that works for you).  If you get hungry instead of eating, try drinking 8-12 oz of water as often your brain cannot tell the difference between hunger and thirst.    If you absolutely can't resist your hunger, then only have protein like a slice of cheese or deli meat or handful of almonds or 1 teaspoon of peanut butter or a hard boiled egg or try sugar free jello or pudding or raw veggies.    Try not to eat more than 45 grams of carbs for your 2 meals (1 palm/fist sized serving = 30 grams; carbs are potatoes, rice, pasta, bread, fruit, corn, peas, cereal, desserts).  If you really want to lose weight, try not to eat more than 30 grams at your 2 meals.    3) Stop the pravastatin.  We will use rosuvastatin (generic crestor) 5 mg 1 tab at bedtime instead for cholesterol.  This will be ready for  at the pharmacy today.  Watch out for any muscle aches or cramps with this medication and let me know if they develop.  We will watch your liver tests very carefully while on this medication.    4) ALT and AST are markers of liver function.  Your values are normal.    5) Your creatinine (kidney test) is

## 2024-02-14 ENCOUNTER — TELEPHONE (OUTPATIENT)
Age: 59
End: 2024-02-14

## 2024-02-14 RX ORDER — SITAGLIPTIN AND METFORMIN HYDROCHLORIDE 1000; 100 MG/1; MG/1
TABLET, FILM COATED, EXTENDED RELEASE ORAL
Qty: 90 TABLET | Refills: 3 | Status: SHIPPED | OUTPATIENT
Start: 2024-02-14

## 2024-02-14 NOTE — TELEPHONE ENCOUNTER
PA initiated through [email or web address] for Jentadueto XR 5/1000 tab    Jentadueto denied. Letter received stating:  Formulary alternative(s) are Janumet XR. Requirement: 3 in a class with 3 or more alternatives, 2 in a class with 2 alternatives, or 1 in a class with only 1 alternative. Please refer to your plan documents for a complete list of alternatives. Note: Formulary alternatives may require a prior authorization. Your prescriber will be responsible for determining what alternative is appropriate for you.

## 2024-02-14 NOTE — TELEPHONE ENCOUNTER
Sent her the following message through [employer]    Patsy tried to submit an authorization for the jentadueto but they prefer janumet XR so I sent the 100/1000 mg tabs to your local [facility] to take 1 tab daily.  Let me know if you have any trouble getting this filled.  Thanks!

## 2024-05-10 DIAGNOSIS — Z79.4 TYPE 2 DIABETES MELLITUS WITH DIABETIC NEPHROPATHY, WITH LONG-TERM CURRENT USE OF INSULIN (HCC): ICD-10-CM

## 2024-05-10 DIAGNOSIS — R74.8 ELEVATED ALKALINE PHOSPHATASE LEVEL: ICD-10-CM

## 2024-05-10 DIAGNOSIS — E78.2 MIXED HYPERLIPIDEMIA: ICD-10-CM

## 2024-05-10 DIAGNOSIS — R74.01 ELEVATION OF LEVELS OF LIVER TRANSAMINASE LEVELS: ICD-10-CM

## 2024-05-10 DIAGNOSIS — I10 ESSENTIAL (PRIMARY) HYPERTENSION: ICD-10-CM

## 2024-05-10 DIAGNOSIS — E11.21 TYPE 2 DIABETES MELLITUS WITH DIABETIC NEPHROPATHY, WITH LONG-TERM CURRENT USE OF INSULIN (HCC): ICD-10-CM

## 2024-05-17 LAB
ALBUMIN SERPL-MCNC: 4.5 G/DL (ref 3.8–4.9)
ALBUMIN/CREAT UR: 256 MG/G CREAT (ref 0–29)
ALBUMIN/GLOB SERPL: 1.8 {RATIO} (ref 1.2–2.2)
ALP SERPL-CCNC: 129 IU/L (ref 44–121)
ALT SERPL-CCNC: 17 IU/L (ref 0–32)
AST SERPL-CCNC: 26 IU/L (ref 0–40)
BILIRUB SERPL-MCNC: 0.4 MG/DL (ref 0–1.2)
BUN SERPL-MCNC: 25 MG/DL (ref 6–24)
BUN/CREAT SERPL: 18 (ref 9–23)
CALCIUM SERPL-MCNC: 9.5 MG/DL (ref 8.7–10.2)
CHLORIDE SERPL-SCNC: 98 MMOL/L (ref 96–106)
CHOLEST SERPL-MCNC: 153 MG/DL (ref 100–199)
CO2 SERPL-SCNC: 21 MMOL/L (ref 20–29)
CREAT SERPL-MCNC: 1.38 MG/DL (ref 0.57–1)
CREAT UR-MCNC: 51.6 MG/DL
EGFRCR SERPLBLD CKD-EPI 2021: 44 ML/MIN/1.73
GLOBULIN SER CALC-MCNC: 2.5 G/DL (ref 1.5–4.5)
GLUCOSE SERPL-MCNC: 172 MG/DL (ref 70–99)
HBA1C MFR BLD: 8.6 % (ref 4.8–5.6)
HDLC SERPL-MCNC: 37 MG/DL
IMP & REVIEW OF LAB RESULTS: NORMAL
LDLC SERPL CALC-MCNC: 57 MG/DL (ref 0–99)
Lab: NORMAL
MICROALBUMIN UR-MCNC: 132 UG/ML
POTASSIUM SERPL-SCNC: 5 MMOL/L (ref 3.5–5.2)
PROT SERPL-MCNC: 7 G/DL (ref 6–8.5)
REPORT: NORMAL
REPORT: NORMAL
SODIUM SERPL-SCNC: 136 MMOL/L (ref 134–144)
TRIGL SERPL-MCNC: 383 MG/DL (ref 0–149)
VLDLC SERPL CALC-MCNC: 59 MG/DL (ref 5–40)

## 2024-05-24 ENCOUNTER — TELEMEDICINE (OUTPATIENT)
Age: 59
End: 2024-05-24
Payer: COMMERCIAL

## 2024-05-24 DIAGNOSIS — E11.21 TYPE 2 DIABETES MELLITUS WITH DIABETIC NEPHROPATHY, WITH LONG-TERM CURRENT USE OF INSULIN (HCC): Primary | ICD-10-CM

## 2024-05-24 DIAGNOSIS — Z79.4 TYPE 2 DIABETES MELLITUS WITH DIABETIC NEPHROPATHY, WITH LONG-TERM CURRENT USE OF INSULIN (HCC): Primary | ICD-10-CM

## 2024-05-24 DIAGNOSIS — R74.8 ELEVATED ALKALINE PHOSPHATASE LEVEL: ICD-10-CM

## 2024-05-24 DIAGNOSIS — I10 ESSENTIAL (PRIMARY) HYPERTENSION: ICD-10-CM

## 2024-05-24 DIAGNOSIS — R74.01 ELEVATION OF LEVELS OF LIVER TRANSAMINASE LEVELS: ICD-10-CM

## 2024-05-24 DIAGNOSIS — E78.2 MIXED HYPERLIPIDEMIA: ICD-10-CM

## 2024-05-24 DIAGNOSIS — E66.3 OVERWEIGHT: ICD-10-CM

## 2024-05-24 PROCEDURE — 3052F HG A1C>EQUAL 8.0%<EQUAL 9.0%: CPT | Performed by: INTERNAL MEDICINE

## 2024-05-24 PROCEDURE — 99214 OFFICE O/P EST MOD 30 MIN: CPT | Performed by: INTERNAL MEDICINE

## 2024-05-24 RX ORDER — SEMAGLUTIDE 0.68 MG/ML
INJECTION, SOLUTION SUBCUTANEOUS
Qty: 9 ML | Refills: 3 | Status: SHIPPED | OUTPATIENT
Start: 2024-05-24

## 2024-05-24 NOTE — PATIENT INSTRUCTIONS
1) Begin ozempic 0.25 mg once weekly on a day of your choice to help with Hemoglobin A1c (3 month test of blood sugar) and weight loss.  It doesn't matter if this is before or after a meal.  Start at the 0.25 mg dose for 4 weeks.  If you are tolerating this without significant nausea, increase to the 0.5 mg dose.  If you can't tolerate the 0.5 mg dose, then stay at the 0.25 mg dose.  Watch out for any severe abdominal pain that goes to the back and is associated with nausea or vomiting as this may be due to pancreatitis which is the most severe but rarest side effect of this medication.  Please notify me if this occurs.       The pens will come one to a box and you will receive 3 boxes and once you open a pen, you can leave it at room temp for up to 6 weeks.  The unused pens should stay in the fridge.  The first pen will last 6 weeks but the subsequent pens will last 4 weeks.    You will place a new pen needle on the tip of the pen prior to each injection. When you place a new pen needle on the pen, you need to prime the needle first to get out the air.  To do this, turn the pen dial to the flow  (--- symbol) and then push down on the end of the pen to waste this small amount of medicaiton to get out any air bubbles.  Then dial to the desired dose and inject.    Use your abdomen or upper thigh as sites for injection and rotate sites.  Do not inject within 1 inch of your belly button.  Be sure to pinch the skin up and inject perpendicular to the skin and hold the needle in place for at least 5-10 seconds before withdrawing the needle to make sure that all of the medication has been injected under the skin.    I recommend going to [email or web address]://[email or web address].BrandBacker/how-to-use/the-ozempic-pen and click on Watch a video on how to use this pen.    2) Your cholesterol is much improved on the crestor so please stay on this.    3) Your creatinine (kidney test) is slightly higher due to higher A1c of 8.6% up from 8.1%.    4) ALT

## 2024-05-24 NOTE — PROGRESS NOTES
Chief Complaint   Patient presents with    Diabetes     MyChart pt instructed to wait for link [phone]    Other     PCP and pharmacy confirmed       **THIS IS A VIRTUAL VISIT VIA A VIDEO SYNCHRONOUS DISCUSSION. PATIENT AGREED TO HAVE THEIR CARE DELIVERED OVER A [employer] VIDEO VISIT IN PLACE OF THEIR REGULARLY SCHEDULED OFFICE VISIT**    History of Present Illness: Olivia Moreno is a 58 y.o. female here for follow up of diabetes.  Weight is stable at 180 since last visit in 2/24.  She was trying the intermittent fasting after last visit but then her father got sick and she had to travel back and forth to [place] to care for him and hasn't been able to get back on track with her diet and exercise.  Also missed some janumet when she was travelling.  Tolerating the crestor in place of the pravastatin without any myalgias.  Compliant with the benicar.  She would like to try ozempic to get her A1c and weight lower.  Is not having to self-cath every 2-3 days and her urine specimen was from urinating in the cup but it may have been about 2-3 days after her last cath and her specimen may have been contaminated for this reason.    Current Outpatient Medications   Medication Sig    JANUMET -1000 MG TB24 Take 1 tab daily--replaces jentadueto    D-MANNOSE PO Take by mouth daily    rosuvastatin (CRESTOR) 5 MG tablet Take 1 tablet by mouth nightly Replaces pravastatin for cholesterol    CRANBERRY PO Take by mouth    olmesartan (BENICAR) 20 MG tablet daily    primidone (MYSOLINE) 50 MG tablet 1 tablet daily     No current facility-administered medications for this visit.     Allergies   Allergen Reactions    Hydromorphone Anaphylaxis    Lisinopril Cough    Penicillins Swelling    Shellfish Allergy Swelling    Cephalexin Rash       Review of Systems: PER HPI    Physical Examination:  - GENERAL: NCAT, Appears well nourished   - EYES: EOMI, non-icteric, no proptosis   - Ear/Nose/Throat: NCAT, no visible inflammation or

## 2024-06-11 ENCOUNTER — TELEPHONE (OUTPATIENT)
Age: 59
End: 2024-06-11

## 2024-06-11 NOTE — TELEPHONE ENCOUNTER
PA initiated through [email or web address] for Ozempic 0.25 or 0.5 mg     Ozempic approved 06/11/2024 - 06/11/2027 PA #: [place] [id number] SS. Pt notified via [employer]

## 2024-06-13 ENCOUNTER — TELEPHONE (OUTPATIENT)
Age: 59
End: 2024-06-13

## 2024-06-13 NOTE — TELEPHONE ENCOUNTER
Please call pt to let her know she has an unread message in [employer]:    Do you have a very high deductible that needs to be met first?  Can you go on the ozempic website and download a savings card to see if this can help with the cost?      Previous Messages    ----- Message -----       From:Olivia Moreno       Sent:6/11/2024  8:41 PM EDT         To:Patient Medical Advice Request Message List    Subject:Ozempic    Good evening.    I went to the pharmacy and the ozempic was $2600. Are there any other options?    Thank you,    Allyn

## 2024-08-24 DIAGNOSIS — Z79.4 TYPE 2 DIABETES MELLITUS WITH DIABETIC NEPHROPATHY, WITH LONG-TERM CURRENT USE OF INSULIN (HCC): ICD-10-CM

## 2024-08-24 DIAGNOSIS — E66.3 OVERWEIGHT: ICD-10-CM

## 2024-08-24 DIAGNOSIS — E78.2 MIXED HYPERLIPIDEMIA: ICD-10-CM

## 2024-08-24 DIAGNOSIS — E11.21 TYPE 2 DIABETES MELLITUS WITH DIABETIC NEPHROPATHY, WITH LONG-TERM CURRENT USE OF INSULIN (HCC): ICD-10-CM

## 2024-08-24 DIAGNOSIS — I10 ESSENTIAL (PRIMARY) HYPERTENSION: ICD-10-CM

## 2024-08-24 DIAGNOSIS — R74.8 ELEVATED ALKALINE PHOSPHATASE LEVEL: ICD-10-CM

## 2024-08-24 DIAGNOSIS — R74.01 ELEVATION OF LEVELS OF LIVER TRANSAMINASE LEVELS: ICD-10-CM

## 2024-08-29 LAB
ALBUMIN SERPL-MCNC: 4.5 G/DL (ref 3.8–4.9)
ALBUMIN/CREAT UR: 246 MG/G CREAT (ref 0–29)
ALP SERPL-CCNC: 121 IU/L (ref 44–121)
ALT SERPL-CCNC: 34 IU/L (ref 0–32)
AST SERPL-CCNC: 42 IU/L (ref 0–40)
BILIRUB SERPL-MCNC: 0.3 MG/DL (ref 0–1.2)
BUN SERPL-MCNC: 26 MG/DL (ref 6–24)
BUN/CREAT SERPL: 16 (ref 9–23)
CALCIUM SERPL-MCNC: 9.3 MG/DL (ref 8.7–10.2)
CHLORIDE SERPL-SCNC: 102 MMOL/L (ref 96–106)
CHOLEST SERPL-MCNC: 143 MG/DL (ref 100–199)
CO2 SERPL-SCNC: 19 MMOL/L (ref 20–29)
CREAT SERPL-MCNC: 1.6 MG/DL (ref 0.57–1)
CREAT UR-MCNC: 55 MG/DL
EGFRCR SERPLBLD CKD-EPI 2021: 37 ML/MIN/1.73
GLOBULIN SER CALC-MCNC: 2.8 G/DL (ref 1.5–4.5)
GLUCOSE SERPL-MCNC: 200 MG/DL (ref 70–99)
HBA1C MFR BLD: 9.1 % (ref 4.8–5.6)
HDLC SERPL-MCNC: 40 MG/DL
IMP & REVIEW OF LAB RESULTS: NORMAL
LDLC SERPL CALC-MCNC: 63 MG/DL (ref 0–99)
Lab: NORMAL
MICROALBUMIN UR-MCNC: 135.4 UG/ML
POTASSIUM SERPL-SCNC: 5.7 MMOL/L (ref 3.5–5.2)
PROT SERPL-MCNC: 7.3 G/DL (ref 6–8.5)
REPORT: NORMAL
REPORT: NORMAL
SODIUM SERPL-SCNC: 137 MMOL/L (ref 134–144)
TRIGL SERPL-MCNC: 252 MG/DL (ref 0–149)
VLDLC SERPL CALC-MCNC: 40 MG/DL (ref 5–40)

## 2024-09-01 DIAGNOSIS — I10 ESSENTIAL (PRIMARY) HYPERTENSION: ICD-10-CM

## 2024-09-01 RX ORDER — OLMESARTAN MEDOXOMIL AND HYDROCHLOROTHIAZIDE 40/12.5 40; 12.5 MG/1; MG/1
1 TABLET ORAL DAILY
Qty: 90 TABLET | Refills: 3 | Status: SHIPPED | OUTPATIENT
Start: 2024-09-01

## 2024-11-24 DIAGNOSIS — Z79.4 TYPE 2 DIABETES MELLITUS WITH DIABETIC NEPHROPATHY, WITH LONG-TERM CURRENT USE OF INSULIN (HCC): ICD-10-CM

## 2024-11-24 DIAGNOSIS — R74.8 ELEVATED ALKALINE PHOSPHATASE LEVEL: ICD-10-CM

## 2024-11-24 DIAGNOSIS — R74.01 ELEVATION OF LEVELS OF LIVER TRANSAMINASE LEVELS: ICD-10-CM

## 2024-11-24 DIAGNOSIS — E11.21 TYPE 2 DIABETES MELLITUS WITH DIABETIC NEPHROPATHY, WITH LONG-TERM CURRENT USE OF INSULIN (HCC): ICD-10-CM

## 2024-11-24 DIAGNOSIS — E78.2 MIXED HYPERLIPIDEMIA: ICD-10-CM

## 2024-11-24 DIAGNOSIS — I10 ESSENTIAL (PRIMARY) HYPERTENSION: ICD-10-CM

## 2024-11-24 DIAGNOSIS — E66.3 OVERWEIGHT: ICD-10-CM

## 2024-12-04 LAB
BUN SERPL-MCNC: 25 MG/DL (ref 6–24)
BUN/CREAT SERPL: 17 (ref 9–23)
CALCIUM SERPL-MCNC: 10 MG/DL (ref 8.7–10.2)
CHLORIDE SERPL-SCNC: 102 MMOL/L (ref 96–106)
CHOLEST SERPL-MCNC: 164 MG/DL (ref 100–199)
CO2 SERPL-SCNC: 21 MMOL/L (ref 20–29)
CREAT SERPL-MCNC: 1.43 MG/DL (ref 0.57–1)
EGFRCR SERPLBLD CKD-EPI 2021: 42 ML/MIN/1.73
GLUCOSE SERPL-MCNC: 127 MG/DL (ref 70–99)
HBA1C MFR BLD: 7.5 % (ref 4.8–5.6)
HDLC SERPL-MCNC: 42 MG/DL
LDLC SERPL CALC-MCNC: 76 MG/DL (ref 0–99)
POTASSIUM SERPL-SCNC: 5.1 MMOL/L (ref 3.5–5.2)
SODIUM SERPL-SCNC: 139 MMOL/L (ref 134–144)
TRIGL SERPL-MCNC: 284 MG/DL (ref 0–149)
VLDLC SERPL CALC-MCNC: 46 MG/DL (ref 5–40)

## 2024-12-05 LAB
ALBUMIN/CREAT UR: 277 MG/G CREAT (ref 0–29)
CREAT UR-MCNC: 57.3 MG/DL
IMP & REVIEW OF LAB RESULTS: NORMAL
Lab: NORMAL
MICROALBUMIN UR-MCNC: 158.7 UG/ML
REPORT: NORMAL
REPORT: NORMAL

## 2024-12-06 ENCOUNTER — TELEMEDICINE (OUTPATIENT)
Age: 59
End: 2024-12-06
Payer: COMMERCIAL

## 2024-12-06 DIAGNOSIS — R74.8 ELEVATED ALKALINE PHOSPHATASE LEVEL: ICD-10-CM

## 2024-12-06 DIAGNOSIS — R74.01 ELEVATION OF LEVELS OF LIVER TRANSAMINASE LEVELS: ICD-10-CM

## 2024-12-06 DIAGNOSIS — E78.2 MIXED HYPERLIPIDEMIA: ICD-10-CM

## 2024-12-06 DIAGNOSIS — I10 ESSENTIAL (PRIMARY) HYPERTENSION: ICD-10-CM

## 2024-12-06 DIAGNOSIS — Z79.4 TYPE 2 DIABETES MELLITUS WITH DIABETIC NEPHROPATHY, WITH LONG-TERM CURRENT USE OF INSULIN (HCC): Primary | ICD-10-CM

## 2024-12-06 DIAGNOSIS — E66.3 OVERWEIGHT: ICD-10-CM

## 2024-12-06 DIAGNOSIS — E11.21 TYPE 2 DIABETES MELLITUS WITH DIABETIC NEPHROPATHY, WITH LONG-TERM CURRENT USE OF INSULIN (HCC): Primary | ICD-10-CM

## 2024-12-06 PROCEDURE — 99214 OFFICE O/P EST MOD 30 MIN: CPT | Performed by: INTERNAL MEDICINE

## 2024-12-06 PROCEDURE — 3051F HG A1C>EQUAL 7.0%<8.0%: CPT | Performed by: INTERNAL MEDICINE

## 2024-12-06 RX ORDER — ROSUVASTATIN CALCIUM 10 MG/1
10 TABLET, COATED ORAL NIGHTLY
Qty: 90 TABLET | Refills: 3 | Status: SHIPPED | OUTPATIENT
Start: 2024-12-06

## 2024-12-06 RX ORDER — VITAMIN B COMPLEX
1 CAPSULE ORAL DAILY
COMMUNITY

## 2024-12-06 RX ORDER — MULTIVIT-MIN/IRON/FOLIC ACID/K 18-600-40
2000 CAPSULE ORAL DAILY
COMMUNITY
End: 2024-12-06

## 2024-12-06 RX ORDER — OLMESARTAN MEDOXOMIL AND HYDROCHLOROTHIAZIDE 40/12.5 40; 12.5 MG/1; MG/1
1 TABLET ORAL DAILY
Qty: 90 TABLET | Refills: 3 | Status: SHIPPED | OUTPATIENT
Start: 2024-12-06

## 2024-12-06 RX ORDER — GLIPIZIDE 5 MG/1
TABLET ORAL
Qty: 270 TABLET | Refills: 3 | Status: SHIPPED | OUTPATIENT
Start: 2024-12-06

## 2024-12-06 RX ORDER — CITALOPRAM HYDROBROMIDE 10 MG/1
10 TABLET ORAL EVERY MORNING
COMMUNITY
Start: 2024-10-16 | End: 2025-01-14

## 2024-12-06 NOTE — PROGRESS NOTES
level normal at 46 and 109 in 5/20 and normal ever since  - cont vitamin D 1000 units daily        6. Overweight: weight stable at 180 from 5/23 to 2/24 and the same in 5/24 and down to 175 in 12/24  - begin intermittent fasting and no more than 45 g of carbs for her 2 meals        Patient Instructions   1) Your Hemoglobin A1c (3 month test of blood sugar) has improved from 9.1% to 7.5%.  I will increase your glipizide at dinner to 2 tabs and stay on 1 tab before breakfast.  I sent an updated prescription to [facility].    2) Your cholesterol is higher than last time so I increased your rosuvastatin to 10 mg at bedtime and sent this to [facility].  Take 2 of the 5 mg tabs once daily until these run out and then take 1 of the 10 mg tabs once daily.    3) Your creatinine (kidney test) improved from 1.6 to 1.43.    4) Please come for a follow up visit on 3/28/25 at 2:50pm in our [place] office.    5) I put an order directly into the [facility] system to repeat your labs in the 1-2 weeks prior to your next visit so just ask for the order under my name and make a note on your calendar to have these done at that time.  This order was also put directly into the [facility] portal.  Go under menu and my record and scroll down to upcoming tests and procedures and you are welcome to print this off or bring a copy of the order using the [facility] sarah on your phone to the lab. Thanks!              Return 3/28/25 at 2:50pm.      Olivia Moreno, was evaluated through a synchronous (real-time) audio-video encounter. The patient (or guardian if applicable) is aware that this is a billable service, which includes applicable co-pays. This Virtual Visit was conducted with patient's (and/or legal guardian's) consent. Patient identification was verified, and a caregiver was present when appropriate.   The patient was located at Home: [id number]  [place] [address]  Provider was located at [facility] [address] [facility]

## 2025-01-14 DIAGNOSIS — E11.21 TYPE 2 DIABETES MELLITUS WITH DIABETIC NEPHROPATHY, WITH LONG-TERM CURRENT USE OF INSULIN (HCC): Primary | ICD-10-CM

## 2025-01-14 DIAGNOSIS — Z79.4 TYPE 2 DIABETES MELLITUS WITH DIABETIC NEPHROPATHY, WITH LONG-TERM CURRENT USE OF INSULIN (HCC): Primary | ICD-10-CM

## 2025-01-14 RX ORDER — SITAGLIPTIN AND METFORMIN HYDROCHLORIDE 1000; 50 MG/1; MG/1
TABLET, FILM COATED, EXTENDED RELEASE ORAL
Qty: 180 TABLET | Refills: 3 | Status: SHIPPED | OUTPATIENT
Start: 2025-01-14

## 2025-01-14 RX ORDER — SITAGLIPTIN AND METFORMIN HYDROCHLORIDE 1000; 50 MG/1; MG/1
2 TABLET, FILM COATED, EXTENDED RELEASE ORAL DAILY
Qty: 180 TABLET | Refills: 3 | Status: CANCELLED | OUTPATIENT
Start: 2025-01-14

## 2025-03-14 DIAGNOSIS — E11.21 TYPE 2 DIABETES MELLITUS WITH DIABETIC NEPHROPATHY, WITH LONG-TERM CURRENT USE OF INSULIN (HCC): ICD-10-CM

## 2025-03-14 DIAGNOSIS — E66.3 OVERWEIGHT: ICD-10-CM

## 2025-03-14 DIAGNOSIS — I10 ESSENTIAL (PRIMARY) HYPERTENSION: ICD-10-CM

## 2025-03-14 DIAGNOSIS — Z79.4 TYPE 2 DIABETES MELLITUS WITH DIABETIC NEPHROPATHY, WITH LONG-TERM CURRENT USE OF INSULIN (HCC): ICD-10-CM

## 2025-03-14 DIAGNOSIS — R74.8 ELEVATED ALKALINE PHOSPHATASE LEVEL: ICD-10-CM

## 2025-03-14 DIAGNOSIS — E78.2 MIXED HYPERLIPIDEMIA: ICD-10-CM

## 2025-03-14 DIAGNOSIS — R74.01 ELEVATION OF LEVELS OF LIVER TRANSAMINASE LEVELS: ICD-10-CM

## 2025-03-22 LAB
ALBUMIN SERPL-MCNC: 4.4 G/DL (ref 3.8–4.9)
ALP SERPL-CCNC: 98 IU/L (ref 44–121)
ALT SERPL-CCNC: 14 IU/L (ref 0–32)
AST SERPL-CCNC: 14 IU/L (ref 0–40)
BILIRUB SERPL-MCNC: 0.2 MG/DL (ref 0–1.2)
BUN SERPL-MCNC: 23 MG/DL (ref 6–24)
BUN/CREAT SERPL: 17 (ref 9–23)
CALCIUM SERPL-MCNC: 9.4 MG/DL (ref 8.7–10.2)
CHLORIDE SERPL-SCNC: 102 MMOL/L (ref 96–106)
CHOLEST SERPL-MCNC: 130 MG/DL (ref 100–199)
CO2 SERPL-SCNC: 23 MMOL/L (ref 20–29)
CREAT SERPL-MCNC: 1.36 MG/DL (ref 0.57–1)
EGFRCR SERPLBLD CKD-EPI 2021: 45 ML/MIN/1.73
GLOBULIN SER CALC-MCNC: 2.5 G/DL (ref 1.5–4.5)
GLUCOSE SERPL-MCNC: 150 MG/DL (ref 70–99)
HBA1C MFR BLD: 7 % (ref 4.8–5.6)
HDLC SERPL-MCNC: 38 MG/DL
LDLC SERPL CALC-MCNC: 53 MG/DL (ref 0–99)
POTASSIUM SERPL-SCNC: 4.8 MMOL/L (ref 3.5–5.2)
PROT SERPL-MCNC: 6.9 G/DL (ref 6–8.5)
SODIUM SERPL-SCNC: 140 MMOL/L (ref 134–144)
TRIGL SERPL-MCNC: 244 MG/DL (ref 0–149)
VLDLC SERPL CALC-MCNC: 39 MG/DL (ref 5–40)

## 2025-03-23 LAB
ALBUMIN/CREAT UR: 76 MG/G CREAT (ref 0–29)
CREAT UR-MCNC: 74.8 MG/DL
IMP & REVIEW OF LAB RESULTS: NORMAL
Lab: NORMAL
MICROALBUMIN UR-MCNC: 56.8 UG/ML
REPORT: NORMAL
REPORT: NORMAL

## 2025-03-28 ENCOUNTER — RESULTS FOLLOW-UP (OUTPATIENT)
Age: 60
End: 2025-03-28

## 2025-03-28 ENCOUNTER — OFFICE VISIT (OUTPATIENT)
Age: 60
End: 2025-03-28
Payer: COMMERCIAL

## 2025-03-28 VITALS
SYSTOLIC BLOOD PRESSURE: 107 MMHG | DIASTOLIC BLOOD PRESSURE: 76 MMHG | HEIGHT: 67 IN | BODY MASS INDEX: 28.06 KG/M2 | HEART RATE: 83 BPM | WEIGHT: 178.8 LBS

## 2025-03-28 DIAGNOSIS — R74.01 ELEVATION OF LEVELS OF LIVER TRANSAMINASE LEVELS: ICD-10-CM

## 2025-03-28 DIAGNOSIS — E66.3 OVERWEIGHT: ICD-10-CM

## 2025-03-28 DIAGNOSIS — E11.21 TYPE 2 DIABETES MELLITUS WITH DIABETIC NEPHROPATHY, WITH LONG-TERM CURRENT USE OF INSULIN (HCC): Primary | ICD-10-CM

## 2025-03-28 DIAGNOSIS — Z79.4 TYPE 2 DIABETES MELLITUS WITH DIABETIC NEPHROPATHY, WITH LONG-TERM CURRENT USE OF INSULIN (HCC): Primary | ICD-10-CM

## 2025-03-28 DIAGNOSIS — R74.8 ELEVATED ALKALINE PHOSPHATASE LEVEL: ICD-10-CM

## 2025-03-28 DIAGNOSIS — I10 ESSENTIAL (PRIMARY) HYPERTENSION: ICD-10-CM

## 2025-03-28 DIAGNOSIS — E78.2 MIXED HYPERLIPIDEMIA: ICD-10-CM

## 2025-03-28 PROCEDURE — 3051F HG A1C>EQUAL 7.0%<8.0%: CPT | Performed by: INTERNAL MEDICINE

## 2025-03-28 PROCEDURE — 3078F DIAST BP <80 MM HG: CPT | Performed by: INTERNAL MEDICINE

## 2025-03-28 PROCEDURE — 99214 OFFICE O/P EST MOD 30 MIN: CPT | Performed by: INTERNAL MEDICINE

## 2025-03-28 PROCEDURE — 3074F SYST BP LT 130 MM HG: CPT | Performed by: INTERNAL MEDICINE

## 2025-03-28 NOTE — PROGRESS NOTES
Chief Complaint   Patient presents with    Diabetes     PCP and pharmacy confirmed  Pt consented to use of [facility]     History of Present Illness: Olivia Moreno is a 59 y.o. female here for follow up of diabetes.  Weight up 3 lbs since last visit in 12/24.      History of Present Illness  The patient is a 59-year-old female here for follow-up of diabetes. She had labs on 02/28/2025, which showed a hemoglobin of 10.2, BUN 27, creatinine 1.62, ALT 17, AST 20, total cholesterol 105, triglycerides 195, HDL 44, LDL 22, and vitamin D 27.8.    Her diabetes management includes Janumet 50/1000 mg twice daily, which was increased from once daily due to an adverse reaction to glipizide in 12/2024, characterized by itchy lips. She reports her A1c has improved from 7.5% in 12/2024 to 7.0% currently. She has not been monitoring her blood glucose levels at home recently.     She is also on rosuvastatin 10 mg, which was increased from 5 mg in the evening. She reports no significant increase in muscle aches or cramps, although she occasionally experiences leg cramps at night. Her cholesterol levels have improved with the current regimen.    She has been taking vitamin D supplements but acknowledges the possibility of missed doses in 02/2025. She uses a pill box to organize her medications. Her vitamin D level was 27.8 in 02/2025.    She occasionally uses Motrin, Aleve, Advil, or ibuprofen but primarily relies on Tylenol. She reports no lower extremity edema.    She is under the care of [facility] and has a CT scan scheduled for 03/31/2025. She had a norovirus and a severe cold, which has persisted for four weeks, and a recent chest x-ray showed no abnormalities.    She will be putting her house on the market in the next 3 weeks and then moving to [place], [place] but her daughter will still live in the area and she may try to keep me and her PCP as part of her care team if her new insurance allows her

## 2025-03-28 NOTE — PATIENT INSTRUCTIONS
1) Your Hemoglobin A1c (3 month test of blood sugar) is now at goal of 7%.  Keep up the good work!    2) Your creatinine (kidney test) improved from 1.62 in 2/25 to 1.36.    3) ALT and AST are markers of liver function.  Your values are normal.    4) Your cholesterol is much better on the 10 mg of rosuvastastin.    5) Your blood pressure is controlled.      6) Let me know when you'll be back in town in the next 6-12 months and I can squeeze you in and can arrange a repeat lab draw prior to the visit.

## 2025-08-22 RX ORDER — ROSUVASTATIN CALCIUM 10 MG/1
10 TABLET, COATED ORAL NIGHTLY
Qty: 90 TABLET | Refills: 3 | Status: SHIPPED | OUTPATIENT
Start: 2025-08-22